# Patient Record
Sex: FEMALE | Race: WHITE | Employment: OTHER | URBAN - METROPOLITAN AREA
[De-identification: names, ages, dates, MRNs, and addresses within clinical notes are randomized per-mention and may not be internally consistent; named-entity substitution may affect disease eponyms.]

---

## 2017-09-27 ENCOUNTER — HOSPITAL ENCOUNTER (OUTPATIENT)
Dept: WOMENS IMAGING | Age: 77
Discharge: HOME | End: 2017-09-27
Attending: FAMILY MEDICINE | Admitting: FAMILY MEDICINE

## 2017-09-27 DIAGNOSIS — Z12.31 VISIT FOR SCREENING MAMMOGRAM: ICD-10-CM

## 2019-03-26 NOTE — PROGRESS NOTES
Phone message left to call PAT dept at [phone]  for history review and surgery instructions. Electronically signed by Sukhjinder Newberry RN on 3/26/19 at 1:51 PM

## 2019-03-27 RX ORDER — ERYTHROMYCIN 5 MG/G
OINTMENT OPHTHALMIC PRN
COMMUNITY
Start: 2017-09-21

## 2019-03-27 RX ORDER — GLYCOPYRROLATE 2 MG/1
2 TABLET ORAL 2 TIMES DAILY
COMMUNITY

## 2019-03-27 RX ORDER — ROPINIROLE 0.5 MG/1
TABLET, FILM COATED ORAL
COMMUNITY
Start: 2017-01-10

## 2019-03-27 RX ORDER — TRAMADOL HYDROCHLORIDE 50 MG/1
50 TABLET ORAL EVERY 6 HOURS PRN
COMMUNITY

## 2019-03-27 RX ORDER — VIT C/B6/B5/MAGNESIUM/HERB 173 50-5-6-5MG
1 CAPSULE ORAL DAILY
COMMUNITY

## 2019-03-27 RX ORDER — HYDROCODONE BITARTRATE AND ACETAMINOPHEN 5; 325 MG/1; MG/1
1 TABLET ORAL NIGHTLY PRN
COMMUNITY

## 2019-03-27 RX ORDER — LISINOPRIL 5 MG/1
5 TABLET ORAL DAILY
COMMUNITY

## 2019-03-27 RX ORDER — ATENOLOL 50 MG/1
TABLET ORAL
COMMUNITY
Start: 2017-08-10

## 2019-03-27 RX ORDER — AMLODIPINE BESYLATE 10 MG/1
TABLET ORAL
COMMUNITY
Start: 2009-09-09

## 2019-03-27 RX ORDER — LOVASTATIN 40 MG/1
TABLET ORAL
COMMUNITY
Start: 2017-01-10

## 2019-03-27 RX ORDER — RANITIDINE 300 MG/1
TABLET ORAL
COMMUNITY
Start: 2016-11-03

## 2019-03-27 RX ORDER — WARFARIN SODIUM 5 MG/1
TABLET ORAL
COMMUNITY
Start: 2017-07-25

## 2019-03-27 RX ORDER — POTASSIUM CHLORIDE 1500 MG/1
20 TABLET, FILM COATED, EXTENDED RELEASE ORAL
COMMUNITY

## 2019-03-27 RX ORDER — PANTOPRAZOLE SODIUM 40 MG/1
TABLET, DELAYED RELEASE ORAL
COMMUNITY
Start: 2017-08-15

## 2019-03-27 RX ORDER — NITROGLYCERIN 0.4 MG/1
0.4 TABLET SUBLINGUAL EVERY 5 MIN PRN
COMMUNITY
Start: 2008-11-12

## 2019-03-27 RX ORDER — VITAMIN E 268 MG
400 CAPSULE ORAL DAILY
COMMUNITY

## 2019-03-27 SDOH — HEALTH STABILITY: MENTAL HEALTH: HOW OFTEN DO YOU HAVE A DRINK CONTAINING ALCOHOL?: NEVER

## 2019-03-27 NOTE — PROGRESS NOTES
[address] [facility] time___0600_________        Surgery time___0730_________    Take the following medications with a sip of water: AMLODIPINE,ATENOLOL,LISINOPRIL,RANITIDINE,TRAMADOL IF NEEDED  Do not eat or drink anything after 12:00 midnight prior to your surgery. This includes water chewing gum, mints and ice chips. You may brush your teeth and gargle the morning of your surgery, but do not swallow the water     Please see your family doctor/pediatrician for a history and physical and/or concerning medications. Bring any test results/reports from your physicians office. If you are under the care of a heart doctor or specialist doctor, please be aware that you may be asked to them for clearance    You may be asked to stop blood thinners such as Coumadin, Plavix, Fragmin, Lovenox, etc., or any anti-inflammatories such as:  Aspirin, Ibuprofen, Advil, Naproxen prior to your surgery. We also ask that you stop any OTC medications such as fish oil, vitamin E, glucosamine, garlic, Multivitamins, COQ 10, etc.STOP TUMERIC,MILK THISTLE-FOLLOW INSTRUCTIONS ON COUMADIN AND ASPIRIN FROM DR. POWERS    We ask that you do not smoke 24 hours prior to surgery  We ask that you do not  drink any alcoholic beverages 24 hours prior to surgery     You must make arrangements for a responsible adult to take you home after your surgery. For your safety you will not be allowed to leave alone or drive yourself home. Your surgery will be cancelled if you do not have a ride home. Also for your safety, it is strongly suggested that someone stay with you the first 24 hours after your surgery. A parent or legal guardian must accompany a child scheduled for surgery and plan to stay at the hospital until the child is discharged. Please do not bring other children with you. For your comfort, please wear simple loose fitting clothing to the hospital.  Please do not bring valuables. Do not wear any make-up or nail polish on your fingers or toes      For your safety, please do not wear any jewelry or body piercing's on the day of surgery. All jewelry must be removed. If you have dentures, they will be removed before going to operating room. For your convenience, we will provide you with a container. If you wear contact lenses or glasses, they will be removed, please bring a case for them. If you have a living will and a durable power of  for healthcare, please bring in a copy. As part of our patient safety program to minimize surgical site infections, we ask you to do the following:    · Please notify your surgeon if you develop any illness between         now and the  day of your surgery. · This includes a cough, cold, fever, sore throat, nausea,         or vomiting, and diarrhea, etc.  ·  Please notify your surgeon if you experience dizziness, shortness         of breath or blurred vision between now and the time of your surgery. Do not shave your operative site 96 hours prior to surgery. For face and neck surgery, men may use an electric razor 48 hours   prior to surgery. You may shower the night before surgery or the morning of   your surgery with an antibacterial soap. You will need to bring a photo ID and insurance card    [facility] has an onsite pharmacy, would you like to utilize our pharmacy     If you will be staying overnight and use a C-pap machine, please bring   your C-pap to hospital     Our goal is to provide you with excellent care, therefore, visitors will be limited to two(2) in the room at a time so that we may focus on providing this care for you. Please contact pre-admission testing if you have any further questions.                  [facility] phone number:  [phone] PAT fax number:  [phone]  Please note these are generalized instructions for all surgical cases, you may be provided with more specific instructions according to your surgery.

## 2019-03-27 NOTE — PROGRESS NOTES
C-Difficile admission screening and protocol:     * Admitted with diarrhea? NO     *Prior history of C-Diff. In last 3 months? NO     *Antibiotic use in the past 6-8 weeks? NO     *Prior hospitalization or nursing home in the last month?    NO

## 2019-03-28 NOTE — PROGRESS NOTES
DR. Easton Flores OFFICE NOTIFIED FOR PATIENT REGARDING HER INSTRUCTIONS FOR HER COUMADIN AND ASPIRIN PRIOR TO SURGERY

## 2019-03-29 ENCOUNTER — ANESTHESIA EVENT (OUTPATIENT)
Dept: OPERATING ROOM | Age: 79
End: 2019-03-29
Payer: MEDICARE

## 2019-04-02 ENCOUNTER — HOSPITAL ENCOUNTER (OUTPATIENT)
Age: 79
Setting detail: OUTPATIENT SURGERY
Discharge: HOME | End: 2019-04-02
Attending: OBSTETRICS & GYNECOLOGY | Admitting: OBSTETRICS & GYNECOLOGY
Payer: MEDICARE

## 2019-04-02 ENCOUNTER — APPOINTMENT (OUTPATIENT)
Dept: GENERAL RADIOLOGY | Age: 79
End: 2019-04-02
Attending: OBSTETRICS & GYNECOLOGY
Payer: MEDICARE

## 2019-04-02 ENCOUNTER — ANESTHESIA (OUTPATIENT)
Dept: OPERATING ROOM | Age: 79
End: 2019-04-02
Payer: MEDICARE

## 2019-04-02 VITALS
RESPIRATION RATE: 14 BRPM | OXYGEN SATURATION: 96 % | WEIGHT: 189.6 LBS | BODY MASS INDEX: 29.76 KG/M2 | SYSTOLIC BLOOD PRESSURE: 151 MMHG | DIASTOLIC BLOOD PRESSURE: 82 MMHG | HEIGHT: 67 IN | TEMPERATURE: 97 F | HEART RATE: 85 BPM

## 2019-04-02 VITALS
RESPIRATION RATE: 4 BRPM | SYSTOLIC BLOOD PRESSURE: 117 MMHG | DIASTOLIC BLOOD PRESSURE: 62 MMHG | OXYGEN SATURATION: 99 %

## 2019-04-02 DIAGNOSIS — N95.0 POSTMENOPAUSAL BLEEDING: ICD-10-CM

## 2019-04-02 LAB
ABO/RH: NORMAL
ANION GAP SERPL CALCULATED.3IONS-SCNC: 15 MMOL/L (ref 3–16)
ANTIBODY SCREEN: NORMAL
BUN BLDV-MCNC: 10 MG/DL (ref 7–20)
CALCIUM SERPL-MCNC: 9.3 MG/DL (ref 8.3–10.6)
CHLORIDE BLD-SCNC: 102 MMOL/L (ref 99–110)
CO2: 23 MMOL/L (ref 21–32)
CREAT SERPL-MCNC: <0.5 MG/DL (ref 0.6–1.2)
EKG ATRIAL RATE: 67 BPM
EKG DIAGNOSIS: NORMAL
EKG P AXIS: 31 DEGREES
EKG P-R INTERVAL: 160 MS
EKG Q-T INTERVAL: 420 MS
EKG QRS DURATION: 86 MS
EKG QTC CALCULATION (BAZETT): 443 MS
EKG R AXIS: -12 DEGREES
EKG T AXIS: 43 DEGREES
EKG VENTRICULAR RATE: 67 BPM
GFR AFRICAN AMERICAN: >60
GFR NON-AFRICAN AMERICAN: >60
GLUCOSE BLD-MCNC: 97 MG/DL (ref 70–99)
HCT VFR BLD CALC: 42 % (ref 36–48)
HCT VFR BLD CALC: 42.9 % (ref 36–48)
HEMOGLOBIN: 14 G/DL (ref 12–16)
HEMOGLOBIN: 14.4 G/DL (ref 12–16)
INR BLD: 3.74 (ref 0.86–1.14)
MCH RBC QN AUTO: 30.6 PG (ref 26–34)
MCH RBC QN AUTO: 30.7 PG (ref 26–34)
MCHC RBC AUTO-ENTMCNC: 33.3 G/DL (ref 31–36)
MCHC RBC AUTO-ENTMCNC: 33.5 G/DL (ref 31–36)
MCV RBC AUTO: 91.8 FL (ref 80–100)
MCV RBC AUTO: 91.8 FL (ref 80–100)
PDW BLD-RTO: 13.9 % (ref 12.4–15.4)
PDW BLD-RTO: 14.4 % (ref 12.4–15.4)
PLATELET # BLD: 186 K/UL (ref 135–450)
PLATELET # BLD: 201 K/UL (ref 135–450)
PMV BLD AUTO: 8.2 FL (ref 5–10.5)
PMV BLD AUTO: 8.3 FL (ref 5–10.5)
POTASSIUM REFLEX MAGNESIUM: 3.8 MMOL/L (ref 3.5–5.1)
PROTHROMBIN TIME: 42.6 SEC (ref 9.8–13)
RBC # BLD: 4.58 M/UL (ref 4–5.2)
RBC # BLD: 4.68 M/UL (ref 4–5.2)
SODIUM BLD-SCNC: 140 MMOL/L (ref 136–145)
WBC # BLD: 11.8 K/UL (ref 4–11)
WBC # BLD: 4.6 K/UL (ref 4–11)

## 2019-04-02 PROCEDURE — 7100000011 HC PHASE II RECOVERY - ADDTL 15 MIN: Performed by: OBSTETRICS & GYNECOLOGY

## 2019-04-02 PROCEDURE — 3700000000 HC ANESTHESIA ATTENDED CARE: Performed by: OBSTETRICS & GYNECOLOGY

## 2019-04-02 PROCEDURE — 3600000004 HC SURGERY LEVEL 4 BASE: Performed by: OBSTETRICS & GYNECOLOGY

## 2019-04-02 PROCEDURE — 86900 BLOOD TYPING SEROLOGIC ABO: CPT

## 2019-04-02 PROCEDURE — 6360000002 HC RX W HCPCS: Performed by: NURSE ANESTHETIST, CERTIFIED REGISTERED

## 2019-04-02 PROCEDURE — 36415 COLL VENOUS BLD VENIPUNCTURE: CPT

## 2019-04-02 PROCEDURE — 2709999900 HC NON-CHARGEABLE SUPPLY: Performed by: OBSTETRICS & GYNECOLOGY

## 2019-04-02 PROCEDURE — 85027 COMPLETE CBC AUTOMATED: CPT

## 2019-04-02 PROCEDURE — 3700000001 HC ADD 15 MINUTES (ANESTHESIA): Performed by: OBSTETRICS & GYNECOLOGY

## 2019-04-02 PROCEDURE — 6360000002 HC RX W HCPCS: Performed by: ANESTHESIOLOGY

## 2019-04-02 PROCEDURE — 7100000010 HC PHASE II RECOVERY - FIRST 15 MIN: Performed by: OBSTETRICS & GYNECOLOGY

## 2019-04-02 PROCEDURE — 80048 BASIC METABOLIC PNL TOTAL CA: CPT

## 2019-04-02 PROCEDURE — 85610 PROTHROMBIN TIME: CPT

## 2019-04-02 PROCEDURE — 86850 RBC ANTIBODY SCREEN: CPT

## 2019-04-02 PROCEDURE — 2580000003 HC RX 258: Performed by: ANESTHESIOLOGY

## 2019-04-02 PROCEDURE — 85025 COMPLETE CBC W/AUTO DIFF WBC: CPT

## 2019-04-02 PROCEDURE — 2720000010 HC SURG SUPPLY STERILE: Performed by: OBSTETRICS & GYNECOLOGY

## 2019-04-02 PROCEDURE — 6370000000 HC RX 637 (ALT 250 FOR IP): Performed by: ANESTHESIOLOGY

## 2019-04-02 PROCEDURE — 7100000001 HC PACU RECOVERY - ADDTL 15 MIN: Performed by: OBSTETRICS & GYNECOLOGY

## 2019-04-02 PROCEDURE — 71045 X-RAY EXAM CHEST 1 VIEW: CPT

## 2019-04-02 PROCEDURE — 2580000003 HC RX 258: Performed by: OBSTETRICS & GYNECOLOGY

## 2019-04-02 PROCEDURE — 86901 BLOOD TYPING SEROLOGIC RH(D): CPT

## 2019-04-02 PROCEDURE — 7100000000 HC PACU RECOVERY - FIRST 15 MIN: Performed by: OBSTETRICS & GYNECOLOGY

## 2019-04-02 PROCEDURE — 3600000014 HC SURGERY LEVEL 4 ADDTL 15MIN: Performed by: OBSTETRICS & GYNECOLOGY

## 2019-04-02 PROCEDURE — 2500000003 HC RX 250 WO HCPCS: Performed by: NURSE ANESTHETIST, CERTIFIED REGISTERED

## 2019-04-02 PROCEDURE — 93005 ELECTROCARDIOGRAM TRACING: CPT | Performed by: ANESTHESIOLOGY

## 2019-04-02 PROCEDURE — 88305 TISSUE EXAM BY PATHOLOGIST: CPT

## 2019-04-02 PROCEDURE — 93010 ELECTROCARDIOGRAM REPORT: CPT | Performed by: INTERNAL MEDICINE

## 2019-04-02 RX ORDER — ONDANSETRON 2 MG/ML
4 INJECTION INTRAMUSCULAR; INTRAVENOUS
Status: DISCONTINUED | OUTPATIENT
Start: 2019-04-02 | End: 2019-04-02 | Stop reason: HOSPADM

## 2019-04-02 RX ORDER — MAGNESIUM HYDROXIDE 1200 MG/15ML
LIQUID ORAL CONTINUOUS PRN
Status: COMPLETED | OUTPATIENT
Start: 2019-04-02 | End: 2019-04-02

## 2019-04-02 RX ORDER — SODIUM CHLORIDE 0.9 % (FLUSH) 0.9 %
10 SYRINGE (ML) INJECTION PRN
Status: DISCONTINUED | OUTPATIENT
Start: 2019-04-02 | End: 2019-04-02 | Stop reason: HOSPADM

## 2019-04-02 RX ORDER — SODIUM CHLORIDE 0.9 % (FLUSH) 0.9 %
10 SYRINGE (ML) INJECTION EVERY 12 HOURS SCHEDULED
Status: DISCONTINUED | OUTPATIENT
Start: 2019-04-02 | End: 2019-04-02 | Stop reason: HOSPADM

## 2019-04-02 RX ORDER — ONDANSETRON 2 MG/ML
INJECTION INTRAMUSCULAR; INTRAVENOUS PRN
Status: DISCONTINUED | OUTPATIENT
Start: 2019-04-02 | End: 2019-04-02 | Stop reason: SDUPTHER

## 2019-04-02 RX ORDER — GLYCOPYRROLATE 0.2 MG/ML
INJECTION INTRAMUSCULAR; INTRAVENOUS PRN
Status: DISCONTINUED | OUTPATIENT
Start: 2019-04-02 | End: 2019-04-02 | Stop reason: SDUPTHER

## 2019-04-02 RX ORDER — PROPOFOL 10 MG/ML
INJECTION, EMULSION INTRAVENOUS PRN
Status: DISCONTINUED | OUTPATIENT
Start: 2019-04-02 | End: 2019-04-02 | Stop reason: SDUPTHER

## 2019-04-02 RX ORDER — FENTANYL CITRATE 50 UG/ML
25 INJECTION, SOLUTION INTRAMUSCULAR; INTRAVENOUS EVERY 5 MIN PRN
Status: DISCONTINUED | OUTPATIENT
Start: 2019-04-02 | End: 2019-04-02 | Stop reason: HOSPADM

## 2019-04-02 RX ORDER — SUCCINYLCHOLINE CHLORIDE 20 MG/ML
INJECTION INTRAMUSCULAR; INTRAVENOUS PRN
Status: DISCONTINUED | OUTPATIENT
Start: 2019-04-02 | End: 2019-04-02 | Stop reason: SDUPTHER

## 2019-04-02 RX ORDER — SODIUM CHLORIDE 9 MG/ML
INJECTION, SOLUTION INTRAVENOUS CONTINUOUS
Status: DISCONTINUED | OUTPATIENT
Start: 2019-04-02 | End: 2019-04-02 | Stop reason: HOSPADM

## 2019-04-02 RX ORDER — DEXAMETHASONE SODIUM PHOSPHATE 4 MG/ML
INJECTION, SOLUTION INTRA-ARTICULAR; INTRALESIONAL; INTRAMUSCULAR; INTRAVENOUS; SOFT TISSUE PRN
Status: DISCONTINUED | OUTPATIENT
Start: 2019-04-02 | End: 2019-04-02 | Stop reason: SDUPTHER

## 2019-04-02 RX ORDER — FENTANYL CITRATE 50 UG/ML
INJECTION, SOLUTION INTRAMUSCULAR; INTRAVENOUS PRN
Status: DISCONTINUED | OUTPATIENT
Start: 2019-04-02 | End: 2019-04-02 | Stop reason: SDUPTHER

## 2019-04-02 RX ORDER — ACETAMINOPHEN 325 MG/1
650 TABLET ORAL EVERY 4 HOURS PRN
Status: DISCONTINUED | OUTPATIENT
Start: 2019-04-02 | End: 2019-04-02 | Stop reason: HOSPADM

## 2019-04-02 RX ORDER — LIDOCAINE HYDROCHLORIDE 20 MG/ML
INJECTION, SOLUTION EPIDURAL; INFILTRATION; INTRACAUDAL; PERINEURAL PRN
Status: DISCONTINUED | OUTPATIENT
Start: 2019-04-02 | End: 2019-04-02 | Stop reason: SDUPTHER

## 2019-04-02 RX ORDER — FENTANYL CITRATE 50 UG/ML
50 INJECTION, SOLUTION INTRAMUSCULAR; INTRAVENOUS EVERY 5 MIN PRN
Status: DISCONTINUED | OUTPATIENT
Start: 2019-04-02 | End: 2019-04-02 | Stop reason: HOSPADM

## 2019-04-02 RX ADMIN — FENTANYL CITRATE 25 MCG: 50 INJECTION, SOLUTION INTRAMUSCULAR; INTRAVENOUS at 08:43

## 2019-04-02 RX ADMIN — SODIUM CHLORIDE: 9 INJECTION, SOLUTION INTRAVENOUS at 07:19

## 2019-04-02 RX ADMIN — FENTANYL CITRATE 100 MCG: 50 INJECTION INTRAMUSCULAR; INTRAVENOUS at 07:31

## 2019-04-02 RX ADMIN — DEXAMETHASONE SODIUM PHOSPHATE 8 MG: 4 INJECTION, SOLUTION INTRAMUSCULAR; INTRAVENOUS at 07:54

## 2019-04-02 RX ADMIN — ONDANSETRON 4 MG: 2 INJECTION INTRAMUSCULAR; INTRAVENOUS at 08:03

## 2019-04-02 RX ADMIN — ACETAMINOPHEN 650 MG: 325 TABLET, FILM COATED ORAL at 11:56

## 2019-04-02 RX ADMIN — PROPOFOL 150 MG: 10 INJECTION, EMULSION INTRAVENOUS at 07:36

## 2019-04-02 RX ADMIN — GLYCOPYRROLATE 0.2 MG: 0.2 INJECTION, SOLUTION INTRAMUSCULAR; INTRAVENOUS at 07:55

## 2019-04-02 RX ADMIN — LIDOCAINE HYDROCHLORIDE 40 MG: 20 INJECTION, SOLUTION EPIDURAL; INFILTRATION; INTRACAUDAL; PERINEURAL at 07:36

## 2019-04-02 RX ADMIN — PROPOFOL 50 MG: 10 INJECTION, EMULSION INTRAVENOUS at 07:53

## 2019-04-02 RX ADMIN — SUCCINYLCHOLINE CHLORIDE 100 MG: 20 INJECTION, SOLUTION INTRAMUSCULAR; INTRAVENOUS at 07:53

## 2019-04-02 RX ADMIN — SODIUM CHLORIDE: 9 INJECTION, SOLUTION INTRAVENOUS at 07:31

## 2019-04-02 RX ADMIN — FENTANYL CITRATE 50 MCG: 50 INJECTION, SOLUTION INTRAMUSCULAR; INTRAVENOUS at 08:34

## 2019-04-02 ASSESSMENT — PAIN DESCRIPTION - PAIN TYPE
TYPE: OTHER (COMMENT)
TYPE: ACUTE PAIN
TYPE: SURGICAL PAIN
TYPE: CHRONIC PAIN
TYPE: CHRONIC PAIN
TYPE: SURGICAL PAIN
TYPE_2: ACUTE PAIN
TYPE: SURGICAL PAIN
TYPE: CHRONIC PAIN
TYPE: SURGICAL PAIN

## 2019-04-02 ASSESSMENT — PAIN SCALES - GENERAL
PAINLEVEL_OUTOF10: 0
PAINLEVEL_OUTOF10: 2
PAINLEVEL_OUTOF10: 4
PAINLEVEL_OUTOF10: 0
PAINLEVEL_OUTOF10: 8
PAINLEVEL_OUTOF10: 0
PAINLEVEL_OUTOF10: 2
PAINLEVEL_OUTOF10: 2
PAINLEVEL_OUTOF10: 0
PAINLEVEL_OUTOF10: 2
PAINLEVEL_OUTOF10: 4

## 2019-04-02 ASSESSMENT — PAIN DESCRIPTION - LOCATION
LOCATION: LEG
LOCATION: LEG
LOCATION: HEAD
LOCATION_2: HEAD
LOCATION: ABDOMEN
LOCATION: LEG
LOCATION: HEAD

## 2019-04-02 ASSESSMENT — PULMONARY FUNCTION TESTS
PIF_VALUE: 1
PIF_VALUE: 8
PIF_VALUE: 0
PIF_VALUE: 5
PIF_VALUE: 1
PIF_VALUE: 3
PIF_VALUE: 1
PIF_VALUE: 35
PIF_VALUE: 3
PIF_VALUE: 19
PIF_VALUE: 3
PIF_VALUE: 4
PIF_VALUE: 5
PIF_VALUE: 3
PIF_VALUE: 1
PIF_VALUE: 5
PIF_VALUE: 33
PIF_VALUE: 6
PIF_VALUE: 34
PIF_VALUE: 6
PIF_VALUE: 7
PIF_VALUE: 5
PIF_VALUE: 23
PIF_VALUE: 2
PIF_VALUE: 0
PIF_VALUE: 9
PIF_VALUE: 31
PIF_VALUE: 5
PIF_VALUE: 33
PIF_VALUE: 4
PIF_VALUE: 1
PIF_VALUE: 9
PIF_VALUE: 9
PIF_VALUE: 34
PIF_VALUE: 1
PIF_VALUE: 9
PIF_VALUE: 35
PIF_VALUE: 33
PIF_VALUE: 0
PIF_VALUE: 8
PIF_VALUE: 1
PIF_VALUE: 34
PIF_VALUE: 0
PIF_VALUE: 0
PIF_VALUE: 3
PIF_VALUE: 3
PIF_VALUE: 4
PIF_VALUE: 19

## 2019-04-02 ASSESSMENT — PAIN - FUNCTIONAL ASSESSMENT
PAIN_FUNCTIONAL_ASSESSMENT: ACTIVITIES ARE NOT PREVENTED
PAIN_FUNCTIONAL_ASSESSMENT: 0-10
PAIN_FUNCTIONAL_ASSESSMENT: ACTIVITIES ARE NOT PREVENTED

## 2019-04-02 ASSESSMENT — PAIN DESCRIPTION - PROGRESSION
CLINICAL_PROGRESSION: NOT CHANGED
CLINICAL_PROGRESSION: GRADUALLY WORSENING
CLINICAL_PROGRESSION: NOT CHANGED
CLINICAL_PROGRESSION: GRADUALLY WORSENING
CLINICAL_PROGRESSION: NOT CHANGED
CLINICAL_PROGRESSION: NOT CHANGED
CLINICAL_PROGRESSION_2: RESOLVED
CLINICAL_PROGRESSION: NOT CHANGED
CLINICAL_PROGRESSION: NOT CHANGED
CLINICAL_PROGRESSION: GRADUALLY IMPROVING

## 2019-04-02 ASSESSMENT — PAIN DESCRIPTION - ONSET
ONSET: ON-GOING
ONSET: GRADUAL
ONSET: ON-GOING
ONSET: GRADUAL
ONSET: ON-GOING
ONSET: GRADUAL
ONSET: GRADUAL
ONSET: ON-GOING

## 2019-04-02 ASSESSMENT — PAIN DESCRIPTION - ORIENTATION
ORIENTATION: LOWER
ORIENTATION: RIGHT;LEFT
ORIENTATION: MID
ORIENTATION: LOWER
ORIENTATION: LOWER;MID
ORIENTATION: LOWER
ORIENTATION: RIGHT;LEFT
ORIENTATION: MID
ORIENTATION: RIGHT;LEFT
ORIENTATION: LOWER

## 2019-04-02 ASSESSMENT — PAIN DESCRIPTION - DESCRIPTORS
DESCRIPTORS: ACHING
DESCRIPTORS: CRAMPING
DESCRIPTORS: ACHING
DESCRIPTORS_2: OTHER (COMMENT)
DESCRIPTORS: ACHING
DESCRIPTORS: ACHING

## 2019-04-02 ASSESSMENT — PAIN DESCRIPTION - FREQUENCY
FREQUENCY: CONTINUOUS
FREQUENCY: INTERMITTENT
FREQUENCY: CONTINUOUS
FREQUENCY: CONTINUOUS
FREQUENCY: INTERMITTENT
FREQUENCY: CONTINUOUS
FREQUENCY: INTERMITTENT

## 2019-04-02 ASSESSMENT — LIFESTYLE VARIABLES: SMOKING_STATUS: 0

## 2019-04-02 ASSESSMENT — PAIN DESCRIPTION - INTENSITY: RATING_2: 0

## 2019-04-02 NOTE — PROGRESS NOTES
Awaiting CBC results, lab contacted. C/o abd cramping pain after eating, monitoring as patient received med for HA which has resolved.

## 2019-04-02 NOTE — PROGRESS NOTES
From PACU. Alert and oriented. C/o 2/10 surgical abd aching that is tolerable. abd soft. No drainage on yudy pad at this time.

## 2019-04-02 NOTE — PROGRESS NOTES
Pt states pain is now 4/10. Pt medicated per order. (See eMAR).  Electronically signed by Brett Schmidt RN on 4/2/2019 at 8:44 AM

## 2019-04-02 NOTE — PROGRESS NOTES
Spoke with Dr Fatou Norwood, stated that pt needed a CBC before leaving and to call with results before pt was allowed to be discharged. Stated that ok for pt to go to phase II before labs resulted, but that she is to be called with CBC results before pt is discharged.  Electronically signed by Elaina Bee RN on 4/2/2019 at 9:11 AM

## 2019-04-02 NOTE — PROGRESS NOTES
Nafisa Jose NP from Dr Carloz Granados office called and said that Dr Oswald Martinez would prefer if Dr Nicholas Hamilton would keep the patient over night for observation and the patient should not take her warfarin until she is evaluated by the coumadin clinic on 4/4/19.

## 2019-04-02 NOTE — ANESTHESIA POSTPROCEDURE EVALUATION
Department of Anesthesiology  Postprocedure Note    Patient: Jaswant Rosas  MRN: [id number]  YOB: 1940  Date of evaluation: 4/2/2019  Time:  10:24 AM     Procedure Summary     Date:  04/02/19 Room / Location:  [facility] OR  / [facility] OR    Anesthesia Start:  0732 Anesthesia Stop:  0603    Procedure:  HYSTEROSCOPY DILATION AND CURETTAGE POLYP REMOVAL (N/A ) Diagnosis:       Postmenopausal bleeding      (POST MENOPAUSAL BLEEDING)    Surgeon:  Gio Mancilla MD Responsible Provider:  Anai Frank MD    Anesthesia Type:  general ASA Status:  3          Anesthesia Type: general    Saad Phase I: Saad Score: 10    Saad Phase II:      Last vitals: Reviewed and per EMR flowsheets. Anesthesia Post Evaluation    Patient location during evaluation: PACU  Patient participation: complete - patient participated  Level of consciousness: awake and alert  Pain score: 2  Airway patency: patent  Nausea & Vomiting: no nausea and no vomiting  Complications: no  Cardiovascular status: blood pressure returned to baseline  Respiratory status: acceptable (Patient thought to have possibly aspirated on induction.   Postoperatively, patient expreienced no resp distress, Sp)2 in 90s on RA, CXR clear)  Hydration status: euvolemic

## 2019-04-02 NOTE — PROGRESS NOTES
Alert and oriented. 2/10 tolerable abd surgical pain that is unchanging. abd soft. No drainage on yudy pad. Dr Gama Galvan notified of H/H of 10.3/31.3 and patient's condition. 1300 repeat cbc ordered and also ordered to contact cardiologist about warfarin order.

## 2019-04-02 NOTE — PROGRESS NOTES
Pt states pain is 8/10. Pt medicated per order (see eMAR).  Electronically signed by Rhonda Cristina RN on 4/2/2019 at 8:43 AM

## 2019-04-02 NOTE — PROGRESS NOTES
Alert and oriented. Vss. abd soft. Scant red drainage on yudy pad. Tolerated sitting up and po fluids and cookies well. Family at bedside. abd discomfort remains the same at 2/10. Jensen Gómez NP (Dr. Phan/cardiologist office) said she would report to Dr. Bebeto Sheldon patient's condition and would call back about warfarin order. Charge nurse, Jamee Dance RN notified of patient's condition and activities associated with them.

## 2019-04-02 NOTE — PROGRESS NOTES
Pt now switched to 4L O2 via nasal cannula. O2 sat at 98%. Will monitor.  Electronically signed by Hali Higuera RN on 4/2/2019 at 8:46 AM

## 2019-04-02 NOTE — PROGRESS NOTES
Pt now on RA, 95%-96%. Will monitor.  Electronically signed by Mery Mckeon RN on 4/2/2019 at 8:56 AM

## 2019-04-02 NOTE — ANESTHESIA PRE PROCEDURE
Department of Anesthesiology  Preprocedure Note       Name:  Ida Purcell   Age:  69 y.o.  :  1940                                          MRN:  [id number]         Date:  2019      Surgeon: Bandar Renee):  Rm Mcfadden MD    Procedure: HYSTEROSCOPY DILATION AND CURETTAGE POLYP REMOVAL (N/A )    Medications prior to admission:   Prior to Admission medications    Medication Sig Start Date End Date Taking?  Authorizing Provider   Chlorpheniramine-DM (CORICIDIN HBP COUGH/COLD PO) Take 1 tablet by mouth as needed   Yes Historical Provider, MD   VITAMIN B COMPLEX-C PO Take 1 tablet by mouth daily    Yes Historical Provider, MD   Glucosamine-Chondroitin 250-200 MG TABS Take 1 capsule by mouth daily    Yes Historical Provider, MD   amLODIPine (NORVASC) 10 MG tablet TAKE 1 TABLET EVERY DAY (NEED MD APPOINTMENT FOR FURTHER REFILLS) 09  Yes Historical Provider, MD   atenolol (TENORMIN) 50 MG tablet TAKE 1 TABLET EVERY DAY (NEED MD APPOINTMENT FOR FURTHER REFILLS) 8/10/17  Yes Historical Provider, MD   erythromycin (ROMYCIN) 5 MG/GM ophthalmic ointment Place into both eyes as needed  17  Yes Historical Provider, MD   lovastatin (MEVACOR) 40 MG tablet TAKE 1 TABLET EVERY DAY WITH DINNER 1/10/17  Yes Historical Provider, MD   nitroGLYCERIN (NITROSTAT) 0.4 MG SL tablet Place 0.4 mg under the tongue every 5 minutes as needed  08  Yes Historical Provider, MD   pantoprazole (PROTONIX) 40 MG tablet TAKE 1 TABLET BY MOUTH DAILY IN EVENING 8/15/17  Yes Historical Provider, MD   ranitidine (ZANTAC) 300 MG tablet TAKE 1 TABLET TWICE DAILY 11/3/16  Yes Historical Provider, MD   rOPINIRole (REQUIP) 0.5 MG tablet TAKE 1 TABLET 6 TIMES DAILY 1/10/17  Yes Historical Provider, MD   Turmeric 500 MG CAPS Take 1 tablet by mouth daily    Yes Historical Provider, MD   warfarin (COUMADIN) 5 MG tablet TAKE 1 TABLET DAILY AS INSTRUCTED PENDING INR RESULTS 17  Yes Historical Provider, MD   glycopyrrolate Laterality Date    CHOLECYSTECTOMY      COLONOSCOPY      CORONARY ANGIOPLASTY WITH STENT PLACEMENT  2014    X4    LIPOMA RESECTION      LIPOMA REMOVAL SHOULDER       Social History:    Social History     Tobacco Use    Smoking status: Never Smoker    Smokeless tobacco: Never Used   Substance Use Topics    Alcohol use: Yes     Frequency: Never     Comment: OCCAS                                Counseling given: Not Answered      Vital Signs (Current):   Vitals:    03/27/19 0927 04/02/19 0620   BP:  (!) 153/78   Pulse:  69   Resp:  16   Temp:  97 °F (36.1 °C)   TempSrc:  Temporal   SpO2:  94%   Weight: 183 lb (83 kg) 189 lb 9.5 oz (86 kg)   Height: 5' 7\" (1.702 m) 5' 7\" (1.702 m)                                              BP Readings from Last 3 Encounters:   04/02/19 (!) 153/78       NPO Status: Time of last liquid consumption: 2200                        Time of last solid consumption: 2000                        Date of last liquid consumption: 04/01/19                        Date of last solid food consumption: 04/01/19    BMI:   Wt Readings from Last 3 Encounters:   04/02/19 189 lb 9.5 oz (86 kg)     Body mass index is 29.69 kg/m². CBC:   Lab Results   Component Value Date    WBC 4.4 10/06/2011    RBC 4.21 10/06/2011    HGB 13.2 10/06/2011    HCT 39.4 10/06/2011    MCV 93.6 10/06/2011    RDW 13.1 10/06/2011     10/06/2011       CMP:   Lab Results   Component Value Date     10/06/2011    K 4.4 10/06/2011     10/06/2011    CO2 32 10/06/2011    BUN 10 10/06/2011    CREATININE 0.6 10/06/2011    GFRAA >60 10/06/2011    GLUCOSE 96 10/06/2011    PROT 6.5 10/06/2011    CALCIUM 9.6 10/06/2011    BILITOT 0.60 10/06/2011    ALKPHOS 65 10/06/2011    AST 54 10/06/2011    ALT 57 10/06/2011       POC Tests: No results for input(s): POCGLU, POCNA, POCK, POCCL, POCBUN, POCHEMO, POCHCT in the last 72 hours.     Coags:   Lab Results   Component Value Date    PROTIME 12.0 08/05/2011    INR 1.10 08/05/2011       HCG (If Applicable): No results found for: PREGTESTUR, PREGSERUM, HCG, HCGQUANT     ABGs: No results found for: PHART, PO2ART, DQR3PEV, NUU3LJT, BEART, B6OCYNRY     Type & Screen (If Applicable):  No results found for: LABABO, [address]    Anesthesia Evaluation  Patient summary reviewed no history of anesthetic complications:   Airway: Mallampati: II  TM distance: >3 FB     Mouth opening: > = 3 FB Dental:          Pulmonary: breath sounds clear to auscultation      (-) COPD, asthma and not a current smoker                           Cardiovascular:  Exercise tolerance: good (>4 METS),   (+) hypertension:, CAD: obstructive, CABG/stent:, dysrhythmias: atrial fibrillation, hyperlipidemia    (-) past MI and  angina      Rhythm: regular  Rate: normal                    Neuro/Psych:   (+) TIA,    (-) seizures, CVA, psychiatric history and depression/anxiety            GI/Hepatic/Renal:   (+) GERD:, PUD,      (-) bowel prep and no morbid obesity       Endo/Other:        (-) diabetes mellitus, hypothyroidism               Abdominal:           Vascular:     - DVT and PE. Anesthesia Plan      general     ASA 3       Induction: intravenous. MIPS: Postoperative opioids intended and Prophylactic antiemetics administered. Anesthetic plan and risks discussed with patient. Plan discussed with CRNA. DOS STAFF ADDENDUM:    Pt seen and examined, chart reviewed (including anesthesia, drug and allergy history). No interval changes to history and physical examination. Anesthetic plan, risks, benefits, alternatives, and personnel involved discussed with patient. Patient verbalized an understanding and agrees to proceed.       Steph Sheikh MD  April 2, 2019  6:54 AM            Steph Sheikh MD   4/2/2019

## 2019-04-02 NOTE — PROGRESS NOTES
abd soft. Tylenol given for 2/10 headache. abd discomfort remains the same at 2/10. Ambulate to restroom and voided. Scant red vaginal drainage.

## 2019-04-02 NOTE — PROGRESS NOTES
Vaginal Sweep Documentation     Intraop skin prep sponge count correct, verified by Ambar Neal RN and Marco A Zhou SA    Vaginal sweep performed by Dr. Arianne Barrett at 0721 No foreign objects or vaginal tears noted.

## 2019-04-02 NOTE — OP NOTE
Preop Dx: PMB, thickened endometrium  Postop Dx: same + endometrial polyp  Procedure: D&C/hysteroscopy  Surgeon; Oriana Jeff  Anes: gen  Comp: uterine perforation  EBL: minimal

## 2019-04-03 NOTE — OP NOTE
[address] [place] [address]                                OPERATIVE REPORT    PATIENT NAME: Arlen Michel                 :        1940  MED REC NO:   [id number]                          ROOM:  ACCOUNT NO:   [de-identified]                           ADMIT DATE: 2019  PROVIDER:     Araceli Ardon MD    DATE OF PROCEDURE:  2019    PREOPERATIVE DIAGNOSES:  Postmenopausal bleeding and thickened  endometrium. POSTOPERATIVE DIAGNOSES:  Postmenopausal bleeding and thickened  endometrium plus endometrial polyps. OPERATION PERFORMED:  D&C and hysteroscopy. SURGEON:  Araceli Ardon MD    ANESTHESIA:  General.    COMPLICATIONS:  Uterine perforation. EBL:  Minimal.    INDICATIONS:  This is a 67-year-old white female who presented to the  office with postmenopausal bleeding. She underwent ultrasound and was  found to have an endometrial thickness of 7.01. Endometrial biopsy was  attempted in the office and was unable to be performed due to  discomfort. The risks, benefits, indications, and alternatives of a D&C  and hysteroscopy were discussed with the patient. All questions were  answered. PROCEDURE:  The patient was taken to the operating room with IV running. General anesthesia was obtained. The patient was prepped and draped in  the usual sterile fashion in the dorsal lithotomy position. The cervix  was visualized using Hale retractors and the anterior lip grasped with  an Allis clamp. The cervix was dilated using the Constantin's dilators. The  hysteroscope was then introduced into the uterine cavity and there was  noted to be an irregular polyp originating from the anterior fundus to  the patient's right and then there was noted to be some sort of a septum  in the fundus of the uterus and in one of the holes, there was noted to  be a small polyp within it as well.     The hysteroscope was removed, and the polyp forceps were used to remove  the polyp that was on the right fundus. A curettage was done as well to  obtain sampling of the endometrium. The polyp forceps were then used to  try to remove the polyp in the small cavity and the fundus of the uterus  and while attempting this, a small uterine perforation was made at the  fundus. The hysteroscope was then reintroduced into the cavity and  there was noted to be a small perforation, but no active bleeding. So,  the procedure was ended, and all the instruments were all removed from  the vagina. The patient tolerated the procedure well. Counts were correct, and the  patient was taken to recovery room in stable condition.         Clotilde Bosworth, MD    D: 04/02/2019 10:27:04       T: 04/02/2019 14:58:27     [email or web address]  Job#: [id number]     Doc#: [id number]    CC:  Edel Alatorre MD

## 2020-04-17 ENCOUNTER — APPOINTMENT (OUTPATIENT)
Dept: GENERAL RADIOLOGY | Age: 80
End: 2020-04-17
Payer: MEDICARE

## 2020-04-17 ENCOUNTER — HOSPITAL ENCOUNTER (EMERGENCY)
Age: 80
Discharge: HOME | End: 2020-04-17
Attending: STUDENT IN AN ORGANIZED HEALTH CARE EDUCATION/TRAINING PROGRAM
Payer: MEDICARE

## 2020-04-17 ENCOUNTER — APPOINTMENT (OUTPATIENT)
Dept: CT IMAGING | Age: 80
End: 2020-04-17
Payer: MEDICARE

## 2020-04-17 VITALS
HEIGHT: 67 IN | RESPIRATION RATE: 19 BRPM | DIASTOLIC BLOOD PRESSURE: 67 MMHG | SYSTOLIC BLOOD PRESSURE: 161 MMHG | OXYGEN SATURATION: 94 % | TEMPERATURE: 97.9 F | BODY MASS INDEX: 27.68 KG/M2 | WEIGHT: 176.37 LBS | HEART RATE: 81 BPM

## 2020-04-17 LAB
A/G RATIO: 1.4 (ref 1.1–2.2)
ALBUMIN SERPL-MCNC: 4.1 G/DL (ref 3.4–5)
ALP BLD-CCNC: 62 U/L (ref 40–129)
ALT SERPL-CCNC: 17 U/L (ref 10–40)
ANION GAP SERPL CALCULATED.3IONS-SCNC: 16 MMOL/L (ref 3–16)
AST SERPL-CCNC: 20 U/L (ref 15–37)
BASOPHILS ABSOLUTE: 0 K/UL (ref 0–0.2)
BASOPHILS RELATIVE PERCENT: 0.3 %
BILIRUB SERPL-MCNC: 0.5 MG/DL (ref 0–1)
BILIRUBIN URINE: NEGATIVE
BLOOD, URINE: NEGATIVE
BUN BLDV-MCNC: 9 MG/DL (ref 7–20)
CALCIUM SERPL-MCNC: 9.6 MG/DL (ref 8.3–10.6)
CHLORIDE BLD-SCNC: 99 MMOL/L (ref 99–110)
CLARITY: CLEAR
CO2: 22 MMOL/L (ref 21–32)
COLOR: YELLOW
CREAT SERPL-MCNC: <0.5 MG/DL (ref 0.6–1.2)
EOSINOPHILS ABSOLUTE: 0 K/UL (ref 0–0.6)
EOSINOPHILS RELATIVE PERCENT: 0.7 %
EPITHELIAL CELLS, UA: 1 /HPF (ref 0–5)
GFR AFRICAN AMERICAN: >60
GFR NON-AFRICAN AMERICAN: >60
GLOBULIN: 2.9 G/DL
GLUCOSE BLD-MCNC: 115 MG/DL (ref 70–99)
GLUCOSE URINE: NEGATIVE MG/DL
HCT VFR BLD CALC: 44.6 % (ref 36–48)
HEMOGLOBIN: 14.9 G/DL (ref 12–16)
HYALINE CASTS: 2 /LPF (ref 0–8)
INR BLD: 2.19 (ref 0.86–1.14)
KETONES, URINE: NEGATIVE MG/DL
LEUKOCYTE ESTERASE, URINE: ABNORMAL
LIPASE: 25 U/L (ref 13–60)
LYMPHOCYTES ABSOLUTE: 0.9 K/UL (ref 1–5.1)
LYMPHOCYTES RELATIVE PERCENT: 18.3 %
MCH RBC QN AUTO: 31.4 PG (ref 26–34)
MCHC RBC AUTO-ENTMCNC: 33.5 G/DL (ref 31–36)
MCV RBC AUTO: 93.5 FL (ref 80–100)
MICROSCOPIC EXAMINATION: YES
MONOCYTES ABSOLUTE: 0.1 K/UL (ref 0–1.3)
MONOCYTES RELATIVE PERCENT: 2.7 %
NEUTROPHILS ABSOLUTE: 3.7 K/UL (ref 1.7–7.7)
NEUTROPHILS RELATIVE PERCENT: 78 %
NITRITE, URINE: NEGATIVE
PDW BLD-RTO: 13.3 % (ref 12.4–15.4)
PH UA: 5 (ref 5–8)
PLATELET # BLD: 180 K/UL (ref 135–450)
PMV BLD AUTO: 8.2 FL (ref 5–10.5)
POTASSIUM SERPL-SCNC: 3.6 MMOL/L (ref 3.5–5.1)
PROTEIN UA: NEGATIVE MG/DL
PROTHROMBIN TIME: 25.6 SEC (ref 10–13.2)
RBC # BLD: 4.76 M/UL (ref 4–5.2)
RBC UA: 1 /HPF (ref 0–4)
SODIUM BLD-SCNC: 137 MMOL/L (ref 136–145)
SPECIFIC GRAVITY UA: >1.03 (ref 1–1.03)
TOTAL PROTEIN: 7 G/DL (ref 6.4–8.2)
TROPONIN: <0.01 NG/ML
URINE REFLEX TO CULTURE: YES
URINE TYPE: ABNORMAL
UROBILINOGEN, URINE: 0.2 E.U./DL
WBC # BLD: 4.8 K/UL (ref 4–11)
WBC UA: 5 /HPF (ref 0–5)

## 2020-04-17 PROCEDURE — 84484 ASSAY OF TROPONIN QUANT: CPT

## 2020-04-17 PROCEDURE — 93005 ELECTROCARDIOGRAM TRACING: CPT | Performed by: STUDENT IN AN ORGANIZED HEALTH CARE EDUCATION/TRAINING PROGRAM

## 2020-04-17 PROCEDURE — 2500000003 HC RX 250 WO HCPCS: Performed by: STUDENT IN AN ORGANIZED HEALTH CARE EDUCATION/TRAINING PROGRAM

## 2020-04-17 PROCEDURE — 6360000002 HC RX W HCPCS

## 2020-04-17 PROCEDURE — 80053 COMPREHEN METABOLIC PANEL: CPT

## 2020-04-17 PROCEDURE — 99284 EMERGENCY DEPT VISIT MOD MDM: CPT

## 2020-04-17 PROCEDURE — 81001 URINALYSIS AUTO W/SCOPE: CPT

## 2020-04-17 PROCEDURE — 83690 ASSAY OF LIPASE: CPT

## 2020-04-17 PROCEDURE — 87086 URINE CULTURE/COLONY COUNT: CPT

## 2020-04-17 PROCEDURE — 6360000004 HC RX CONTRAST MEDICATION: Performed by: STUDENT IN AN ORGANIZED HEALTH CARE EDUCATION/TRAINING PROGRAM

## 2020-04-17 PROCEDURE — 85610 PROTHROMBIN TIME: CPT

## 2020-04-17 PROCEDURE — 6370000000 HC RX 637 (ALT 250 FOR IP): Performed by: STUDENT IN AN ORGANIZED HEALTH CARE EDUCATION/TRAINING PROGRAM

## 2020-04-17 PROCEDURE — 71045 X-RAY EXAM CHEST 1 VIEW: CPT

## 2020-04-17 PROCEDURE — 85025 COMPLETE CBC W/AUTO DIFF WBC: CPT

## 2020-04-17 PROCEDURE — 96374 THER/PROPH/DIAG INJ IV PUSH: CPT

## 2020-04-17 PROCEDURE — 2580000003 HC RX 258: Performed by: STUDENT IN AN ORGANIZED HEALTH CARE EDUCATION/TRAINING PROGRAM

## 2020-04-17 PROCEDURE — 74177 CT ABD & PELVIS W/CONTRAST: CPT

## 2020-04-17 RX ORDER — 0.9 % SODIUM CHLORIDE 0.9 %
1000 INTRAVENOUS SOLUTION INTRAVENOUS ONCE
Status: COMPLETED | OUTPATIENT
Start: 2020-04-17 | End: 2020-04-17

## 2020-04-17 RX ORDER — ONDANSETRON 2 MG/ML
INJECTION INTRAMUSCULAR; INTRAVENOUS
Status: COMPLETED
Start: 2020-04-17 | End: 2020-04-17

## 2020-04-17 RX ADMIN — ONDANSETRON 4 MG: 2 INJECTION INTRAMUSCULAR; INTRAVENOUS at 17:56

## 2020-04-17 RX ADMIN — SODIUM CHLORIDE 1000 ML: 9 INJECTION, SOLUTION INTRAVENOUS at 18:28

## 2020-04-17 RX ADMIN — IOPAMIDOL 75 ML: 755 INJECTION, SOLUTION INTRAVENOUS at 18:34

## 2020-04-17 RX ADMIN — Medication 20 MG: at 19:24

## 2020-04-17 RX ADMIN — LIDOCAINE HYDROCHLORIDE: 20 SOLUTION ORAL; TOPICAL at 19:24

## 2020-04-17 ASSESSMENT — PAIN SCALES - GENERAL
PAINLEVEL_OUTOF10: 5
PAINLEVEL_OUTOF10: 0

## 2020-04-17 ASSESSMENT — PAIN DESCRIPTION - LOCATION: LOCATION: ABDOMEN;CHEST

## 2020-04-17 ASSESSMENT — PAIN - FUNCTIONAL ASSESSMENT: PAIN_FUNCTIONAL_ASSESSMENT: 0-10

## 2020-04-17 ASSESSMENT — PAIN DESCRIPTION - PAIN TYPE: TYPE: ACUTE PAIN

## 2020-04-18 LAB
EKG ATRIAL RATE: 72 BPM
EKG ATRIAL RATE: 77 BPM
EKG DIAGNOSIS: NORMAL
EKG DIAGNOSIS: NORMAL
EKG P AXIS: 24 DEGREES
EKG P AXIS: 37 DEGREES
EKG P-R INTERVAL: 140 MS
EKG P-R INTERVAL: 150 MS
EKG Q-T INTERVAL: 402 MS
EKG Q-T INTERVAL: 404 MS
EKG QRS DURATION: 100 MS
EKG QRS DURATION: 92 MS
EKG QTC CALCULATION (BAZETT): 440 MS
EKG QTC CALCULATION (BAZETT): 457 MS
EKG R AXIS: -10 DEGREES
EKG R AXIS: -2 DEGREES
EKG T AXIS: 118 DEGREES
EKG T AXIS: 75 DEGREES
EKG VENTRICULAR RATE: 72 BPM
EKG VENTRICULAR RATE: 77 BPM

## 2020-04-18 PROCEDURE — 93010 ELECTROCARDIOGRAM REPORT: CPT | Performed by: INTERNAL MEDICINE

## 2020-04-19 ENCOUNTER — CARE COORDINATION (OUTPATIENT)
Dept: CARE COORDINATION | Age: 80
End: 2020-04-19

## 2020-04-19 LAB — URINE CULTURE, ROUTINE: NORMAL

## 2020-05-06 ENCOUNTER — CARE COORDINATION (OUTPATIENT)
Dept: CARE COORDINATION | Age: 80
End: 2020-05-06

## 2022-12-06 ENCOUNTER — HOSPITAL ENCOUNTER (INPATIENT)
Age: 82
LOS: 2 days | Discharge: HOME | End: 2022-12-08
Attending: EMERGENCY MEDICINE | Admitting: HOSPITALIST
Payer: MEDICARE

## 2022-12-06 ENCOUNTER — APPOINTMENT (OUTPATIENT)
Dept: CT IMAGING | Age: 82
End: 2022-12-06
Payer: MEDICARE

## 2022-12-06 ENCOUNTER — APPOINTMENT (OUTPATIENT)
Dept: GENERAL RADIOLOGY | Age: 82
End: 2022-12-06
Payer: MEDICARE

## 2022-12-06 DIAGNOSIS — Z86.79 HISTORY OF CORONARY ARTERY DISEASE: ICD-10-CM

## 2022-12-06 DIAGNOSIS — R07.9 CHEST PAIN, UNSPECIFIED TYPE: Primary | ICD-10-CM

## 2022-12-06 DIAGNOSIS — R55 SYNCOPE AND COLLAPSE: ICD-10-CM

## 2022-12-06 LAB
ANION GAP SERPL CALCULATED.3IONS-SCNC: 7 MMOL/L (ref 3–16)
BACTERIA: ABNORMAL /HPF
BASOPHILS ABSOLUTE: 0 K/UL (ref 0–0.2)
BASOPHILS RELATIVE PERCENT: 0.5 %
BILIRUBIN URINE: NEGATIVE
BLOOD, URINE: NEGATIVE
BUN BLDV-MCNC: 15 MG/DL (ref 7–20)
CALCIUM SERPL-MCNC: 9.3 MG/DL (ref 8.3–10.6)
CHLORIDE BLD-SCNC: 99 MMOL/L (ref 99–110)
CLARITY: CLEAR
CO2: 29 MMOL/L (ref 21–32)
COLOR: YELLOW
CREAT SERPL-MCNC: <0.5 MG/DL (ref 0.6–1.2)
EKG ATRIAL RATE: 66 BPM
EKG DIAGNOSIS: NORMAL
EKG P AXIS: 64 DEGREES
EKG P-R INTERVAL: 144 MS
EKG Q-T INTERVAL: 412 MS
EKG QRS DURATION: 82 MS
EKG QTC CALCULATION (BAZETT): 431 MS
EKG R AXIS: -1 DEGREES
EKG T AXIS: 117 DEGREES
EKG VENTRICULAR RATE: 66 BPM
EOSINOPHILS ABSOLUTE: 0.3 K/UL (ref 0–0.6)
EOSINOPHILS RELATIVE PERCENT: 5.7 %
EPITHELIAL CELLS, UA: 3 /HPF (ref 0–5)
GFR SERPL CREATININE-BSD FRML MDRD: >60 ML/MIN/{1.73_M2}
GLUCOSE BLD-MCNC: 138 MG/DL (ref 70–99)
GLUCOSE URINE: NEGATIVE MG/DL
HCT VFR BLD CALC: 34.6 % (ref 36–48)
HEMOGLOBIN: 11.5 G/DL (ref 12–16)
HYALINE CASTS: 4 /LPF (ref 0–8)
INR BLD: 1.66 (ref 0.87–1.14)
KETONES, URINE: NEGATIVE MG/DL
LEUKOCYTE ESTERASE, URINE: ABNORMAL
LYMPHOCYTES ABSOLUTE: 1.1 K/UL (ref 1–5.1)
LYMPHOCYTES RELATIVE PERCENT: 18.6 %
MCH RBC QN AUTO: 30 PG (ref 26–34)
MCHC RBC AUTO-ENTMCNC: 33.1 G/DL (ref 31–36)
MCV RBC AUTO: 90.7 FL (ref 80–100)
MICROSCOPIC EXAMINATION: YES
MONOCYTES ABSOLUTE: 0.6 K/UL (ref 0–1.3)
MONOCYTES RELATIVE PERCENT: 9.9 %
NEUTROPHILS ABSOLUTE: 4 K/UL (ref 1.7–7.7)
NEUTROPHILS RELATIVE PERCENT: 65.3 %
NITRITE, URINE: NEGATIVE
PDW BLD-RTO: 14 % (ref 12.4–15.4)
PH UA: 5.5 (ref 5–8)
PLATELET # BLD: 187 K/UL (ref 135–450)
PMV BLD AUTO: 7.8 FL (ref 5–10.5)
POTASSIUM REFLEX MAGNESIUM: 4 MMOL/L (ref 3.5–5.1)
PRO-BNP: 544 PG/ML (ref 0–449)
PRO-BNP: 770 PG/ML (ref 0–449)
PROTEIN UA: NEGATIVE MG/DL
PROTHROMBIN TIME: 19.6 SEC (ref 11.7–14.5)
RAPID INFLUENZA  B AGN: NEGATIVE
RAPID INFLUENZA A AGN: NEGATIVE
RBC # BLD: 3.82 M/UL (ref 4–5.2)
RBC UA: 1 /HPF (ref 0–4)
SARS-COV-2, NAAT: NOT DETECTED
SODIUM BLD-SCNC: 135 MMOL/L (ref 136–145)
SPECIFIC GRAVITY UA: 1.01 (ref 1–1.03)
TROPONIN: <0.01 NG/ML
URINE REFLEX TO CULTURE: YES
URINE TYPE: ABNORMAL
UROBILINOGEN, URINE: 1 E.U./DL
WBC # BLD: 6.1 K/UL (ref 4–11)
WBC UA: 22 /HPF (ref 0–5)

## 2022-12-06 PROCEDURE — 1200000000 HC SEMI PRIVATE

## 2022-12-06 PROCEDURE — 93005 ELECTROCARDIOGRAM TRACING: CPT | Performed by: EMERGENCY MEDICINE

## 2022-12-06 PROCEDURE — 99285 EMERGENCY DEPT VISIT HI MDM: CPT

## 2022-12-06 PROCEDURE — 87804 INFLUENZA ASSAY W/OPTIC: CPT

## 2022-12-06 PROCEDURE — 81001 URINALYSIS AUTO W/SCOPE: CPT

## 2022-12-06 PROCEDURE — 93010 ELECTROCARDIOGRAM REPORT: CPT | Performed by: INTERNAL MEDICINE

## 2022-12-06 PROCEDURE — 85025 COMPLETE CBC W/AUTO DIFF WBC: CPT

## 2022-12-06 PROCEDURE — 71045 X-RAY EXAM CHEST 1 VIEW: CPT

## 2022-12-06 PROCEDURE — 85610 PROTHROMBIN TIME: CPT

## 2022-12-06 PROCEDURE — 6370000000 HC RX 637 (ALT 250 FOR IP): Performed by: HOSPITALIST

## 2022-12-06 PROCEDURE — 84484 ASSAY OF TROPONIN QUANT: CPT

## 2022-12-06 PROCEDURE — 87086 URINE CULTURE/COLONY COUNT: CPT

## 2022-12-06 PROCEDURE — 87635 SARS-COV-2 COVID-19 AMP PRB: CPT

## 2022-12-06 PROCEDURE — 70450 CT HEAD/BRAIN W/O DYE: CPT

## 2022-12-06 PROCEDURE — 80048 BASIC METABOLIC PNL TOTAL CA: CPT

## 2022-12-06 PROCEDURE — 36415 COLL VENOUS BLD VENIPUNCTURE: CPT

## 2022-12-06 PROCEDURE — 83880 ASSAY OF NATRIURETIC PEPTIDE: CPT

## 2022-12-06 RX ORDER — ACETAMINOPHEN 325 MG/1
650 TABLET ORAL EVERY 6 HOURS PRN
Status: DISCONTINUED | OUTPATIENT
Start: 2022-12-06 | End: 2022-12-08 | Stop reason: HOSPADM

## 2022-12-06 RX ORDER — ERYTHROMYCIN 5 MG/G
OINTMENT OPHTHALMIC PRN
Status: DISCONTINUED | OUTPATIENT
Start: 2022-12-06 | End: 2022-12-06

## 2022-12-06 RX ORDER — LISINOPRIL 5 MG/1
5 TABLET ORAL DAILY
Status: DISCONTINUED | OUTPATIENT
Start: 2022-12-06 | End: 2022-12-06

## 2022-12-06 RX ORDER — SODIUM CHLORIDE 0.9 % (FLUSH) 0.9 %
10 SYRINGE (ML) INJECTION EVERY 12 HOURS SCHEDULED
Status: DISCONTINUED | OUTPATIENT
Start: 2022-12-06 | End: 2022-12-08 | Stop reason: HOSPADM

## 2022-12-06 RX ORDER — POLYETHYLENE GLYCOL 3350 17 G/17G
17 POWDER, FOR SOLUTION ORAL DAILY PRN
Status: DISCONTINUED | OUTPATIENT
Start: 2022-12-06 | End: 2022-12-08 | Stop reason: HOSPADM

## 2022-12-06 RX ORDER — SODIUM CHLORIDE 9 MG/ML
INJECTION, SOLUTION INTRAVENOUS PRN
Status: DISCONTINUED | OUTPATIENT
Start: 2022-12-06 | End: 2022-12-08 | Stop reason: HOSPADM

## 2022-12-06 RX ORDER — FUROSEMIDE 20 MG/1
TABLET ORAL
COMMUNITY
Start: 2022-08-31

## 2022-12-06 RX ORDER — GLYCOPYRROLATE 2 MG/1
2 TABLET ORAL 2 TIMES DAILY
Status: DISCONTINUED | OUTPATIENT
Start: 2022-12-06 | End: 2022-12-06

## 2022-12-06 RX ORDER — ENOXAPARIN SODIUM 100 MG/ML
40 INJECTION SUBCUTANEOUS DAILY
Status: DISCONTINUED | OUTPATIENT
Start: 2022-12-07 | End: 2022-12-08 | Stop reason: HOSPADM

## 2022-12-06 RX ORDER — PANTOPRAZOLE SODIUM 40 MG/1
40 TABLET, DELAYED RELEASE ORAL
Status: DISCONTINUED | OUTPATIENT
Start: 2022-12-07 | End: 2022-12-08 | Stop reason: HOSPADM

## 2022-12-06 RX ORDER — NITROGLYCERIN 0.4 MG/1
0.4 TABLET SUBLINGUAL EVERY 5 MIN PRN
Status: DISCONTINUED | OUTPATIENT
Start: 2022-12-06 | End: 2022-12-08 | Stop reason: HOSPADM

## 2022-12-06 RX ORDER — FAMOTIDINE 20 MG/1
20 TABLET, FILM COATED ORAL 2 TIMES DAILY
COMMUNITY
Start: 2022-09-24

## 2022-12-06 RX ORDER — ATORVASTATIN CALCIUM 20 MG/1
20 TABLET, FILM COATED ORAL NIGHTLY
Status: DISCONTINUED | OUTPATIENT
Start: 2022-12-06 | End: 2022-12-08 | Stop reason: HOSPADM

## 2022-12-06 RX ORDER — POTASSIUM CHLORIDE 750 MG/1
20 TABLET, FILM COATED, EXTENDED RELEASE ORAL
Status: DISCONTINUED | OUTPATIENT
Start: 2022-12-07 | End: 2022-12-08 | Stop reason: HOSPADM

## 2022-12-06 RX ORDER — ASPIRIN 81 MG/1
81 TABLET, CHEWABLE ORAL DAILY
Status: DISCONTINUED | OUTPATIENT
Start: 2022-12-07 | End: 2022-12-08 | Stop reason: HOSPADM

## 2022-12-06 RX ORDER — DULOXETIN HYDROCHLORIDE 20 MG/1
20 CAPSULE, DELAYED RELEASE ORAL DAILY
Status: DISCONTINUED | OUTPATIENT
Start: 2022-12-07 | End: 2022-12-08 | Stop reason: HOSPADM

## 2022-12-06 RX ORDER — POTASSIUM CHLORIDE 20 MEQ/1
40 TABLET, EXTENDED RELEASE ORAL PRN
Status: DISCONTINUED | OUTPATIENT
Start: 2022-12-06 | End: 2022-12-08 | Stop reason: HOSPADM

## 2022-12-06 RX ORDER — ROPINIROLE 1 MG/1
1 TABLET, FILM COATED ORAL
Status: DISCONTINUED | OUTPATIENT
Start: 2022-12-06 | End: 2022-12-08 | Stop reason: HOSPADM

## 2022-12-06 RX ORDER — ACETAMINOPHEN 650 MG/1
650 SUPPOSITORY RECTAL EVERY 6 HOURS PRN
Status: DISCONTINUED | OUTPATIENT
Start: 2022-12-06 | End: 2022-12-08 | Stop reason: HOSPADM

## 2022-12-06 RX ORDER — SODIUM CHLORIDE 9 MG/ML
INJECTION, SOLUTION INTRAVENOUS CONTINUOUS
Status: DISCONTINUED | OUTPATIENT
Start: 2022-12-06 | End: 2022-12-06

## 2022-12-06 RX ORDER — FUROSEMIDE 20 MG/1
20 TABLET ORAL EVERY EVENING
Status: DISCONTINUED | OUTPATIENT
Start: 2022-12-06 | End: 2022-12-08 | Stop reason: HOSPADM

## 2022-12-06 RX ORDER — DULOXETIN HYDROCHLORIDE 20 MG/1
20 CAPSULE, DELAYED RELEASE ORAL DAILY
COMMUNITY
Start: 2022-12-01

## 2022-12-06 RX ORDER — FAMOTIDINE 20 MG/1
20 TABLET, FILM COATED ORAL 2 TIMES DAILY
Status: DISCONTINUED | OUTPATIENT
Start: 2022-12-06 | End: 2022-12-08 | Stop reason: HOSPADM

## 2022-12-06 RX ORDER — LISINOPRIL 10 MG/1
10 TABLET ORAL DAILY
Status: DISCONTINUED | OUTPATIENT
Start: 2022-12-07 | End: 2022-12-08 | Stop reason: HOSPADM

## 2022-12-06 RX ORDER — SODIUM CHLORIDE 0.9 % (FLUSH) 0.9 %
10 SYRINGE (ML) INJECTION PRN
Status: DISCONTINUED | OUTPATIENT
Start: 2022-12-06 | End: 2022-12-08 | Stop reason: HOSPADM

## 2022-12-06 RX ORDER — ROPINIROLE 1 MG/1
1 TABLET, FILM COATED ORAL 3 TIMES DAILY
Status: DISCONTINUED | OUTPATIENT
Start: 2022-12-06 | End: 2022-12-06

## 2022-12-06 RX ORDER — AMLODIPINE BESYLATE 5 MG/1
10 TABLET ORAL DAILY
Status: DISCONTINUED | OUTPATIENT
Start: 2022-12-06 | End: 2022-12-06

## 2022-12-06 RX ORDER — ONDANSETRON 4 MG/1
4 TABLET, ORALLY DISINTEGRATING ORAL EVERY 8 HOURS PRN
Status: DISCONTINUED | OUTPATIENT
Start: 2022-12-06 | End: 2022-12-08 | Stop reason: HOSPADM

## 2022-12-06 RX ORDER — ATENOLOL 50 MG/1
50 TABLET ORAL DAILY
Status: DISCONTINUED | OUTPATIENT
Start: 2022-12-07 | End: 2022-12-08 | Stop reason: HOSPADM

## 2022-12-06 RX ORDER — ONDANSETRON 2 MG/ML
4 INJECTION INTRAMUSCULAR; INTRAVENOUS EVERY 6 HOURS PRN
Status: DISCONTINUED | OUTPATIENT
Start: 2022-12-06 | End: 2022-12-08 | Stop reason: HOSPADM

## 2022-12-06 RX ORDER — ASPIRIN 81 MG/1
81 TABLET, CHEWABLE ORAL DAILY
Status: DISCONTINUED | OUTPATIENT
Start: 2022-12-07 | End: 2022-12-07 | Stop reason: SDUPTHER

## 2022-12-06 RX ORDER — ATORVASTATIN CALCIUM 10 MG/1
10 TABLET, FILM COATED ORAL
Status: DISCONTINUED | OUTPATIENT
Start: 2022-12-06 | End: 2022-12-08 | Stop reason: HOSPADM

## 2022-12-06 RX ORDER — HYDROCODONE BITARTRATE AND ACETAMINOPHEN 5; 325 MG/1; MG/1
1 TABLET ORAL NIGHTLY PRN
Status: DISCONTINUED | OUTPATIENT
Start: 2022-12-06 | End: 2022-12-08 | Stop reason: HOSPADM

## 2022-12-06 RX ORDER — POTASSIUM CHLORIDE 7.45 MG/ML
10 INJECTION INTRAVENOUS PRN
Status: DISCONTINUED | OUTPATIENT
Start: 2022-12-06 | End: 2022-12-08 | Stop reason: HOSPADM

## 2022-12-06 RX ORDER — FUROSEMIDE 40 MG/1
40 TABLET ORAL DAILY
Status: DISCONTINUED | OUTPATIENT
Start: 2022-12-06 | End: 2022-12-08 | Stop reason: HOSPADM

## 2022-12-06 RX ADMIN — FAMOTIDINE 20 MG: 20 TABLET, FILM COATED ORAL at 21:06

## 2022-12-06 RX ADMIN — ATORVASTATIN CALCIUM 10 MG: 10 TABLET, FILM COATED ORAL at 21:06

## 2022-12-06 RX ADMIN — ATORVASTATIN CALCIUM 20 MG: 20 TABLET, FILM COATED ORAL at 21:05

## 2022-12-06 RX ADMIN — ROPINIROLE HYDROCHLORIDE 1 MG: 1 TABLET, FILM COATED ORAL at 17:45

## 2022-12-06 RX ADMIN — FUROSEMIDE 20 MG: 20 TABLET ORAL at 17:45

## 2022-12-06 RX ADMIN — ROPINIROLE HYDROCHLORIDE 1 MG: 1 TABLET, FILM COATED ORAL at 22:33

## 2022-12-06 RX ADMIN — ROPINIROLE HYDROCHLORIDE 1 MG: 1 TABLET, FILM COATED ORAL at 15:39

## 2022-12-06 RX ADMIN — METOPROLOL TARTRATE 25 MG: 25 TABLET, FILM COATED ORAL at 21:05

## 2022-12-06 RX ADMIN — FUROSEMIDE 40 MG: 40 TABLET ORAL at 16:53

## 2022-12-06 ASSESSMENT — PAIN SCALES - GENERAL
PAINLEVEL_OUTOF10: 0

## 2022-12-06 ASSESSMENT — LIFESTYLE VARIABLES: HOW OFTEN DO YOU HAVE A DRINK CONTAINING ALCOHOL: NEVER

## 2022-12-06 NOTE — ED NOTES
ED SBAR report provider to [place], [address] [facility]. Patient to be transported to Room 4107 via stretcher by transport tech. Patient transported with bedside cardiac monitor and with IV medications infusing. IV site clean, dry, and intact. MEWS score and pain assessed as 0/10 and documented. Updated patient and family on plan of care.      Stevenson Herbert RN  12/06/22 1144

## 2022-12-06 NOTE — ED NOTES
Pt to ED via [facility] with c/o mid sternal chest pain that radiated to the left, started this morning. EMS gave pt 324 asa en route to ED . Hx of stent placement. Pt alert and oriented x4 upon ED arrival and denies chest pain at current time. Pt placed on cardiac monitor upon ED arrival, EKG obtained, IV placed and ordered labs being drawn and sent to lab. Pt states she fell 1 week ago in her basement, bruise noted under right eye. Pt states she takes coumadin for hx of a-fib. TREVOR Mcdonald at bedside.         Graham De Oliveira, RN  12/06/22 9517

## 2022-12-06 NOTE — ED PROVIDER NOTES
Attending Supervisory Note/Shared Visit   I have personally performed a face to face diagnostic evaluation on this patient. I have reviewed the mid-levels findings and agree. History and Exam by me shows no acute distress. She had an episode of substernal pressure-like chest discomfort this morning. She is not sure exactly how long it lasted. It is gone now. She has a history of coronary artery disease. She states she has 4 stents. She has a history of paroxysmal atrial fibrillation. She is on Coumadin. She is followed by Dr. Tomás Cedeno at [facility].  She has a bruise adjacent to her right eye. She states she sustained that a week ago Monday. She was in her basement and she felt funny. She sat on a barstool. The next thing she remembers is waking up on the floor. General: Alert moderately obese female in no acute distress. ENT: Bruising in the area of the right lateral canthus and right lateral supraorbital ridge. Pupils equal round reactive. Extraocular movements are intact. Nose is clear. Oropharynx negative. Heart: Regular rate and rhythm. 2/6 systolic murmur left sternal border. No gallop. Lungs: Breath sounds equal bilaterally and clear. Abdomen: Obese, soft, nontender. Neuro: Awake, alert, oriented. No focal motor deficits. EKG: Normal sinus rhythm, rate of 66, nonspecific T wave flattening diffusely. Rhythm strip shows a sinus rhythm with a rate of 66, MN interval 144 ms, QRS of 82 ms with no other ectopy as interpreted by me. This is compared to an EKG dated 4/17/2020, significant changes noted. Lab reviewed. H&H of 11.5 and 34.6. White blood count 6100 with 65 neutrophils 19 lymphs. Sodium 135 with a potassium 4.0.  BUN of 15 with a creatinine of less than 0.5. Troponin less than 0.01. COVID-negative. Flu negative. CT head: No acute intracranial abnormality. Chest x-ray: No acute abnormality. Etiology of her chest discomfort is not clear.   She certainly is at significant risk for cardiac etiology for her symptoms. She is not tachycardic. She is not hypoxic. She is having no chest pain at this time. I have a low index suspicion for PE. Her pain is not typical of aortic dissection. She has no other acute cardiopulmonary findings on chest x-ray. Her pain is not typical of GERD or esophageal reflux. I feel that admission for observation serial troponins and further work-up is indicated. Also of concern is a syncopal episode she had a week ago. It was unprovoked, she did have some prodromal symptoms where she did not feel right, the next thing she remembers that she woke up on the floor. She has had no further episodes since then. She has no acute findings on her CT head. She is anticoagulated. Test results, diagnosis, and treatment plan were discussed the patient and her daughter. They understand the treatment plan and need for admission and are agreeable. 1. Chest pain, unspecified type    2. History of coronary artery disease    3.  Syncope and collapse      Disposition:  Admit      (Please note that portions of this note were completed with a voice recognition program.  Efforts were made to edit the dictations but occasionally words are mis-transcribed.)    Angel Leyva MD  Attending Emergency Physician        Anup Murillo MD  12/06/22 1109

## 2022-12-06 NOTE — PROGRESS NOTES
Pharmacy Medication Reconciliation Note     List of medications patient is currently taking is complete. Source of information:   1. Conversation with patient and patient's family at bedside  2. EMR    Notes regarding home medications:   1. Patient had all of her morning home medication doses today before presenting to the ER. 2. Patient is on warfarin 5 mg on Monday, Wednesday, and Friday and 2.5 mg all other days. Patient is on warfarin for afib and her goal INR is 2-3. Patient did not have her dose today as she normally takes it in the evening. 3. Many changes made to home med list after admission orders placed.       [address] [facility] Shae Regalado  12/6/2022 1:02 PM

## 2022-12-06 NOTE — ED PROVIDER NOTES
[address] [facility]        Pt Name: Elda Chen  MRN: [id number]  [place] 1940  Date of evaluation: 12/6/2022  Provider: TREVOR Morley  PCP: Todd Senior  Note Started: 10:40 AM EST     This patient was also seen and evaluated by the attending physician Corrinne Hastings, MD.    [facility]       Chief Complaint   Patient presents with    Chest Pain     Pt to ED via [facility] with c/o mid sternal chest pain that radiated to the left, started this morning. Pt denies chest pain at current time. EMS gave pt 324 asa en route to ED . Hx of stent placement. HISTORY OF PRESENT ILLNESS   (Location, Timing/Onset, Context/Setting, Quality, Duration, Modifying Factors, Severity, Associated Signs and Symptoms)  Note limiting factors. Elda Chen is a 81 y.o. female who presents with complaint of an episode of chest pain. Says it started shortly before she called for an ambulance, but she really does not know what time that was. Says it lasted for long enough that she got concerned because it had gone away, but she is not sure how long it lasted. Lives alone. Says the pain went away and now she is not having any pain. She reports that she has had a lot of sinus problems recently which makes her short of breath, but that has been constant for several days, no changes in her breathing today. Denies abdominal pain, nausea or vomiting. Says she does have a history of multiple coronary artery stents and atrial fibrillation. Sees cardiology at [address] [place] [address] warfarin regularly. Says her family tells her she has not been eating enough lately. She also reports that 8 days ago she had an episode in her basement where she lost consciousness, fell and hit the right periorbital area, leaving some bruising there. .  Her daughter came to check her out but she did not come to the hospital to be evaluated. No more recent LOC episodes. She denies headache, confusion, focal weakness, numbness. Says there is not much pain around her eye anymore, vision normal, eye movement normal.    Nursing Notes were all reviewed and agreed with or any disagreements were addressed in the HPI. REVIEW OF SYSTEMS    (2-9 systems for level 4, 10 or more for level 5)     Positives and pertinent negatives as per HPI.      PAST MEDICAL HISTORY     Past Medical History:   Diagnosis Date    A-fib ([address]     Acid reflux     Arthritis     Atrial fibrillation (HCC)     CAD (coronary artery disease)     Diarrhea     Hyperlipidemia     Hypertension     Restless leg     Stomach ulcer     TIA (transient ischemic attack) 1995    NO RESIDUAL       SURGICAL HISTORY     Past Surgical History:   Procedure Laterality Date    CHOLECYSTECTOMY      COLONOSCOPY      CORONARY ANGIOPLASTY WITH STENT PLACEMENT  2014    X4    DILATION AND CURETTAGE OF UTERUS N/A 4/2/2019    HYSTEROSCOPY DILATION AND CURETTAGE POLYP REMOVAL performed by Veronica Collins MD at [facility]       Previous Medications    AMLODIPINE (NORVASC) 10 MG TABLET    TAKE 1 TABLET EVERY DAY (NEED MD APPOINTMENT FOR FURTHER REFILLS)    ASPIRIN 81 MG TABLET    Take 1 tablet by mouth daily     ATENOLOL (TENORMIN) 50 MG TABLET    TAKE 1 TABLET EVERY DAY (NEED MD APPOINTMENT FOR FURTHER REFILLS)    BISMUTH SUBSALICYLATE (KAOPECTATE) 262 MG TABS    Take 1 tablet by mouth as needed    CHLORPHENIRAMINE-DM (CORICIDIN HBP COUGH/COLD PO)    Take 1 tablet by mouth as needed    ERYTHROMYCIN (ROMYCIN) 5 MG/GM OPHTHALMIC OINTMENT    Place into both eyes as needed     GLUCOSAMINE-CHONDROITIN 250-200 MG TABS    Take 1 capsule by mouth daily     GLYCOPYRROLATE (ROBINUL) 2 MG TABLET    Take 2 mg by mouth 2 times daily    HYDROCODONE-ACETAMINOPHEN (NORCO) 5-325 MG PER TABLET    Take 1 tablet by mouth nightly as needed for Pain. 1-2 TABS    LISINOPRIL (PRINIVIL;ZESTRIL) 5 MG TABLET    Take 5 mg by mouth daily    LOVASTATIN (MEVACOR) 40 MG TABLET    TAKE 1 TABLET EVERY DAY WITH DINNER    MILK THISTLE PO    Take 1 tablet by mouth 2 times daily     MULTIPLE VITAMINS-MINERALS (CENTRUM SILVER PO)    Take 1 tablet by mouth daily    NITROGLYCERIN (NITROSTAT) 0.4 MG SL TABLET    Place 0.4 mg under the tongue every 5 minutes as needed     PANTOPRAZOLE (PROTONIX) 40 MG TABLET    TAKE 1 TABLET BY MOUTH DAILY IN EVENING    POTASSIUM CHLORIDE (KLOR-CON M) 20 MEQ TBCR EXTENDED RELEASE TABLET    Take 20 mEq by mouth daily (with breakfast)    ROPINIROLE (REQUIP) 0.5 MG TABLET    TAKE 1 TABLET 6 TIMES DAILY    TRAMADOL (ULTRAM) 50 MG TABLET    Take 50 mg by mouth every 6 hours as needed for Pain. TURMERIC 500 MG CAPS    Take 1 tablet by mouth daily     VITAMIN B COMPLEX-C PO    Take 1 tablet by mouth daily     VITAMIN E 400 UNIT CAPSULE    Take 400 Units by mouth daily    WARFARIN (COUMADIN) 5 MG TABLET    TAKE 1 TABLET DAILY AS INSTRUCTED PENDING INR RESULTS       ALLERGIES     Penicillins    FAMILYHISTORY     History reviewed. No pertinent family history. SOCIAL HISTORY       Social History     Tobacco Use    Smoking status: Never    Smokeless tobacco: Never   Vaping Use    Vaping Use: Never used   Substance Use Topics    Alcohol use: Yes     Comment: OCCAS    Drug use: Never       SCREENINGS    [place] Coma Scale  Eye Opening: Spontaneous  Best Verbal Response: Oriented  Best Motor Response: Obeys commands  [place] Coma Scale Score: 15      PHYSICAL EXAM    (up to 7 for level 4, 8 or more for level 5)     ED Triage Vitals [12/06/22 1027]   BP Temp Temp Source Heart Rate Resp SpO2 Height Weight   (!) 122/56 97.9 °F (36.6 °C) Oral 65 16 98 % 5' 7\" (1.702 m) 181 lb 10.5 oz (82.4 kg)       Physical Exam  Vitals and nursing note reviewed. Constitutional:       General: She is not in acute distress. Appearance: Normal appearance.  She is not ill-appearing. HENT:      Head: Normocephalic. Comments: There is some mild ecchymosis noted in the right periorbital area, mostly lateral to the eye, yellowish in color, with no significant bony tenderness in the area or throughout the facial bones otherwise. Nose: Nose normal.   Eyes:      General:         Right eye: No discharge. Left eye: No discharge. Extraocular Movements: Extraocular movements intact. Pupils: Pupils are equal, round, and reactive to light. Cardiovascular:      Rate and Rhythm: Normal rate and regular rhythm. Heart sounds: Normal heart sounds. Pulmonary:      Effort: Pulmonary effort is normal. No respiratory distress. Breath sounds: Normal breath sounds. No stridor. No wheezing, rhonchi or rales. Musculoskeletal:         General: Normal range of motion. Cervical back: Normal range of motion. Skin:     General: Skin is warm and dry. Neurological:      General: No focal deficit present. Mental Status: She is alert and oriented to person, place, and time.    Psychiatric:         Mood and Affect: Mood normal.         Behavior: Behavior normal.       DIAGNOSTIC RESULTS   LABS:    Labs Reviewed   CBC WITH AUTO DIFFERENTIAL - Abnormal; Notable for the following components:       Result Value    RBC 3.82 (*)     Hemoglobin 11.5 (*)     Hematocrit 34.6 (*)     All other components within normal limits   BASIC METABOLIC PANEL W/ REFLEX TO MG FOR LOW K - Abnormal; Notable for the following components:    Sodium 135 (*)     Glucose 138 (*)     Creatinine <0.5 (*)     All other components within normal limits   BRAIN NATRIURETIC PEPTIDE - Abnormal; Notable for the following components:    Pro- (*)     All other components within normal limits   PROTIME-INR - Abnormal; Notable for the following components:    Protime 19.6 (*)     INR 1.66 (*)     All other components within normal limits   COVID-19, RAPID   RAPID INFLUENZA A/B ANTIGENS TROPONIN   URINALYSIS WITH REFLEX TO CULTURE       When ordered only abnormal lab results are displayed. All other labs were within normal range or not returned as of this dictation. EKG: When ordered, EKG's are interpreted by the Emergency Department Physician in the absence of a cardiologist.  Please see their note for interpretation of EKG. RADIOLOGY:   All images such as plain radiographs, CT, Ultrasound and MRI are interpreted by a radiologist. Some images are visualized and preliminarily interpreted by me and/or the ED attending physician. Interpretation per the radiologist below, if available at the time of this note:    CT Head W/O Contrast   Final Result   No acute intracranial abnormality      Atrophy and small-vessel ischemic change      Diffuse paranasal sinus disease, greatest in the ethmoids and left maxillary   sinus. XR CHEST PORTABLE   Final Result   Stable exam.  No acute finding. CONSULTS:  None    PROCEDURES   Unless otherwise noted below, none. Procedures    EMERGENCY DEPARTMENT COURSE and DIFFERENTIAL DIAGNOSIS/MDM:   Vitals:    Vitals:    12/06/22 1041 12/06/22 1111 12/06/22 1141 12/06/22 1211   BP: 113/60 (!) 107/59 (!) 120/58 135/73   Pulse: 66 71 61 65   Resp: 13 14 12 16   Temp:       TempSrc:       SpO2: 93% 98% 100% 99%   Weight:       Height:           Patient was given the following medications:  Medications - No data to display        Is this patient to be included in the SEP-1 Core Measure due to severe sepsis or septic shock? No   Exclusion criteria - the patient is NOT to be included for SEP-1 Core Measure due to: Infection is not suspected    Patient's physical exam was normal, she was asymptomatic while in the ED. She has A. fib history but EKG shows a sinus rhythm today.   Vital signs normal.  ED work-up was overall reassuring, including a negative initial troponin, but the patient does have CAD history, does not get chest pain like this often, and has not had a stress test in a little over 3 years. Also had unexplained episode of syncope at home. She would benefit from hospital admission for further care. I consulted the hospitalist, who agreed to accept and admit the patient. The patient and her daughter at bedside verbalized understanding and agreement with this plan of care. CRITICAL CARE TIME   There was a high probability of clinically significant and/or life threatening deterioration in this patient's condition which required my urgent intervention. I independently provided 10 minutes of non-concurrent critical care out of the total shared critical care time provided. This excludes any time for separately reportable procedures. FINAL IMPRESSION      1. Chest pain, unspecified type    2. History of coronary artery disease    3. Syncope and collapse          DISPOSITION/PLAN   DISPOSITION Admitted 12/06/2022 12:46:58 PM      PATIENT REFERRED TO:  No follow-up provider specified.     DISCHARGE MEDICATIONS:  New Prescriptions    No medications on file       DISCONTINUED MEDICATIONS:  Discontinued Medications    RANITIDINE (ZANTAC) 300 MG TABLET    TAKE 1 TABLET TWICE DAILY            (Please note that portions of this note were completed with a voice recognition program.  Efforts were made to edit the dictations but occasionally words are mis-transcribed.)    TREVOR Reece (electronically signed)        Brennan Guan, [id number]  12/06/22 [id number]

## 2022-12-07 LAB
ANION GAP SERPL CALCULATED.3IONS-SCNC: 14 MMOL/L (ref 3–16)
BUN BLDV-MCNC: 14 MG/DL (ref 7–20)
CALCIUM SERPL-MCNC: 9.2 MG/DL (ref 8.3–10.6)
CHLORIDE BLD-SCNC: 101 MMOL/L (ref 99–110)
CHOLESTEROL, TOTAL: 144 MG/DL (ref 0–199)
CO2: 26 MMOL/L (ref 21–32)
CREAT SERPL-MCNC: <0.5 MG/DL (ref 0.6–1.2)
ESTIMATED AVERAGE GLUCOSE: 114 MG/DL
GFR SERPL CREATININE-BSD FRML MDRD: >60 ML/MIN/{1.73_M2}
GLUCOSE BLD-MCNC: 108 MG/DL (ref 70–99)
HBA1C MFR BLD: 5.6 %
HCT VFR BLD CALC: 34.3 % (ref 36–48)
HDLC SERPL-MCNC: 52 MG/DL (ref 40–60)
HEMOGLOBIN: 11.5 G/DL (ref 12–16)
INR BLD: 1.71 (ref 0.87–1.14)
LDL CHOLESTEROL CALCULATED: 67 MG/DL
MCH RBC QN AUTO: 30.1 PG (ref 26–34)
MCHC RBC AUTO-ENTMCNC: 33.5 G/DL (ref 31–36)
MCV RBC AUTO: 90 FL (ref 80–100)
PDW BLD-RTO: 13.8 % (ref 12.4–15.4)
PLATELET # BLD: 201 K/UL (ref 135–450)
PMV BLD AUTO: 8.2 FL (ref 5–10.5)
POTASSIUM REFLEX MAGNESIUM: 3.7 MMOL/L (ref 3.5–5.1)
PROTHROMBIN TIME: 20.1 SEC (ref 11.7–14.5)
RBC # BLD: 3.81 M/UL (ref 4–5.2)
SODIUM BLD-SCNC: 141 MMOL/L (ref 136–145)
TRIGL SERPL-MCNC: 124 MG/DL (ref 0–150)
URINE CULTURE, ROUTINE: NORMAL
VLDLC SERPL CALC-MCNC: 25 MG/DL
WBC # BLD: 6.1 K/UL (ref 4–11)

## 2022-12-07 PROCEDURE — A9502 TC99M TETROFOSMIN: HCPCS

## 2022-12-07 PROCEDURE — 6360000002 HC RX W HCPCS: Performed by: HOSPITALIST

## 2022-12-07 PROCEDURE — 80048 BASIC METABOLIC PNL TOTAL CA: CPT

## 2022-12-07 PROCEDURE — 1200000000 HC SEMI PRIVATE

## 2022-12-07 PROCEDURE — A9502 TC99M TETROFOSMIN: HCPCS | Performed by: HOSPITALIST

## 2022-12-07 PROCEDURE — 93017 CV STRESS TEST TRACING ONLY: CPT

## 2022-12-07 PROCEDURE — 97161 PT EVAL LOW COMPLEX 20 MIN: CPT

## 2022-12-07 PROCEDURE — 2580000003 HC RX 258: Performed by: HOSPITALIST

## 2022-12-07 PROCEDURE — 94760 N-INVAS EAR/PLS OXIMETRY 1: CPT

## 2022-12-07 PROCEDURE — 97165 OT EVAL LOW COMPLEX 30 MIN: CPT

## 2022-12-07 PROCEDURE — 85027 COMPLETE CBC AUTOMATED: CPT

## 2022-12-07 PROCEDURE — 97116 GAIT TRAINING THERAPY: CPT

## 2022-12-07 PROCEDURE — 78452 HT MUSCLE IMAGE SPECT MULT: CPT

## 2022-12-07 PROCEDURE — 36415 COLL VENOUS BLD VENIPUNCTURE: CPT

## 2022-12-07 PROCEDURE — 3430000000 HC RX DIAGNOSTIC RADIOPHARMACEUTICAL: Performed by: HOSPITALIST

## 2022-12-07 PROCEDURE — 85610 PROTHROMBIN TIME: CPT

## 2022-12-07 PROCEDURE — 80061 LIPID PANEL: CPT

## 2022-12-07 PROCEDURE — 83036 HEMOGLOBIN GLYCOSYLATED A1C: CPT

## 2022-12-07 PROCEDURE — 97530 THERAPEUTIC ACTIVITIES: CPT

## 2022-12-07 PROCEDURE — 6370000000 HC RX 637 (ALT 250 FOR IP): Performed by: HOSPITALIST

## 2022-12-07 PROCEDURE — 3430000000 HC RX DIAGNOSTIC RADIOPHARMACEUTICAL

## 2022-12-07 RX ORDER — WARFARIN SODIUM 5 MG/1
5 TABLET ORAL
Status: COMPLETED | OUTPATIENT
Start: 2022-12-07 | End: 2022-12-07

## 2022-12-07 RX ORDER — CIPROFLOXACIN 2 MG/ML
400 INJECTION, SOLUTION INTRAVENOUS EVERY 12 HOURS
Status: DISCONTINUED | OUTPATIENT
Start: 2022-12-07 | End: 2022-12-08 | Stop reason: HOSPADM

## 2022-12-07 RX ADMIN — ACETAMINOPHEN 650 MG: 325 TABLET, FILM COATED ORAL at 22:12

## 2022-12-07 RX ADMIN — ROPINIROLE HYDROCHLORIDE 1 MG: 1 TABLET, FILM COATED ORAL at 18:39

## 2022-12-07 RX ADMIN — ROPINIROLE HYDROCHLORIDE 1 MG: 1 TABLET, FILM COATED ORAL at 22:13

## 2022-12-07 RX ADMIN — ROPINIROLE HYDROCHLORIDE 1 MG: 1 TABLET, FILM COATED ORAL at 16:03

## 2022-12-07 RX ADMIN — ATORVASTATIN CALCIUM 10 MG: 10 TABLET, FILM COATED ORAL at 20:34

## 2022-12-07 RX ADMIN — TETROFOSMIN 10 MILLICURIE: 1.38 INJECTION, POWDER, LYOPHILIZED, FOR SOLUTION INTRAVENOUS at 11:58

## 2022-12-07 RX ADMIN — ROPINIROLE HYDROCHLORIDE 1 MG: 1 TABLET, FILM COATED ORAL at 10:54

## 2022-12-07 RX ADMIN — REGADENOSON 0.4 MG: 0.08 INJECTION, SOLUTION INTRAVENOUS at 14:10

## 2022-12-07 RX ADMIN — METOPROLOL TARTRATE 25 MG: 25 TABLET, FILM COATED ORAL at 08:50

## 2022-12-07 RX ADMIN — CIPROFLOXACIN 400 MG: 2 INJECTION, SOLUTION INTRAVENOUS at 11:14

## 2022-12-07 RX ADMIN — FUROSEMIDE 20 MG: 20 TABLET ORAL at 18:38

## 2022-12-07 RX ADMIN — PANTOPRAZOLE SODIUM 40 MG: 40 TABLET, DELAYED RELEASE ORAL at 06:11

## 2022-12-07 RX ADMIN — ATORVASTATIN CALCIUM 20 MG: 20 TABLET, FILM COATED ORAL at 20:34

## 2022-12-07 RX ADMIN — FUROSEMIDE 40 MG: 40 TABLET ORAL at 08:50

## 2022-12-07 RX ADMIN — LISINOPRIL 10 MG: 10 TABLET ORAL at 08:50

## 2022-12-07 RX ADMIN — METOPROLOL TARTRATE 25 MG: 25 TABLET, FILM COATED ORAL at 20:33

## 2022-12-07 RX ADMIN — FAMOTIDINE 20 MG: 20 TABLET, FILM COATED ORAL at 20:34

## 2022-12-07 RX ADMIN — ATENOLOL 50 MG: 50 TABLET ORAL at 08:49

## 2022-12-07 RX ADMIN — FAMOTIDINE 20 MG: 20 TABLET, FILM COATED ORAL at 08:50

## 2022-12-07 RX ADMIN — HYDROCODONE BITARTRATE AND ACETAMINOPHEN 1 TABLET: 5; 325 TABLET ORAL at 20:34

## 2022-12-07 RX ADMIN — CIPROFLOXACIN 400 MG: 2 INJECTION, SOLUTION INTRAVENOUS at 22:13

## 2022-12-07 RX ADMIN — ASPIRIN 81 MG: 81 TABLET, CHEWABLE ORAL at 08:50

## 2022-12-07 RX ADMIN — SODIUM CHLORIDE, PRESERVATIVE FREE 10 ML: 5 INJECTION INTRAVENOUS at 08:51

## 2022-12-07 RX ADMIN — TETROFOSMIN 30 MILLICURIE: 1.38 INJECTION, POWDER, LYOPHILIZED, FOR SOLUTION INTRAVENOUS at 14:19

## 2022-12-07 RX ADMIN — ROPINIROLE HYDROCHLORIDE 1 MG: 1 TABLET, FILM COATED ORAL at 06:11

## 2022-12-07 RX ADMIN — DULOXETINE HYDROCHLORIDE 20 MG: 20 CAPSULE, DELAYED RELEASE ORAL at 08:49

## 2022-12-07 RX ADMIN — ACETAMINOPHEN 650 MG: 325 TABLET, FILM COATED ORAL at 11:18

## 2022-12-07 RX ADMIN — POTASSIUM CHLORIDE 20 MEQ: 750 TABLET, FILM COATED, EXTENDED RELEASE ORAL at 08:50

## 2022-12-07 RX ADMIN — WARFARIN SODIUM 5 MG: 5 TABLET ORAL at 18:38

## 2022-12-07 ASSESSMENT — PAIN DESCRIPTION - LOCATION: LOCATION: KNEE;BACK

## 2022-12-07 ASSESSMENT — PAIN DESCRIPTION - ORIENTATION: ORIENTATION: RIGHT;LEFT

## 2022-12-07 ASSESSMENT — PAIN SCALES - GENERAL: PAINLEVEL_OUTOF10: 3

## 2022-12-07 NOTE — PROGRESS NOTES
Physician Progress Note      PATIENTScheryl Postal  CSN #:                  [id number]  :                       1940  ADMIT DATE:       2022 10:26 AM  DISCH DATE:  RESPONDING  PROVIDER #:        Venus Sullivan MD          QUERY TEXT:    Patient admitted with CP, noted to have  atrial fibrillation and is maintained   on Coumadin. If possible, please document in progress notes and discharge   summary if you are evaluating and/or treating any of the following: The medical record reflects the following:  Risk Factors: A fib  Clinical Indicators: per H&P \"Atrial fibrillation Heart rate is controlled    Chronic anticoagulation\"  Treatment: Coumadin, Lopressor and telemetry monitoring  Options provided:  -- Secondary hypercoagulable state in a patient with atrial fibrillation  -- Other - I will add my own diagnosis  -- Disagree - Not applicable / Not valid  -- Disagree - Clinically unable to determine / Unknown  -- Refer to Clinical Documentation Reviewer    PROVIDER RESPONSE TEXT:    This patient has secondary hypercoagulable state in a patient with atrial   fibrillation.     Query created by: Alejandra iKng on 2022 12:46 PM      Electronically signed by:  Venus Sullivan MD 2022 12:51 PM

## 2022-12-07 NOTE — PROGRESS NOTES
Physical Therapy  Facility/Department: [address] MED SURG  Physical Therapy Initial Assessment  If patient discharges prior to next session this note will serve as a discharge summary. Please see below for the latest assessment towards goals. Name: Carlyn Salcido  : 1940  MRN: [id number]  Date of Service: 2022    Discharge Recommendations:  Continue to assess pending progress, Home with assist PRN, Home with Home health PT, Patient would benefit from continued therapy after discharge   Carlyn Salcido scored a 20/24 on the AM-PAC short mobility form. Current research shows that an AM-PAC score of 18 or greater is typically associated with a discharge to the patient's home setting. Based on the patient's AM-PAC score and their current functional mobility deficits, it is recommended that the patient have 2-3 sessions per week of Physical Therapy at d/c to increase the patient's independence. At this time, this patient demonstrates the endurance and safety to discharge home with prn assist and home PT (home vs OP services) and a follow up treatment frequency of 2-3x/wk. Please see assessment section for further patient specific details. PT Equipment Recommendations  Equipment Needed: No  Other: BUT will monitor      Patient Diagnosis(es): The primary encounter diagnosis was Chest pain, unspecified type. Diagnoses of History of coronary artery disease and Syncope and collapse were also pertinent to this visit. Past Medical History:  has a past medical history of A-fib ([address] Acid reflux, Arthritis, Atrial fibrillation ([address] CAD (coronary artery disease), Diarrhea, Hyperlipidemia, Hypertension, Restless leg, Stomach ulcer, and TIA (transient ischemic attack). Past Surgical History:  has a past surgical history that includes Cholecystectomy; lipoma resection; Coronary angioplasty with stent (); Colonoscopy; and Dilation and curettage of uterus (N/A, 2019).     Assessment   Assessment: The pt is an 80 yo female who presented to the ED with sudden onset of substernal chest pain and an episode of syncope 1 week ago. CXR and CT negative for abnormality. The pt lives alone and PTA, she was indep in mobility mostly w/o a device; indep in self-care and still drives. PMHx: a-fib, arth, a-fib, CAD, diarrhea, HTN, TIA, stomach ulcer, cardiac stent, lipoma resection L shoulder      Today, the pt demonstrated that she is most likely functioning close to her baseline. The assessment was cut short by the pt going to a test but she was able to walk a little in the room setting with a walker but did not appear to need that level of assist. Antiicpate that the pt will be safe for home with potentially home PT but will need to con't to follow with the pt to determine further needs. Therapy Prognosis: Good  Decision Making: Low Complexity  Barriers to Learning: none  Requires PT Follow-Up: Yes  Activity Tolerance  Activity Tolerance: Patient tolerated evaluation without incident     Plan   Physcial Therapy Plan  General Plan: 2-3 times per week  Current Treatment Recommendations: Strengthening, Balance training, Functional mobility training, Transfer training, Gait training, Stair training, Therapeutic activities, Safety education & training, Patient/Caregiver education & training, Equipment evaluation, education, & procurement  Safety Devices  Type of Devices:  (the pt left the room on a stretcher for stress test, RN aware)     Restrictions  Restrictions/Precautions  Restrictions/Precautions: Fall Risk, Bed Alarm, Up as Tolerated     Subjective   General  Chart Reviewed: Yes  Additional Pertinent Hx: Per Sobia Bran MD H&P on 12-6-2022: The pt is an 80 yo female who presented to the ED with sudden onset of substernal chest pain and an episode of syncope 1 week ago. CXR and CT negative for abnormality.     PMHx: a-fib, arth, a-fib, CAD, diarrhea, HTN, TIA, stomach ulcer, cardiac stent, lipoma resection L shoulder  Response To Previous Treatment: Not applicable  Family / Caregiver Present: No  Referring Practitioner: Court Harding MD  Referral Date : 12/07/22  Diagnosis: Atypical chest pain, UTI, a-fib  Follows Commands: Within Functional Limits  Subjective  Subjective: the pt was found to be awake in the bed; waiting to go to stress test; denied pain today         Social/Functional History  Social/Functional History  Lives With: Alone  Type of Home: House  Home Layout: One level  Home Access: Stairs to enter with rails, Ramped entrance  Entrance Stairs - Number of Steps: 2, ramp from back entrance. Pt typically enters through back entrance  Bathroom Shower/Tub: Walk-in shower  Bathroom Toilet: Handicap height  Bathroom Equipment: Shower chair, Grab bars in shower, Toilet raiser, Grab bars around toilet  Bathroom Accessibility: Walker accessible  Home Equipment: Duane Sciara, [place], [place] Sciara, 4 wheeled  ADL Assistance: [address] [place] [address] Avenue: Independent ( every 2 weeks, donovan click-list for groceries. Pt independent laundry and cooking)  Ambulation Assistance: Independent (using walker prn)  Transfer Assistance: Independent  Active : Yes  Occupation: Retired  Vision/Hearing  Vision  Vision: Within Functional Limits (for assessment)  Hearing  Hearing: Within functional limits    Cognition   Orientation  Overall Orientation Status: Within Functional Limits  Orientation Level: Oriented to person;Oriented to time;Oriented to situation;Oriented to place  Cognition  Overall Cognitive Status: WFL     Objective           Gross Assessment  AROM: Within functional limits  Strength:  Within functional limits  Sensation: Intact     Bed mobility  Supine to Sit: Stand by assistance (HOB slightly elevated)  Sit to Supine: Contact guard assistance (for the L leg onto the stretcher)  Transfers  Sit to Stand: Stand by assistance  Stand to Sit: Stand by assistance  Ambulation  Surface: Level tile  Device: [address]: Stand by assistance  Quality of Gait: the pt used the walker but in turning picked it up and then when approaching the stretcher she left the walker to the side therefore do not believe she needs the walker; she reports she nirav uses the walker at home occassionally  Distance: 15 feet x 1 in the room  More Ambulation?: No  Stairs/Curb  Stairs?: No     Balance  Sitting - Static: Good  Sitting - Dynamic: Good  Standing - Static: Good  Standing - Dynamic: Good;-                  AM-PAC Score  AM-PAC Inpatient Mobility Raw Score : 20 (12/07/22 1330)  AM-PAC Inpatient T-Scale Score : 47.67 (12/07/22 1330)  Mobility Inpatient CMS 0-100% Score: 35.83 (12/07/22 1330)  Mobility Inpatient CMS G-Code Modifier : CJ (12/07/22 1330)            Goals  Short Term Goals  Time Frame for Short Term Goals: upon d/c  Short Term Goal 1: Bed moblity with mod I. Short Term Goal 2: Transfers sit <> stand with mod I. Short Term Goal 3: Ambulate with/without a device 100 feet with SBA/Sup. Short Term Goal 4: Negotiate steps with CGA.   Patient Goals   Patient Goals : none stated       Education  Patient Education  Education Given To: Patient  Education Provided: Role of Therapy;Plan of Care  Education Method: Verbal  Barriers to Learning: None  Education Outcome: Demonstrated understanding;Verbalized understanding      Therapy Time   Individual Concurrent Group Co-treatment   Time In 1300         Time Out 1325         Minutes 25         Timed Code Treatment Minutes: 10 Minutes       Electronically signed by Gianna Chappell, PT [id number] on 12/7/2022 at 1:38 PM

## 2022-12-07 NOTE — H&P
Hospitalist  History and Physical    Patient:  Lily Paul  MRN: [id number]  PCP: Kari Barksdale    CHIEF COMPLAINT:  chest pain      HISTORY OF PRESENT ILLNESS:   The patient Lily Paul is a 81 y. o.female with medical history significant for atrial fibrillation, coronary artery disease and stent placement, hypertension hyperlipidemia peptic ulcer disease. Patient presented to the emergency room after she had an episode of substernal sudden onset of pain. Patient denies any fever chills no complaint of shortness of breath. Patient does report an episode of  syncope 1 week ago where she Lost consciousness momentarily. At the time of examination patient denied any complaints. Past Medical History:        Diagnosis Date    A-fib ([facility] [address]     Acid reflux     Arthritis     Atrial fibrillation (HCC)     CAD (coronary artery disease)     Diarrhea     Hyperlipidemia     Hypertension     Restless leg     Stomach ulcer     TIA (transient ischemic attack) 1995    NO RESIDUAL       Past Surgical History:        Procedure Laterality Date    CHOLECYSTECTOMY      COLONOSCOPY      CORONARY ANGIOPLASTY WITH STENT PLACEMENT  2014    X4    DILATION AND CURETTAGE OF UTERUS N/A 4/2/2019    HYSTEROSCOPY DILATION AND CURETTAGE POLYP REMOVAL performed by Andrae Hinojosa MD at [address] [place] [address]       Medications Prior to Admission:    Prior to Admission medications    Medication Sig Start Date End Date Taking?  Authorizing Provider   furosemide (LASIX) 20 MG tablet TAKE 2 TABLETS EVERY       MORNING AND 1 TABLET EVERY EVENING 8/31/22  Yes Historical Provider, MD   DULoxetine (CYMBALTA) 20 MG extended release capsule Take 20 mg by mouth daily 12/1/22   Historical Provider, MD   famotidine (PEPCID) 20 MG tablet Take 20 mg by mouth 2 times daily 9/24/22   Historical Provider, MD   aspirin 81 MG tablet Take 81 mg by mouth daily    Historical Provider, MD   atenolol (TENORMIN) 50 MG tablet Take 50 mg by mouth daily 8/10/17   Historical Provider, MD   lovastatin (MEVACOR) 40 MG tablet Take 40 mg by mouth nightly 1/10/17   Historical Provider, MD   nitroGLYCERIN (NITROSTAT) 0.4 MG SL tablet Place 0.4 mg under the tongue every 5 minutes as needed  11/12/08   Historical Provider, MD   pantoprazole (PROTONIX) 40 MG tablet Take 40 mg by mouth daily 8/15/17   Historical Provider, MD   rOPINIRole (REQUIP) 1 MG tablet Take 1 mg by mouth 5 times daily 1/10/17   Historical Provider, MD   warfarin (COUMADIN) 5 MG tablet Take by mouth every evening 5 mg on Mondays, Wednesdays, and Fridays and 2.5 mg all other days 7/25/17   Historical Provider, MD   lisinopril (PRINIVIL;ZESTRIL) 10 MG tablet Take 10 mg by mouth daily    Historical Provider, MD   potassium chloride (KLOR-CON M) 20 MEQ TBCR extended release tablet Take 20 mEq by mouth daily (with breakfast)    Historical Provider, MD   HYDROcodone-acetaminophen (NORCO) 5-325 MG per tablet Take 0.5 tablets by mouth nightly as needed for Pain. Historical Provider, MD       Allergies:  Penicillins      Social History:   TOBACCO:   reports that she has never smoked. She has never used smokeless tobacco.  ETOH:   reports current alcohol use. Family History:   History reviewed. No pertinent family history. REVIEW OF SYSTEMS:     patients reported symptoms are in BOLD all other symptoms are negative. CONSTITUTIONAL:      fatigue, fever, chills or night sweats, recent weight gain, recent wt loss, insomnia,  General weakness, poor appetite, muscle aches and pains    HEAD: headache, dizziness    EYES:      blurriness,  double vision, dryness,  discharge, irritation,diplopia    EARS:      hearing loss, vertigo, ear discharge,  Earache. Ringing in the ears. NOSE:      Rhinorrhea, sneezing, epistaxis.  Discharge, sinusitis,     MOUTH/THROAT:         sore throat, mouth ulcers, Hoarseness    RESPIRATORY:        Shortness of breath, wheezing,  cough, sputum, hemoptysis, obstructive sleep apnea,    CARDIOVASCULAR :      chest pain, palpitations, dyspnea on exercise, Lower extrimity edema (swelling),     GASTROINTESTINAL:       Dysphagia, Poor appetite,  Nausea, Vomiting, diarrhea, heartburn, abdominal pain. Blood in the stools, hematemesis. Pain with swallowing, constipation    GENITOURINARY:       Urinary frequency, hesitancy,  urgency, Dysuria, hematuria,  Urinary Incontinence. Urinary Retention. GYNECOLOGICAL: vaginal bleeding , vaginal discharge, menopause    MUSCULOSKELETAL:       joint swelling or stiffness, joint pain, muscle pain, balance problems, low back pain. NEUROLOGICAL:      Gait problems. Tremor. Dizziness. Pain and paresthesias, weakness in extremities. Seizures, memory loss    PSYCHLOGICAL:        Anxiety, depression    SKIN :      Rashes ulcers, skin color changes, easy bruisability, lymphadenopathy      Physical Exam:      Vitals: /73   Pulse 72   Temp 98.1 °F (36.7 °C) (Oral)   Resp 18   Ht 5' 7\" (1.702 m)   Wt 181 lb 10.5 oz (82.4 kg)   SpO2 94%   BMI 28.45 kg/m²     Gen:          Alert and oriented x 3  Eyes: PERRL. No sclera icterus. No conjunctival injection. ENT: No discharge. Pharynx clear. External appearance of ears and nose normal.  Neck: Trachea midline. No obvious mass. Resp: No accessory muscle use. No crackles. No wheezes. No rhonchi. CV: Regular rate. Regular rhythm. No murmur or rub. No edema. GI: Non-tender. Non-distended. No hernia. Skin: Warm, dry, normal texture and turgor. Lymph: No cervical LAD. No supraclavicular LAD. M/S: / Ext. No cyanosis. No clubbing. No joint deformity. Neuro: Moves all four extremities. CN 2-12 tested, no deficits noted. Peripheral pulses and capillary refill is intact.       CBC:   Recent Labs     12/06/22  1051   WBC 6.1   HGB 11.5*        BMP:    Recent Labs     12/06/22  1051   *   K 4.0   CL 99   CO2 29   BUN 15   CREATININE <0.5*   GLUCOSE 138*     Hepatic: No results for input(s): AST, ALT, ALB, BILITOT, ALKPHOS in the last 72 hours. Troponin:   Recent Labs     12/06/22  1051 12/06/22  1343 12/06/22  1752   TROPONINI <0.01 <0.01 <0.01     BNP: No results for input(s): BNP in the last 72 hours. INR:   Recent Labs     12/06/22  1051   INR 1.66*     No results found for: LABA1C        No results for input(s): CKTOTAL in the last 72 hours. -----------------------------------------------------------------    XR CHEST PORTABLE   Stable exam.  No acute finding. CT Head W/O Contrast  No acute intracranial abnormality    Ekg  Normal sinus rhythmNonspecific T wave abnormality    Assessment / Plan     Chest pain, unspecified R07.9  Atypical chest pain  Admit patient to telemetry  Trend cardiac enzymes  If patient rules out for MI   Cardiac stress testing in a.m. Start patient on PPI  Continue patient on aspirin, beta blocker and statin    Pain management  R52  Continue with the IV and oral pain medication      Urinary tract infection  Start patient on ciprofloxacin      Atrial fibrillation I48.91  Heart rate is controlled  Chronic anticoagulation  Pharmacy to manage Coumadin      DVT and GI prophylaxis      Full Code      Tevin Pelletier MD M.D    This note was transcribed using [id number] [employer]. Please disregard any translational errors.

## 2022-12-07 NOTE — PROGRESS NOTES
4 Eyes Skin Assessment     NAME:  Lulu Hyatt  YOB: 1940  MEDICAL RECORD NUMBER:  [id number]    The patient is being assessed for  Admission    I agree that One RN have performed a thorough Head to Toe Skin Assessment on the patient. ALL assessment sites listed below have been assessed. Areas assessed by both nurses:    Head, Face, Ears, Shoulders, Back, Chest, Arms, Elbows, Hands, Sacrum. Buttock, Coccyx, Ischium, and Legs. Feet and Heels        Does the Patient have a Wound?  No noted wound(s)       Jorge L Prevention initiated by RN: Yes   Wound Care Orders initiated by RN: No    Pressure Injury (Stage 3,4, Unstageable, DTI, NWPT, and Complex wounds) if present place referral order by RN under : NA    New and Established Ostomies, if present place, referral order under : NA      Nurse 1 eSignature: Electronically signed by Peter Moser RN on 12/6/22 at 7:14 PM EST    **SHARE this note so that the co-signing nurse is able to place an eSignature**    Nurse 2 eSignature: Electronically signed by Rosemarie Castorena RN on 12/6/22 at 7:17 PM EST

## 2022-12-07 NOTE — PROGRESS NOTES
Physician Progress Note      Felicita Nick  [facility] #:                  [id number]  :                       1940  ADMIT DATE:       2022 10:26 AM  DISCH DATE:  RESPONDING  PROVIDER #:        Martha Hurd MD          QUERY TEXT:    Patient admitted with CP, noted to have atrial fibrillation. If possible,   please document in progress notes and discharge summary further specificity   regarding the type of atrial fibrillation: The medical record reflects the following:  Risk Factors: A fib  Clinical Indicators: per H&P \"Atrial fibrillation Heart rate is controlled    Chronic anticoagulation\"  Treatment: Coumadin, Lopressor and Telemetry monitoring    Chronic: nonspecific term that could be referring to paroxysmal, persistent,   or permanent  Longstanding persistent: persistent and continuous, lasting > 1 year. Paroxysmal - self-terminating or intermittent; resolves with or without   intervention within 7 days of onset; may recur with various frequency. Persistent - Fails to terminate within 7 days; Often requires meds or   cardioversion to restore to NSR. Permanent - longstanding & persistent; Medication has been ineffective in   restoring NSR &/or cardioversion is contraindicated    Definitions per MS-DRG Training Guide and Quick Reference Guide, Ilan [address] 5   Diseases and Disorders of the Circulatory System; 2019; [employer]. Software content   from the [employer]? Advanced [employer] Transformation Program  Options provided:  -- Paroxysmal Atrial Fibrillation  -- Longstanding Persistent Atrial Fibrillation  -- Permanent Atrial Fibrillation  -- Persistent Atrial Fibrillation  -- Chronic Atrial Fibrillation, unspecified  -- Other - I will add my own diagnosis  -- Disagree - Not applicable / Not valid  -- Disagree - Clinically unable to determine / Unknown  -- Refer to Clinical Documentation Reviewer    PROVIDER RESPONSE TEXT:    This patient has paroxysmal atrial fibrillation.     Query created by: David Lennon on 12/7/2022 12:45 PM      Electronically signed by:  Arnulfo Chaudhari MD 12/7/2022 2:03 PM

## 2022-12-07 NOTE — PLAN OF CARE
Problem: Discharge Planning  Goal: Discharge to home or other facility with appropriate resources  12/7/2022 1000 by Rustam Yeager RN  Outcome: Progressing  12/7/2022 0010 by Tevin Mariscal RN  Outcome: Progressing     Problem: Safety - Adult  Goal: Free from fall injury  12/7/2022 1000 by Rustam Yeager RN  Outcome: Progressing  12/7/2022 0010 by Tevin Mariscal RN  Outcome: Progressing

## 2022-12-07 NOTE — PROGRESS NOTES
Hospitalist Progress Note  12/7/2022 10:29 AM    PCP: Juvenal Alejandro    [id number]     Date of Admission: 12/6/2022                                                                                                                     HOSPITAL COURSE    Patient demographics:  The patient  Jackelyn Milligan is a 81 y.o. female     Significant past medical history:   Patient Active Problem List   Diagnosis    Chest pain         Presenting symptoms:  chest pain    Diagnostic workup:      CONSULTS DURING ADMISSION :   PHARMACY TO DOSE WARFARIN      Patient was diagnosed with:  Chest pain, unspecified  Urinary tract infection  Atrial fibrillation      Treatment while inpatient:  Patient presented to the emergency room after she had an episode of substernal sudden onset of pain.                                                                                       ----------------------------------------------------------      SUBJECTIVE COMPLAINTS chest pain     Diet: ADULT DIET; Regular;  No Caffeine      OBJECTIVE:   Patient Active Problem List   Diagnosis    Chest pain       Allergies  Penicillins    Medications    Scheduled Meds:   ciprofloxacin  400 mg IntraVENous Q12H    aspirin  81 mg Oral Daily    atenolol  50 mg Oral Daily    atorvastatin  10 mg Oral QHS    pantoprazole  40 mg Oral QAM AC    potassium chloride  20 mEq Oral Daily with breakfast    sodium chloride flush  10 mL IntraVENous 2 times per day    famotidine  20 mg Oral BID    metoprolol tartrate  25 mg Oral BID    atorvastatin  20 mg Oral Nightly    enoxaparin  40 mg SubCUTAneous Daily    lisinopril  10 mg Oral Daily    rOPINIRole  1 mg Oral 5x Daily    DULoxetine  20 mg Oral Daily    furosemide  40 mg Oral Daily    And    furosemide  20 mg Oral QPM     Continuous Infusions:   sodium chloride       PRN Meds:  HYDROcodone-acetaminophen, nitroGLYCERIN, sodium chloride flush, sodium chloride, ondansetron **OR** ondansetron, acetaminophen **OR** acetaminophen, polyethylene glycol, potassium chloride **OR** potassium alternative oral replacement **OR** potassium chloride, nitroGLYCERIN    Vitals   Vitals /wt Patient Vitals for the past 8 hrs:   BP Temp Temp src Pulse Resp SpO2 Weight   12/07/22 0959 -- -- -- 72 -- -- --   12/07/22 0846 (!) 154/76 98 °F (36.7 °C) Oral 73 18 95 % --   12/07/22 0835 -- -- -- -- -- 95 % --   12/07/22 0400 118/60 98.1 °F (36.7 °C) Oral 73 18 94 % 166 lb 7.2 oz (75.5 kg)        72HR INTAKE/OUTPUT:    Intake/Output Summary (Last 24 hours) at 12/7/2022 1029  Last data filed at 12/7/2022 0400  Gross per 24 hour   Intake --   Output 900 ml   Net -900 ml       Exam:    Gen:   Alert and oriented ×3  Eyes: PERRL. No sclera icterus. No conjunctival injection. ENT: No discharge. Pharynx clear. External appearance of ears and nose normal.  Neck: Trachea midline. No obvious mass. Resp: No accessory muscle use. No crackles. No wheezes. No rhonchi. CV: Regular rate. Regular rhythm. No murmur or rub. No edema. GI: Non-tender. Non-distended. No hernia. Skin: Warm, dry, normal texture and turgor. Lymph: No cervical LAD. No supraclavicular LAD. M/S: / Ext. No cyanosis. No clubbing. No joint deformity. Neuro: CN 2-12 are intact,  no neurologic deficits noted. PT/INR:   Recent Labs     12/06/22  1051   PROTIME 19.6*   INR 1.66*     APTT: No results for input(s): APTT in the last 72 hours. CBC:   Recent Labs     12/06/22  1051 12/07/22  0433   WBC 6.1 6.1   HGB 11.5* 11.5*   HCT 34.6* 34.3*   MCV 90.7 90.0    201       BMP:   Recent Labs     12/06/22  1051 12/07/22  0433   * 141   K 4.0 3.7   CL 99 101   CO2 29 26   BUN 15 14   CREATININE <0.5* <0.5*       LIVER PROFILE: No results for input(s): ALKPHOS, AST, ALT, ALB, BILIDIR, BILITOT, ALKPHOS in the last 72 hours. No results for input(s): AMYLASE in the last 72 hours. No results for input(s): LIPASE in the last 72 hours.     UA:  Recent Labs     12/06/22  6899 WBCUA 22*   RBCUA 1       TROPONIN:   Recent Labs     12/06/22  1051 12/06/22  1343 12/06/22  1752   TROPONINI <0.01 <0.01 <0.01       Lab Results   Component Value Date/Time    URRFLXCULT Yes 12/06/2022 05:50 PM       No results for input(s): TSHREFLEX in the last 72 hours. No components found for: [id number]  POC GLUCOSE:  No results for input(s): POCGLU in the last 72 hours. Recent Labs     12/07/22  0433   LABA1C 5.6      Lab Results   Component Value Date/Time    LABA1C 5.6 12/07/2022 04:33 AM         ASSESSMENT AND PLAN  Chest pain, unspecified R07.9  Atypical chest pain  Troponin is not elevated  Cardiac stress testing  Start patient on PPI  Continue patient on aspirin, beta blocker and statin    Pain management  R52  Continue with the IV and oral pain medication      Urinary tract infection  Start patient on ciprofloxacin        Atrial fibrillation I48.91  Heart rate is controlled  Chronic anticoagulation  Pharmacy to manage Coumadin        DVT and GI prophylaxis      Code Status: Full Code        Dispo -Home in a.m. The patient and / or the family were informed of the results of any tests, a time was given to answer questions, a plan was proposed and they agreed with plan. Tim Umanzor MD    This note was transcribed using [address]. Please disregard any translational errors.

## 2022-12-07 NOTE — PROGRESS NOTES
Clinical Pharmacy Note  Warfarin Consult    Mark Yanes is a 81 y.o. female receiving warfarin managed by pharmacy. Patient being bridged with none. Warfarin Indication: a fib  Target INR range: 2-3   Dose prior to admission: 5 mg M,W,F and 2.5 mg rest of the week    Current warfarin drug-drug interactions: Cipro     Recent Labs     12/06/22  1051 12/07/22  0433 12/07/22  1054   HGB 11.5* 11.5*  --    HCT 34.6* 34.3*  --    INR 1.66*  --  1.71*       Assessment/Plan:    Warfarin 5 mg tonight. Pt did not receive dose last night. Daily PT/INR until stable within therapeutic range. Thank you for the consult. Will continue to follow.    Goldie Naranjo, [facility]

## 2022-12-08 VITALS
RESPIRATION RATE: 18 BRPM | SYSTOLIC BLOOD PRESSURE: 126 MMHG | HEART RATE: 67 BPM | DIASTOLIC BLOOD PRESSURE: 75 MMHG | HEIGHT: 67 IN | BODY MASS INDEX: 25.99 KG/M2 | OXYGEN SATURATION: 95 % | TEMPERATURE: 97.6 F | WEIGHT: 165.57 LBS

## 2022-12-08 LAB
INR BLD: 1.49 (ref 0.87–1.14)
PROTHROMBIN TIME: 18 SEC (ref 11.7–14.5)

## 2022-12-08 PROCEDURE — 6370000000 HC RX 637 (ALT 250 FOR IP): Performed by: HOSPITALIST

## 2022-12-08 PROCEDURE — 94760 N-INVAS EAR/PLS OXIMETRY 1: CPT

## 2022-12-08 PROCEDURE — 36415 COLL VENOUS BLD VENIPUNCTURE: CPT

## 2022-12-08 PROCEDURE — 85610 PROTHROMBIN TIME: CPT

## 2022-12-08 PROCEDURE — 2500000003 HC RX 250 WO HCPCS: Performed by: HOSPITALIST

## 2022-12-08 PROCEDURE — 6360000002 HC RX W HCPCS: Performed by: HOSPITALIST

## 2022-12-08 RX ORDER — WARFARIN SODIUM 5 MG/1
5 TABLET ORAL
Status: DISCONTINUED | OUTPATIENT
Start: 2022-12-08 | End: 2022-12-08 | Stop reason: HOSPADM

## 2022-12-08 RX ORDER — CIPROFLOXACIN 500 MG/1
500 TABLET, FILM COATED ORAL 2 TIMES DAILY
Qty: 6 TABLET | Refills: 0 | Status: SHIPPED | OUTPATIENT
Start: 2022-12-08 | End: 2022-12-11

## 2022-12-08 RX ADMIN — ASPIRIN 81 MG: 81 TABLET, CHEWABLE ORAL at 07:59

## 2022-12-08 RX ADMIN — POTASSIUM CHLORIDE 20 MEQ: 750 TABLET, FILM COATED, EXTENDED RELEASE ORAL at 08:04

## 2022-12-08 RX ADMIN — BNT162B2 ORIGINAL AND OMICRON BA.4/BA.5 0.3 ML: .1125; .1125 INJECTION, SUSPENSION INTRAMUSCULAR at 12:25

## 2022-12-08 RX ADMIN — FUROSEMIDE 40 MG: 40 TABLET ORAL at 07:59

## 2022-12-08 RX ADMIN — LISINOPRIL 10 MG: 10 TABLET ORAL at 08:01

## 2022-12-08 RX ADMIN — ATENOLOL 50 MG: 50 TABLET ORAL at 07:59

## 2022-12-08 RX ADMIN — METOPROLOL TARTRATE 25 MG: 25 TABLET, FILM COATED ORAL at 07:59

## 2022-12-08 RX ADMIN — ROPINIROLE HYDROCHLORIDE 1 MG: 1 TABLET, FILM COATED ORAL at 05:33

## 2022-12-08 RX ADMIN — PANTOPRAZOLE SODIUM 40 MG: 40 TABLET, DELAYED RELEASE ORAL at 05:33

## 2022-12-08 RX ADMIN — CIPROFLOXACIN 400 MG: 2 INJECTION, SOLUTION INTRAVENOUS at 10:23

## 2022-12-08 RX ADMIN — ENOXAPARIN SODIUM 40 MG: 100 INJECTION SUBCUTANEOUS at 08:02

## 2022-12-08 RX ADMIN — FAMOTIDINE 20 MG: 20 TABLET, FILM COATED ORAL at 07:59

## 2022-12-08 RX ADMIN — DULOXETINE HYDROCHLORIDE 20 MG: 20 CAPSULE, DELAYED RELEASE ORAL at 07:59

## 2022-12-08 RX ADMIN — ROPINIROLE HYDROCHLORIDE 1 MG: 1 TABLET, FILM COATED ORAL at 10:22

## 2022-12-08 NOTE — PROGRESS NOTES
Patient is now ready for d/c, patients IV was removed without complications. Discharge information discussed with the daughter (with permission) and the patient who is A+Ox4. All questions answered. Daughter to transport home, will d/c.      Electronically signed by Ladan Toth RN on 12/8/2022 at 12:44 PM

## 2022-12-08 NOTE — PROGRESS NOTES
Clinical Pharmacy Note  Warfarin Consult    Milagros Florez is a 81 y.o. female receiving warfarin managed by pharmacy. Patient being bridged with none. Warfarin Indication: a fib  Target INR range: 2-3   Dose prior to admission: 5 mg M,W,F and 2.5 mg rest of the week    Current warfarin drug-drug interactions: Cipro     Recent Labs     12/06/22  1051 12/07/22  0433 12/07/22  1054 12/08/22  0532   HGB 11.5* 11.5*  --   --    HCT 34.6* 34.3*  --   --    INR 1.66*  --  1.71* 1.49*         Assessment/Plan:    Warfarin 5 mg again tonight. Pt did not receive dose on 12/6. Daily PT/INR until stable within therapeutic range. Thank you for the consult. Will continue to follow.    Serge Elizabeth [facility]

## 2022-12-08 NOTE — PLAN OF CARE
Problem: Discharge Planning  Goal: Discharge to home or other facility with appropriate resources  12/8/2022 0954 by Yojana Gibbons RN  Outcome: Progressing     Problem: Safety - Adult  Goal: Free from fall injury  12/8/2022 0954 by Yojana Gibbons RN  Outcome: Progressing

## 2022-12-09 NOTE — DISCHARGE SUMMARY
Hospital Medicine Discharge Summary      Patient ID: Dominic Ascencio [id number]     Patient's PCP: Alessandro Anderson    Admit Date: 12/6/2022     Discharge Date: 12/8/2022      Admitting Physician: Robert Fernando MD    Discharge Physician: Tarun Bishop MD     Discharge Diagnoses: Active Hospital Problems    Diagnosis Date Noted    Chest pain [R07.9] 12/06/2022     Priority: Medium         The patient was seen and examined on the day of discharge and this discharge summary is in conjunction with any daily progress note from day of discharge. HOSPITAL COURSE  Patient demographics:  The patient  Janet Alonzo is a 81 y.o. female      Significant past medical history:       Patient Active Problem List   Diagnosis    Chest pain            Presenting symptoms:  chest pain     Diagnostic workup:   CT Head W/O Contrast       CONSULTS DURING ADMISSION :   PHARMACY TO DOSE WARFARIN        Patient was diagnosed with:  Chest pain, unspecified  Urinary tract infection  Atrial fibrillation        Treatment while inpatient:  Patient presented to the emergency room after she had an episode of substernal sudden onset of pain. Patient was ruled out for myocardial infarction with EKG and enzyme criteria. Patient also underwent nuclear medicine cardiac stress testing that was a low probability study. Patient's chest pain improved. Patient is to follow-up with her primary cardiologist                      Patient was also treated for urinary tract infection and will be discharged on 3 days of ciprofloxacin treatment. Discharge Condition:  stable     Discharged to:  Home     Activity:   as tolerated: Follow Up: Follow-up with PCP in 1-2 weeks             Labs:  For convenience and continuity at follow-up the following most recent labs are provided:      CBC:   Lab Results   Component Value Date/Time    WBC 6.1 12/07/2022 04:33 AM    HGB 11.5 12/07/2022 04:33 AM    HCT 34.3 12/07/2022 04:33 AM  12/07/2022 04:33 AM       RENAL:   Lab Results   Component Value Date/Time     12/07/2022 04:33 AM    K 3.7 12/07/2022 04:33 AM     12/07/2022 04:33 AM    CO2 26 12/07/2022 04:33 AM    BUN 14 12/07/2022 04:33 AM    CREATININE <0.5 12/07/2022 04:33 AM           Discharge Medications:      Medication List        START taking these medications      ciprofloxacin 500 MG tablet  Commonly known as: CIPRO  Take 1 tablet by mouth 2 times daily for 3 days            CONTINUE taking these medications      aspirin 81 MG tablet     atenolol 50 MG tablet  Commonly known as: TENORMIN     DULoxetine 20 MG extended release capsule  Commonly known as: CYMBALTA     famotidine 20 MG tablet  Commonly known as: PEPCID     furosemide 20 MG tablet  Commonly known as: LASIX     HYDROcodone-acetaminophen 5-325 MG per tablet  Commonly known as: NORCO     lisinopril 10 MG tablet  Commonly known as: PRINIVIL;ZESTRIL     lovastatin 40 MG tablet  Commonly known as: MEVACOR     nitroGLYCERIN 0.4 MG SL tablet  Commonly known as: NITROSTAT     pantoprazole 40 MG tablet  Commonly known as: PROTONIX     potassium chloride 20 MEQ Tbcr extended release tablet  Commonly known as: KLOR-CON M     rOPINIRole 1 MG tablet  Commonly known as: REQUIP     warfarin 5 MG tablet  Commonly known as: COUMADIN               Where to Get Your Medications        These medications were sent to [address] , [facility] [address]      Phone: [phone]   ciprofloxacin 500 MG tablet            Time Spent on discharge is more than 30 min in the examination, evaluation, counseling and review of medications and discharge plan. Signed:  Brina Chino MD   12/8/2022      Thank you Louie Ashton for the opportunity to be involved in this patient's care.  If you have any questions or concerns please feel free to contact me at [phone]. This note was transcribed using [id number] [employer]. Please disregard any translational errors.

## 2022-12-11 ENCOUNTER — APPOINTMENT (OUTPATIENT)
Dept: CT IMAGING | Age: 82
End: 2022-12-11
Payer: MEDICARE

## 2022-12-11 ENCOUNTER — HOSPITAL ENCOUNTER (EMERGENCY)
Age: 82
Discharge: HOME | End: 2022-12-11
Payer: MEDICARE

## 2022-12-11 VITALS
TEMPERATURE: 97.4 F | HEIGHT: 67 IN | DIASTOLIC BLOOD PRESSURE: 84 MMHG | WEIGHT: 167.55 LBS | RESPIRATION RATE: 18 BRPM | OXYGEN SATURATION: 95 % | SYSTOLIC BLOOD PRESSURE: 161 MMHG | BODY MASS INDEX: 26.3 KG/M2 | HEART RATE: 67 BPM

## 2022-12-11 DIAGNOSIS — S22.080A CLOSED WEDGE COMPRESSION FRACTURE OF T12 VERTEBRA, INITIAL ENCOUNTER (HCC): ICD-10-CM

## 2022-12-11 DIAGNOSIS — R10.31 ACUTE ABDOMINAL PAIN IN RIGHT LOWER QUADRANT: Primary | ICD-10-CM

## 2022-12-11 DIAGNOSIS — K59.00 ACUTE CONSTIPATION: ICD-10-CM

## 2022-12-11 DIAGNOSIS — S32.010A CLOSED WEDGE COMPRESSION FRACTURE OF L1 VERTEBRA, INITIAL ENCOUNTER (HCC): ICD-10-CM

## 2022-12-11 LAB
A/G RATIO: 1.1 (ref 1.1–2.2)
ALBUMIN SERPL-MCNC: 4 G/DL (ref 3.4–5)
ALP BLD-CCNC: 77 U/L (ref 40–129)
ALT SERPL-CCNC: 14 U/L (ref 10–40)
ANION GAP SERPL CALCULATED.3IONS-SCNC: 10 MMOL/L (ref 3–16)
AST SERPL-CCNC: 21 U/L (ref 15–37)
BACTERIA: NORMAL /HPF
BASOPHILS ABSOLUTE: 0 K/UL (ref 0–0.2)
BASOPHILS RELATIVE PERCENT: 0.6 %
BILIRUB SERPL-MCNC: 0.5 MG/DL (ref 0–1)
BILIRUBIN URINE: NEGATIVE
BLOOD, URINE: NEGATIVE
BUN BLDV-MCNC: 10 MG/DL (ref 7–20)
CALCIUM SERPL-MCNC: 9.3 MG/DL (ref 8.3–10.6)
CHLORIDE BLD-SCNC: 98 MMOL/L (ref 99–110)
CLARITY: CLEAR
CO2: 28 MMOL/L (ref 21–32)
COLOR: YELLOW
CREAT SERPL-MCNC: <0.5 MG/DL (ref 0.6–1.2)
EOSINOPHILS ABSOLUTE: 0.4 K/UL (ref 0–0.6)
EOSINOPHILS RELATIVE PERCENT: 7.7 %
EPITHELIAL CELLS, UA: 1 /HPF (ref 0–5)
GFR SERPL CREATININE-BSD FRML MDRD: >60 ML/MIN/{1.73_M2}
GLUCOSE BLD-MCNC: 114 MG/DL (ref 70–99)
GLUCOSE URINE: NEGATIVE MG/DL
HCT VFR BLD CALC: 38.1 % (ref 36–48)
HEMOGLOBIN: 12.6 G/DL (ref 12–16)
HYALINE CASTS: 1 /LPF (ref 0–8)
KETONES, URINE: NEGATIVE MG/DL
LEUKOCYTE ESTERASE, URINE: ABNORMAL
LIPASE: 18 U/L (ref 13–60)
LYMPHOCYTES ABSOLUTE: 1.1 K/UL (ref 1–5.1)
LYMPHOCYTES RELATIVE PERCENT: 20.6 %
MCH RBC QN AUTO: 29.9 PG (ref 26–34)
MCHC RBC AUTO-ENTMCNC: 33.2 G/DL (ref 31–36)
MCV RBC AUTO: 90 FL (ref 80–100)
MICROSCOPIC EXAMINATION: YES
MONOCYTES ABSOLUTE: 0.7 K/UL (ref 0–1.3)
MONOCYTES RELATIVE PERCENT: 12.2 %
NEUTROPHILS ABSOLUTE: 3.2 K/UL (ref 1.7–7.7)
NEUTROPHILS RELATIVE PERCENT: 58.9 %
NITRITE, URINE: NEGATIVE
PDW BLD-RTO: 13.3 % (ref 12.4–15.4)
PH UA: 5 (ref 5–8)
PLATELET # BLD: 235 K/UL (ref 135–450)
PMV BLD AUTO: 7.6 FL (ref 5–10.5)
POTASSIUM REFLEX MAGNESIUM: 4 MMOL/L (ref 3.5–5.1)
PROTEIN UA: NEGATIVE MG/DL
RBC # BLD: 4.23 M/UL (ref 4–5.2)
RBC UA: 0 /HPF (ref 0–4)
SODIUM BLD-SCNC: 136 MMOL/L (ref 136–145)
SPECIFIC GRAVITY UA: 1.01 (ref 1–1.03)
TOTAL PROTEIN: 7.5 G/DL (ref 6.4–8.2)
URINE REFLEX TO CULTURE: ABNORMAL
URINE TYPE: ABNORMAL
UROBILINOGEN, URINE: 0.2 E.U./DL
WBC # BLD: 5.4 K/UL (ref 4–11)
WBC UA: 2 /HPF (ref 0–5)

## 2022-12-11 PROCEDURE — 81001 URINALYSIS AUTO W/SCOPE: CPT

## 2022-12-11 PROCEDURE — 80053 COMPREHEN METABOLIC PANEL: CPT

## 2022-12-11 PROCEDURE — 99285 EMERGENCY DEPT VISIT HI MDM: CPT

## 2022-12-11 PROCEDURE — 83690 ASSAY OF LIPASE: CPT

## 2022-12-11 PROCEDURE — 6360000004 HC RX CONTRAST MEDICATION: Performed by: PHYSICIAN ASSISTANT

## 2022-12-11 PROCEDURE — 74177 CT ABD & PELVIS W/CONTRAST: CPT

## 2022-12-11 PROCEDURE — 85025 COMPLETE CBC W/AUTO DIFF WBC: CPT

## 2022-12-11 RX ORDER — POLYETHYLENE GLYCOL 3350 17 G/17G
17 POWDER, FOR SOLUTION ORAL DAILY PRN
Qty: 170 G | Refills: 0 | Status: SHIPPED | OUTPATIENT
Start: 2022-12-11 | End: 2022-12-21

## 2022-12-11 RX ADMIN — IOPAMIDOL 75 ML: 755 INJECTION, SOLUTION INTRAVENOUS at 11:34

## 2022-12-11 ASSESSMENT — PAIN SCALES - GENERAL
PAINLEVEL_OUTOF10: 0
PAINLEVEL_OUTOF10: 0

## 2022-12-11 ASSESSMENT — PAIN DESCRIPTION - ONSET: ONSET: AWAKENED FROM SLEEP

## 2022-12-11 ASSESSMENT — PAIN DESCRIPTION - ORIENTATION: ORIENTATION: RIGHT;LOWER

## 2022-12-11 ASSESSMENT — PAIN DESCRIPTION - LOCATION: LOCATION: ABDOMEN

## 2022-12-11 ASSESSMENT — PAIN DESCRIPTION - FREQUENCY: FREQUENCY: INTERMITTENT

## 2022-12-11 ASSESSMENT — LIFESTYLE VARIABLES: HOW OFTEN DO YOU HAVE A DRINK CONTAINING ALCOHOL: MONTHLY OR LESS

## 2022-12-11 ASSESSMENT — PAIN DESCRIPTION - DESCRIPTORS: DESCRIPTORS: SHARP

## 2022-12-11 ASSESSMENT — PAIN DESCRIPTION - PAIN TYPE: TYPE: ACUTE PAIN

## 2022-12-11 ASSESSMENT — PAIN - FUNCTIONAL ASSESSMENT: PAIN_FUNCTIONAL_ASSESSMENT: 0-10

## 2022-12-11 NOTE — ED PROVIDER NOTES
[address]  [address] [address]  Dept: [phone]  Loc: [address] ENCOUNTER        This patient was not seen or evaluated by the attending physician. I evaluated this patient, the attending physician was available for consultation. CHIEF COMPLAINT    Chief Complaint   Patient presents with    Abdominal Pain     Intermittent periods of sharp RLQ abdominal pain noticed this morning, she states her LBM was Thursday when she was admitted into the hospital for cardiac W/U. HPI    Lorrie Donohue is a 81 y.o. female who presents with abdominal pain localized in the RLQ of the abdomen. Onset was yesterday afternoon. The duration has been intermittent since the onset, lasting approximately 1 minute at a time, frequency approximately twice an hour. The patient denies any associated nausea, vomiting or diarrhea. The quality of the pain is cramping. The context is that the symptoms started spontaneously. There are no alleviating factors. At the time my exam patient is pain-free. Patient states she had a fall 2 weeks ago onto her right side and bruised her right abdomen, she states that she thinks the pain may have been related to that. She also states that she has not had a bowel movement since Thursday but that she does not usually have a bowel movement every day. Patient was admitted last week for cardiac work-up after presenting to the ED with chest pain. Patient states that she has had no chest pain since last Thursday. She had a negative stress test as an inpatient and had been discharged home. REVIEW OF SYSTEMS    GI: see HPI, no vomiting, no diarrhea, no hematochezia  Cardiac: No chest pain, shortness of breath, palpitations or syncope  Pulmonary: No difficulty breathing or new cough  General: No fevers  : No dysuria, No hematuria  See HPI for further details.    All other systems reviewed and are negative.     PAST MEDICAL & SURGICAL HISTORY    Past Medical History:   Diagnosis Date    A-fib ([facility]     Acid reflux     Arthritis     Atrial fibrillation (HCC)     CAD (coronary artery disease)     Diarrhea     Hyperlipidemia     Hypertension     Restless leg     Stomach ulcer     TIA (transient ischemic attack) 1995    NO RESIDUAL     Past Surgical History:   Procedure Laterality Date    CHOLECYSTECTOMY      COLONOSCOPY      CORONARY ANGIOPLASTY WITH STENT PLACEMENT  2014    X4    DILATION AND CURETTAGE OF UTERUS N/A 4/2/2019    HYSTEROSCOPY DILATION AND CURETTAGE POLYP REMOVAL performed by Imani Mathews MD at [address] [place] [address]  (may include discharge medications prescribed in the ED)  Current Outpatient Rx   Medication Sig Dispense Refill    ciprofloxacin (CIPRO) 500 MG tablet Take 1 tablet by mouth 2 times daily for 3 days 6 tablet 0    DULoxetine (CYMBALTA) 20 MG extended release capsule Take 20 mg by mouth daily      famotidine (PEPCID) 20 MG tablet Take 20 mg by mouth 2 times daily      furosemide (LASIX) 20 MG tablet TAKE 2 TABLETS EVERY       MORNING AND 1 TABLET EVERY EVENING      aspirin 81 MG tablet Take 81 mg by mouth daily      atenolol (TENORMIN) 50 MG tablet Take 50 mg by mouth daily      lovastatin (MEVACOR) 40 MG tablet Take 40 mg by mouth nightly      nitroGLYCERIN (NITROSTAT) 0.4 MG SL tablet Place 0.4 mg under the tongue every 5 minutes as needed       pantoprazole (PROTONIX) 40 MG tablet Take 40 mg by mouth daily      rOPINIRole (REQUIP) 1 MG tablet Take 1 mg by mouth 5 times daily      warfarin (COUMADIN) 5 MG tablet Take by mouth every evening 5 mg on Mondays, Wednesdays, and Fridays and 2.5 mg all other days      lisinopril (PRINIVIL;ZESTRIL) 10 MG tablet Take 10 mg by mouth daily      potassium chloride (KLOR-CON M) 20 MEQ TBCR extended release tablet Take 20 mEq by mouth daily (with breakfast)      HYDROcodone-acetaminophen (NORCO) 5-325 MG per tablet Take 0.5 tablets by mouth nightly as needed for Pain. ALLERGIES    Allergies   Allergen Reactions    Penicillins Rash       SOCIAL AND FAMILY HISTORY    Social History     Socioeconomic History    Marital status:    Tobacco Use    Smoking status: Never    Smokeless tobacco: Never   Vaping Use    Vaping Use: Never used   Substance and Sexual Activity    Alcohol use: Yes     Comment: OCCAS    Drug use: Never    Sexual activity: Not Currently     No family history on file. PHYSICAL EXAM    VITAL SIGNS: BP (!) 161/84   Pulse 67   Temp 97.4 °F (36.3 °C) (Oral)   Resp 18   Ht 5' 7\" (1.702 m)   Wt 167 lb 8.8 oz (76 kg)   SpO2 95%   BMI 26.24 kg/m²   Constitutional:  Well developed, well nourished, no acute distress  Eyes:  Sclera nonicteric, conjunctiva moist  HENT:  Atraumatic, nose normal  Neck: no JVD  Respiratory:  No retractions, no accessory muscle use, normal breath sounds   Cardiovascular: regular rate, no murmurs  GI:  no abdominal tenderness to palpation, soft, no guarding, bowel sounds present, no audible bruits or palpable pulsatile masses, no ecchymosis visible on abdominal wall  Musculoskeletal:  No edema, no acute deformities  Vascular: DP pulses 2+ and equal bilaterally  Integument: No rashes, skin dry  Neurologic:  Alert & oriented, no slurred speech  Psychiatric: Cooperative, pleasant affect       RADIOLOGY/PROCEDURES    CT ABDOMEN PELVIS W IV CONTRAST Additional Contrast? None   Final Result   Moderate hiatal hernia with distal esophageal thickening immediately proximal   to the hernia sac; correlate for esophagitis. Moderate stool throughout the colon. Diverticulosis, without robin diverticulitis. Anterior wedging of T12 and L1, age indeterminate, though new as compared to   prior study dated 04/17/2020.              ED COURSE & MEDICAL DECISION MAKING    Pertinent Labs & Imaging studies reviewed and interpreted. (See chart for details)     See chart for details of medications given during the ED stay. Vitals:    12/11/22 1019 12/11/22 1240   BP: 135/74 (!) 161/84   Pulse: 75 67   Resp: 15 18   Temp: 97.8 °F (36.6 °C) 97.4 °F (36.3 °C)   TempSrc:  Oral   SpO2: 96% 95%   Weight: 167 lb 8.8 oz (76 kg)    Height: 5' 7\" (1.702 m)        Differential diagnosis: Abdominal Aortic Aneurysm, Ischemic Bowel, Bowel Obstruction, Appendicitis, Diverticulitis, Pyelonephritis, UTI, STD, Ureterolithiasis, Colitis, Gonad Torsion, other    Patient is afebrile and nontoxic in appearance. Labs reveal no leukocytosis or anemia. Metabolic panel unremarkable. Lipase within normal limits. Urinalysis unremarkable. CT findings as above, patient has no symptoms of esophagitis. I reexamined her, she has no spinal tenderness. Daughter is at the bedside, states that she believes the fractures are old from a fall over a year ago. Reevaluation at 12:50 PM: Patient is resting comfortably. No complaints of pain at this time. Will prescribe MiraLAX for constipation. The patient was instructed to follow up as an outpatient in 2 days. The patient was instructed to return to the ED immediately for any new or worsening symptoms. The patient verbalized understanding. FINAL IMPRESSION    1. Acute abdominal pain in right lower quadrant    2. Acute constipation    3.  Closed wedge compression fracture of L1 vertebra[facility]    4. Closed wedge compression fracture of T12 vertebra, initial encounter [facility])        PLAN  Discharge with outpatient follow-up    (Please note that this note was completed with a voice recognition program.  Every attempt was made to edit the dictations, but inevitably there remain words that are mis-transcribed.)       William Romero  12/11/22 1246

## 2023-01-04 RX ORDER — MILK THISTLE 500 MG
1 CAPSULE ORAL DAILY
COMMUNITY

## 2023-01-04 NOTE — PROGRESS NOTES
[address] time___12:30PM_________        Surgery time______2PM______    Take the following medications with a sip of water: Follow your MD/Surgeons pre-procedure instructions regarding your medications     Do not eat or drink anything after 12:00 midnight prior to your surgery. This includes water chewing gum, mints and ice chips. You may brush your teeth and gargle the morning of your surgery, but do not swallow the water     Please see your family doctor/pediatrician for a history and physical and/or concerning medications. Bring any test results/reports from your physicians office. If you are under the care of a heart doctor or specialist doctor, please be aware that you may be asked to them for clearance    You may be asked to stop blood thinners such as Coumadin, Plavix, Fragmin, Lovenox, etc., or any anti-inflammatories such as:  Aspirin, Ibuprofen, Advil, Naproxen prior to your surgery. MAY TAKE TYLENOL   We also ask that you stop any OTC medications such as fish oil, vitamin E, glucosamine, garlic, Multivitamins, COQ 10, etc.    We ask that you do not smoke 24 hours prior to surgery  We ask that you do not  drink any alcoholic beverages 24 hours prior to surgery     You must make arrangements for a responsible adult to take you home after your surgery. For your safety you will not be allowed to leave alone or drive yourself home. Your surgery will be cancelled if you do not have a ride home. Also for your safety, it is strongly suggested that someone stay with you the first 24 hours after your surgery. A parent or legal guardian must accompany a child scheduled for surgery and plan to stay at the hospital until the child is discharged. Please do not bring other children with you. For your comfort, please wear simple loose fitting clothing to the hospital.  Please do not bring valuables.     Do not wear any make-up or nail polish on your fingers or toes      For your safety, please do not wear any jewelry or body piercing's on the day of surgery. All jewelry must be removed. If you have dentures, they will be removed before going to operating room. For your convenience, we will provide you with a container. If you wear contact lenses or glasses, they will be removed, please bring a case for them. If you have a living will and a durable power of  for healthcare, please bring in a copy. As part of our patient safety program to minimize surgical site infections, we ask you to do the following:    Please notify your surgeon if you develop any illness between         now and the  day of your surgery. This includes a cough, cold, fever, sore throat, nausea,         or vomiting, and diarrhea, etc.   Please notify your surgeon if you experience dizziness, shortness         of breath or blurred vision between now and the time of your surgery. Do not shave your operative site 96 hours prior to surgery. For face and neck surgery, men may use an electric razor 48 hours   prior to surgery. You may shower the night before surgery or the morning of   your surgery with an antibacterial soap. You will need to bring a photo ID and insurance card    [facility] has an onsite pharmacy, would you like to utilize our pharmacy     If you will be staying overnight and use a C-pap machine, please bring   your C-pap to hospital     Our goal is to provide you with excellent care, therefore, visitors will be limited to two(2) in the room at a time so that we may focus on providing this care for you. Please contact pre-admission testing if you have any further questions.                  [facility] phone number:  [address] [facility] fax number:  [phone]  Please note these are generalized instructions for all surgical cases, you may be provided with more specific instructions according to your surgery. C-Difficile admission screening and protocol:       * Admitted with diarrhea? [] YES    [x]  NO     *Prior history of C-Diff. In last 3 months? [] YES    [x]  NO     *Antibiotic use in the past 6-8 weeks? []  NO    [x]  YES                 If yes, which ANTIBIOTIC AND REASON___UTI___     *Prior hospitalization or nursing home in the last month? [x]  YES    []  NO CHEST PAIN        SAFETY FIRST. .call before you fall

## 2023-01-31 NOTE — PROGRESS NOTES
C-diff Questionnaire:     * Admitted with diarrhea? [] YES    [x]  NO     *Prior history of C-Diff. In last 3 months? [] YES    [x]  NO     *Antibiotic use in the past 6-8 weeks? [x]  NO    []  YES      If yes, which: REASON_________________     *Prior hospitalization or nursing home in the last month? []  YES    [x]  NO     SAFETY FIRST. .call before you fall    [address] time_1045 on 2/10/23_        Surgery 1215______    Do not eat or drink anything after 12:00 midnight prior to your surgery. This includes water chewing gum, mints and ice chips- the Day of Surgery. You may brush your teeth and gargle the morning of your surgery, but do not swallow the water     Please see your family doctor/pediatrician for a history and physical and/or questions concerning medications. Bring any test results/reports from your physicians office. If you are under the care of a heart doctor or specialist doctor, please be aware that you may be asked to them for clearance    You may be asked to stop blood thinners such as Coumadin, Plavix, Fragmin, Lovenox, etc., or any anti-inflammatories such as:  Aspirin, Ibuprofen, Advil, Naproxen prior to your surgery. We also ask that you stop any OTC medications such as fish oil, vitamin E, glucosamine, garlic, Multivitamins, COQ 10, etc.    We ask that you do not smoke 24 hours prior to surgery  We ask that you do not  drink any alcoholic beverages 24 hours prior to surgery     You must make arrangements for a responsible adult to take you home after your surgery. For your safety you will not be allowed to leave alone or drive yourself home. Your surgery will be cancelled if you do not have a ride home. Also for your safety, it is strongly suggested that someone stay with you the first 24 hours after your surgery.      A parent or legal guardian must accompany a child scheduled for surgery and plan to stay at the hospital until the child is discharged. Please do not bring other children with you. For your comfort, please wear simple loose fitting clothing to the hospital.  Please do not bring valuables. Do not wear any make-up or nail polish on your fingers or toes. For your safety, please do not wear any jewelry or body piercing's on the day of surgery. All jewelry must be removed. If you have dentures, they will be removed before going to operating room. For your convenience, we will provide you with a container. If you wear contact lenses or glasses, they will be removed, please bring a case for them. If you have a living will and a durable power of  for healthcare, please bring in a copy. As part of our patient safety program to minimize surgical site infections, we ask you to do the following:    Please notify your surgeon if you develop any illness between         now and the day of your surgery. This includes a cough, cold, fever, sore throat, nausea,         or vomiting, and diarrhea, etc.   Please notify your surgeon if you experience dizziness, shortness         of breath or blurred vision between now and the time of your surgery. Do not shave your operative site 96 hours prior to surgery. For face and neck surgery, men may use an electric razor 48 hours   prior to surgery. You may shower the night before surgery or the morning of   your surgery with an antibacterial soap. You will need to bring a photo ID and insurance card     If you use a C-pap or Bi-pap machine, please bring your machine with you to the hospital     Our goal is to provide you with excellent care, therefore, visitors will be limited to so that we may focus on providing this care for you. Please contact your surgeon office, if you have any further questions.                  [facility] phone number:  [address] [facility] fax number:  [phone]    Please note these are generalized instructions for all surgical cases, you may be provided with more specific instructions according to your surgery.

## 2023-02-09 ENCOUNTER — ANESTHESIA EVENT (OUTPATIENT)
Dept: ENDOSCOPY | Age: 83
End: 2023-02-09
Payer: MEDICARE

## 2023-02-10 ENCOUNTER — HOSPITAL ENCOUNTER (OUTPATIENT)
Age: 83
Setting detail: OUTPATIENT SURGERY
Discharge: HOME | End: 2023-02-10
Attending: INTERNAL MEDICINE | Admitting: INTERNAL MEDICINE
Payer: MEDICARE

## 2023-02-10 ENCOUNTER — HOSPITAL ENCOUNTER (OUTPATIENT)
Dept: ENDOSCOPY | Age: 83
Setting detail: OUTPATIENT SURGERY
Discharge: HOME | End: 2023-02-10

## 2023-02-10 ENCOUNTER — ANESTHESIA (OUTPATIENT)
Dept: ENDOSCOPY | Age: 83
End: 2023-02-10
Payer: MEDICARE

## 2023-02-10 VITALS
RESPIRATION RATE: 16 BRPM | WEIGHT: 165 LBS | HEIGHT: 67 IN | SYSTOLIC BLOOD PRESSURE: 150 MMHG | OXYGEN SATURATION: 99 % | HEART RATE: 81 BPM | BODY MASS INDEX: 25.9 KG/M2 | TEMPERATURE: 98.3 F | DIASTOLIC BLOOD PRESSURE: 83 MMHG

## 2023-02-10 PROCEDURE — 2580000003 HC RX 258: Performed by: NURSE ANESTHETIST, CERTIFIED REGISTERED

## 2023-02-10 PROCEDURE — 7100000010 HC PHASE II RECOVERY - FIRST 15 MIN: Performed by: INTERNAL MEDICINE

## 2023-02-10 PROCEDURE — 3700000000 HC ANESTHESIA ATTENDED CARE: Performed by: INTERNAL MEDICINE

## 2023-02-10 PROCEDURE — 7100000001 HC PACU RECOVERY - ADDTL 15 MIN: Performed by: INTERNAL MEDICINE

## 2023-02-10 PROCEDURE — 7100000011 HC PHASE II RECOVERY - ADDTL 15 MIN: Performed by: INTERNAL MEDICINE

## 2023-02-10 PROCEDURE — 3700000001 HC ADD 15 MINUTES (ANESTHESIA): Performed by: INTERNAL MEDICINE

## 2023-02-10 PROCEDURE — C1726 CATH, BAL DIL, NON-VASCULAR: HCPCS | Performed by: INTERNAL MEDICINE

## 2023-02-10 PROCEDURE — 6360000002 HC RX W HCPCS: Performed by: NURSE ANESTHETIST, CERTIFIED REGISTERED

## 2023-02-10 PROCEDURE — 7100000000 HC PACU RECOVERY - FIRST 15 MIN: Performed by: INTERNAL MEDICINE

## 2023-02-10 PROCEDURE — 2709999900 HC NON-CHARGEABLE SUPPLY: Performed by: INTERNAL MEDICINE

## 2023-02-10 PROCEDURE — 2500000003 HC RX 250 WO HCPCS: Performed by: NURSE ANESTHETIST, CERTIFIED REGISTERED

## 2023-02-10 PROCEDURE — 3609017700 HC EGD DILATION GASTRIC/DUODENAL STRICTURE: Performed by: INTERNAL MEDICINE

## 2023-02-10 RX ORDER — SODIUM CHLORIDE 0.9 % (FLUSH) 0.9 %
5-40 SYRINGE (ML) INJECTION PRN
Status: DISCONTINUED | OUTPATIENT
Start: 2023-02-10 | End: 2023-02-10 | Stop reason: HOSPADM

## 2023-02-10 RX ORDER — SODIUM CHLORIDE 0.9 % (FLUSH) 0.9 %
5-40 SYRINGE (ML) INJECTION EVERY 12 HOURS SCHEDULED
Status: DISCONTINUED | OUTPATIENT
Start: 2023-02-10 | End: 2023-02-10 | Stop reason: HOSPADM

## 2023-02-10 RX ORDER — SODIUM CHLORIDE 9 MG/ML
INJECTION, SOLUTION INTRAVENOUS PRN
Status: DISCONTINUED | OUTPATIENT
Start: 2023-02-10 | End: 2023-02-10 | Stop reason: HOSPADM

## 2023-02-10 RX ORDER — PROPOFOL 10 MG/ML
INJECTION, EMULSION INTRAVENOUS CONTINUOUS PRN
Status: DISCONTINUED | OUTPATIENT
Start: 2023-02-10 | End: 2023-02-10 | Stop reason: SDUPTHER

## 2023-02-10 RX ORDER — ONDANSETRON 2 MG/ML
4 INJECTION INTRAMUSCULAR; INTRAVENOUS
Status: DISCONTINUED | OUTPATIENT
Start: 2023-02-10 | End: 2023-02-10 | Stop reason: HOSPADM

## 2023-02-10 RX ORDER — PROPOFOL 10 MG/ML
INJECTION, EMULSION INTRAVENOUS PRN
Status: DISCONTINUED | OUTPATIENT
Start: 2023-02-10 | End: 2023-02-10 | Stop reason: SDUPTHER

## 2023-02-10 RX ORDER — LIDOCAINE HYDROCHLORIDE 20 MG/ML
INJECTION, SOLUTION EPIDURAL; INFILTRATION; INTRACAUDAL; PERINEURAL PRN
Status: DISCONTINUED | OUTPATIENT
Start: 2023-02-10 | End: 2023-02-10 | Stop reason: SDUPTHER

## 2023-02-10 RX ORDER — SODIUM CHLORIDE 9 MG/ML
INJECTION, SOLUTION INTRAVENOUS CONTINUOUS PRN
Status: DISCONTINUED | OUTPATIENT
Start: 2023-02-10 | End: 2023-02-10 | Stop reason: SDUPTHER

## 2023-02-10 RX ADMIN — SODIUM CHLORIDE: 9 INJECTION, SOLUTION INTRAVENOUS at 12:04

## 2023-02-10 RX ADMIN — PROPOFOL 50 MG: 10 INJECTION, EMULSION INTRAVENOUS at 12:14

## 2023-02-10 RX ADMIN — PROPOFOL 160 MCG/KG/MIN: 10 INJECTION, EMULSION INTRAVENOUS at 12:14

## 2023-02-10 RX ADMIN — LIDOCAINE HYDROCHLORIDE 80 MG: 20 INJECTION, SOLUTION EPIDURAL; INFILTRATION; INTRACAUDAL; PERINEURAL at 12:14

## 2023-02-10 ASSESSMENT — PAIN - FUNCTIONAL ASSESSMENT: PAIN_FUNCTIONAL_ASSESSMENT: NONE - DENIES PAIN

## 2023-02-10 ASSESSMENT — ENCOUNTER SYMPTOMS: SHORTNESS OF BREATH: 0

## 2023-02-10 NOTE — H&P
[facility]   Attending Pre-operative History and Physical    INDICATION:  dysphagia    PROCEDURE:  EGD    History Obtained From:  patient    HISTORY OF PRESENT ILLNESS:    The patient is a 81 y.o. female presents for an upper endoscopy    Past Medical History:    Past Medical History:   Diagnosis Date    A-fib ([facility]     Acid reflux     Arthritis     Atrial fibrillation ([facility]     CAD (coronary artery disease)     Diarrhea     Fatty liver     Hyperlipidemia     Hypertension     Prolonged emergence from general anesthesia     FAMILY HX-BROTHER-AFTER HEART SURGERY    Restless leg     Stomach ulcer     TIA (transient ischemic attack) 1995    NO RESIDUAL      Past Surgical History:    Past Surgical History:   Procedure Laterality Date    CAROTID ENDARTERECTOMY Right 1995    CAROTID ENDARTERECTOMY Left     CHOLECYSTECTOMY      COLONOSCOPY      CORONARY ANGIOPLASTY WITH STENT PLACEMENT  2014    X4    DILATION AND CURETTAGE OF UTERUS N/A 04/02/2019    HYSTEROSCOPY DILATION AND CURETTAGE POLYP REMOVAL performed by Nicolás Kang MD at [address] [place] [address]      Medications Prior to Admission:   Prior to Admission medications    Medication Sig Start Date End Date Taking?  Authorizing Provider   Milk Thistle 500 MG CAPS Take 1 capsule by mouth daily   Yes Historical Provider, MD   DULoxetine (CYMBALTA) 20 MG extended release capsule Take 20 mg by mouth daily 12/1/22   Historical Provider, MD   famotidine (PEPCID) 20 MG tablet Take 20 mg by mouth 2 times daily 9/24/22   Historical Provider, MD   furosemide (LASIX) 20 MG tablet TAKE 2 TABLETS EVERY       MORNING AND 1 TABLET EVERY EVENING 8/31/22   Historical Provider, MD   aspirin 81 MG tablet Take 81 mg by mouth daily    Historical Provider, MD   atenolol (TENORMIN) 50 MG tablet Take 50 mg by mouth daily 8/10/17   Historical Provider, MD   lovastatin (MEVACOR) 40 MG tablet Take 40 mg by mouth nightly 1/10/17 Historical Provider, MD   nitroGLYCERIN (NITROSTAT) 0.4 MG SL tablet Place 0.4 mg under the tongue every 5 minutes as needed  11/12/08   Historical Provider, MD   pantoprazole (PROTONIX) 40 MG tablet Take 40 mg by mouth at bedtime 8/15/17   Historical Provider, MD   rOPINIRole (REQUIP) 1 MG tablet Take 1 mg by mouth 5 times daily 1/10/17   Historical Provider, MD   warfarin (COUMADIN) 5 MG tablet Take by mouth every evening 5 mg on Mondays, Wednesdays, and Fridays and 2.5 mg all other days 7/25/17   Historical Provider, MD   lisinopril (PRINIVIL;ZESTRIL) 10 MG tablet Take 10 mg by mouth daily    Historical Provider, MD   potassium chloride (KLOR-CON M) 20 MEQ TBCR extended release tablet Take 20 mEq by mouth daily (with breakfast)    Historical Provider, MD   HYDROcodone-acetaminophen (NORCO) 5-325 MG per tablet Take 0.5 tablets by mouth nightly as needed for Pain.     Historical Provider, MD        Allergies:  Gabapentin and Penicillins  History of allergic reaction to anesthesia:  No    Social History:   Social History     Socioeconomic History    Marital status:      Spouse name: Not on file    Number of children: Not on file    Years of education: Not on file    Highest education level: Not on file   Occupational History    Not on file   Tobacco Use    Smoking status: Never    Smokeless tobacco: Never   Vaping Use    Vaping Use: Never used   Substance and Sexual Activity    Alcohol use: Not Currently    Drug use: Never    Sexual activity: Not Currently   Other Topics Concern    Not on file   Social History Narrative    Not on file     Social Determinants of Health     Financial Resource Strain: Not on file   Food Insecurity: Not on file   Transportation Needs: Not on file   Physical Activity: Not on file   Stress: Not on file   Social Connections: Not on file   Intimate Partner Violence: Not on file   Housing Stability: Not on file      Family History:   Family History   Problem Relation Age of Onset Heart Attack Mother     Early Death Father     Heart Attack Sister     Heart Attack Sister     Heart Attack Brother       REVIEW OF SYSTEMS:    Reflux, epigastric pain and dysphagia. PHYSICAL EXAM:      BP (!) 152/68   Pulse 79   Temp 97 °F (36.1 °C) (Temporal)   Resp 18   Ht 5' 7\" (1.702 m)   Wt 165 lb (74.8 kg)   SpO2 97%   BMI 25.84 kg/m²  I      Head/ENT:  normocephalic, without obvious abnormalities, atraumatic    Heart:  normal S1 and S2    Lungs:  No increased work of breathing, good air exchange, clear to auscultation bilaterally,no crackles or wheezing    Abdomen:  Normal bowel sounds, soft nondistended, non tender    Extremities:  No clubbing, cyanosis, or edema      DATA:  reviewed    ASSESSMENT AND PLAN:    1. Patient is a 81 y.o. female with above specified procedure planned EGD with deep sedation  2. Procedure options, risks and benefits reviewed with patient. Patient expresses understanding.

## 2023-02-10 NOTE — PROGRESS NOTES
Vss. Abd soft. Tolerated sitting up and po fluids and crackers well. Daughter at bedside. Verbalized understanding of discharge instructions. No c/o.

## 2023-02-10 NOTE — OP NOTE
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 Anesthesia:  MAC    Estimated blood loss: Minimal    Complications: None    Description of Procedure:  Informed consent was obtained from the patient after explanation of the procedure including indications, description of the procedure,  benefits and possible risks and complications of the procedure, and alternatives. Questions were answered. The patient's history was reviewed and a directed physical examination was performed prior to the procedure. Patient was monitored throughout the procedure with pulse oximetry and periodic assessment of vital signs. Patient was sedated as noted above. With the patient in the left lateral decubitus position, the Olympus videoendoscope was placed in the patient's mouth and under direct visualization passed into the esophagus. Visualization of the esophagus, stomach, and duodenum was performed during both introduction and withdrawal of the endoscope and retroflexed view of the proximal stomach was obtained. The scope was passed to the 2nd portion of the duodenum. The patient tolerated the procedure well and was taken to the recovery area in good condition. Findings: The mucosa in the esophagus is normal.  A stricture was noted at the gastroesophageal junction. Minimal difficulty was encountered at passing the scope through the stricture. The gastric mucosa is normal.  Minimal erythema was noted in the antrum.   The duodenal mucosa is normal.  Stricture at the gastroesophageal junction was sequentially dilated to 12 mm and then 13.5 mm. After the 13.5 mm dilation, a shallow tear was present at the GE junction with self-limited oozing of blood. The procedure was terminated. Recommendations: -Continue acid suppression. Repeat upper endoscopy and dilation in 4 to 6 weeks.     Sonia Berger MD, MD

## 2023-02-10 NOTE — DISCHARGE INSTRUCTIONS
Endoscopy Discharge Instructions    [facility] with any questions or concerns. You may be drowsy or lightheaded after receiving sedation. DO NOT operate  a vehicle (automobile, bicycle, motorcycle, machinery, or power tools), no  alcoholic beverages, and do not make any important decisions today. Plan on bed rest or quiet relaxation today. Resume normal activities in the morning. Resume normal activity tomorrow unless otherwise advised by your physician. Eat a light first meal, avoiding spicy and fatty foods, then resume normal diet unless  you are told otherwise by your physician. If the intravenous medication site is painful, apply warm compresses on the site until the soreness is relieved and elevate the arm above the heart. Call your physician if no improvement  in 2-3 days. POSSIBLE SYMPTOMS TO WATCH:     1. fever (greater than 100) 5. increased abdominal bloating   2. severe pain   6. excessive bleeding   3. nausea and vomiting  7. chest pain   4. chills    8. shortness of breath       [facility] if these problems occur     Expected as normal and remedies:  Sore throat: use over the counter throat lozenges or gargle with warm salt water. Redness or soreness at the IV site: apply warm compress  Gaseous discomfort: belching or passing flatus (gas). Repeat upper endoscopy and dilation in 4-6 weeks    David Polanco MD    With questions or concerns call Dr Ezra Handley at .

## 2023-02-10 NOTE — ANESTHESIA PRE PROCEDURE
Department of Anesthesiology  Preprocedure Note       Name:  Sudeep Walsh   Age:  81 y.o.  :  1940                                          MRN:  [id number]         Date:  2/10/2023      Surgeon: Beto Whitt):  Yandy Driscoll MD    Procedure: Procedure(s):  ESOPHAGOGASTRODUODENOSCOPY WITH BALLOON DILATION OF ESOPHAGUS    Medications prior to admission:   Prior to Admission medications    Medication Sig Start Date End Date Taking?  Authorizing Provider   Milk Thistle 500 MG CAPS Take 1 capsule by mouth daily   Yes Historical Provider, MD   DULoxetine (CYMBALTA) 20 MG extended release capsule Take 20 mg by mouth daily 22   Historical Provider, MD   famotidine (PEPCID) 20 MG tablet Take 20 mg by mouth 2 times daily 22   Historical Provider, MD   furosemide (LASIX) 20 MG tablet TAKE 2 TABLETS EVERY       MORNING AND 1 TABLET EVERY EVENING 22   Historical Provider, MD   aspirin 81 MG tablet Take 81 mg by mouth daily    Historical Provider, MD   atenolol (TENORMIN) 50 MG tablet Take 50 mg by mouth daily 8/10/17   Historical Provider, MD   lovastatin (MEVACOR) 40 MG tablet Take 40 mg by mouth nightly 1/10/17   Historical Provider, MD   nitroGLYCERIN (NITROSTAT) 0.4 MG SL tablet Place 0.4 mg under the tongue every 5 minutes as needed  08   Historical Provider, MD   pantoprazole (PROTONIX) 40 MG tablet Take 40 mg by mouth at bedtime 8/15/17   Historical Provider, MD   rOPINIRole (REQUIP) 1 MG tablet Take 1 mg by mouth 5 times daily 1/10/17   Historical Provider, MD   warfarin (COUMADIN) 5 MG tablet Take by mouth every evening 5 mg on , , and  and 2.5 mg all other days 17   Historical Provider, MD   lisinopril (PRINIVIL;ZESTRIL) 10 MG tablet Take 10 mg by mouth daily    Historical Provider, MD   potassium chloride (KLOR-CON M) 20 MEQ TBCR extended release tablet Take 20 mEq by mouth daily (with breakfast)    Historical Provider, MD   HYDROcodone-acetaminophen (NORCO) 5-325 MG per tablet Take 0.5 tablets by mouth nightly as needed for Pain. Historical Provider, MD       Current medications:    Current Facility-Administered Medications   Medication Dose Route Frequency Provider Last Rate Last Admin    sodium chloride flush 0.9 % injection 5-40 mL  5-40 mL IntraVENous 2 times per day Helen Carey MD        sodium chloride flush 0.9 % injection 5-40 mL  5-40 mL IntraVENous PRN Helen Carey MD        0.9 % sodium chloride infusion   IntraVENous PRN Helen Carey MD           Allergies:     Allergies   Allergen Reactions    Gabapentin Other (See Comments)     Blurred vision    Penicillins Rash       Problem List:    Patient Active Problem List   Diagnosis Code    Chest pain R07.9       Past Medical History:        Diagnosis Date    [facility]fib [facility]     Acid reflux     Arthritis     Atrial fibrillation ([facility]     CAD (coronary artery disease)     Diarrhea     Fatty liver     Hyperlipidemia     Hypertension     Prolonged emergence from general anesthesia     FAMILY HX-BROTHER-AFTER HEART SURGERY    Restless leg     Stomach ulcer     TIA (transient ischemic attack) 1995    NO RESIDUAL       Past Surgical History:        Procedure Laterality Date    CAROTID ENDARTERECTOMY Right 1995    CAROTID ENDARTERECTOMY Left     CHOLECYSTECTOMY      COLONOSCOPY      CORONARY ANGIOPLASTY WITH STENT PLACEMENT  2014    X4    DILATION AND CURETTAGE OF UTERUS N/A 04/02/2019    HYSTEROSCOPY DILATION AND CURETTAGE POLYP REMOVAL performed by David Mendoza MD at [address] [place] [address]       Social History:    Social History     Tobacco Use    Smoking status: Never    Smokeless tobacco: Never   Substance Use Topics    Alcohol use: Not Currently                                Counseling given: Not Answered      Vital Signs (Current):   Vitals:    01/04/23 1725 01/31/23 1344 02/10/23 1113 02/10/23 1120   BP:    (!) 152/68   Pulse:    79   Resp:    18   Temp:    97 °F (36.1 °C)   TempSrc:    Temporal   SpO2:    97%   Weight: 165 lb (74.8 kg) 165 lb (74.8 kg) 165 lb (74.8 kg)    Height: 5' 7\" (1.702 m) 5' 7\" (1.702 m)                                                BP Readings from Last 3 Encounters:   02/10/23 (!) 152/68   12/11/22 (!) 161/84   12/08/22 126/75       NPO Status: Time of last liquid consumption: 2100                        Time of last solid consumption: 2100                        Date of last liquid consumption: 02/09/23                        Date of last solid food consumption: 02/09/23    BMI:   Wt Readings from Last 3 Encounters:   02/10/23 165 lb (74.8 kg)   12/11/22 167 lb 8.8 oz (76 kg)   12/08/22 165 lb 9.1 oz (75.1 kg)     Body mass index is 25.84 kg/m². CBC:   Lab Results   Component Value Date/Time    WBC 5.4 12/11/2022 10:41 AM    RBC 4.23 12/11/2022 10:41 AM    HGB 12.6 12/11/2022 10:41 AM    HCT 38.1 12/11/2022 10:41 AM    MCV 90.0 12/11/2022 10:41 AM    RDW 13.3 12/11/2022 10:41 AM     12/11/2022 10:41 AM       CMP:   Lab Results   Component Value Date/Time     12/11/2022 10:41 AM    K 4.0 12/11/2022 10:41 AM    CL 98 12/11/2022 10:41 AM    CO2 28 12/11/2022 10:41 AM    BUN 10 12/11/2022 10:41 AM    CREATININE <0.5 12/11/2022 10:41 AM    GFRAA >60 04/17/2020 06:00 PM    GFRAA >60 10/06/2011 09:15 AM    AGRATIO 1.1 12/11/2022 10:41 AM    LABGLOM >60 12/11/2022 10:41 AM    GLUCOSE 114 12/11/2022 10:41 AM    PROT 7.5 12/11/2022 10:41 AM    PROT 6.5 10/06/2011 09:15 AM    CALCIUM 9.3 12/11/2022 10:41 AM    BILITOT 0.5 12/11/2022 10:41 AM    ALKPHOS 77 12/11/2022 10:41 AM    AST 21 12/11/2022 10:41 AM    ALT 14 12/11/2022 10:41 AM       POC Tests: No results for input(s): POCGLU, POCNA, POCK, POCCL, POCBUN, POCHEMO, POCHCT in the last 72 hours.     Coags:   Lab Results   Component Value Date/Time    PROTIME 18.0 12/08/2022 05:32 AM    INR 1.49 12/08/2022 05:32 AM       HCG (If Applicable): No results found for: PREGTESTUR, PREGSERUM, HCG, HCGQUANT     ABGs: No results found for: PHART, PO2ART, WGO7PVH, CSW4OJT, BEART, H7EBJCEB     Type & Screen (If Applicable):  No results found for: LABABO, LABRH    Drug/Infectious Status (If Applicable):  Lab Results   Component Value Date/Time    HEPCAB Non-Reactive (Negative) 08/05/2011 09:05 AM       COVID-19 Screening (If Applicable):   Lab Results   Component Value Date/Time    COVID19 Not Detected 12/06/2022 10:51 AM           Anesthesia Evaluation  Patient summary reviewed no history of anesthetic complications:   Airway: Mallampati: II  TM distance: >3 FB   Neck ROM: full  Mouth opening: > = 3 FB   Dental:      Comment: No loose teeth    Pulmonary: breath sounds clear to auscultation      (-) COPD, asthma, shortness of breath, recent URI and sleep apnea                           Cardiovascular:    (+) hypertension:, valvular problems/murmurs: AS, CAD:, CABG/stent (4 stents):, dysrhythmias: atrial fibrillation, murmur, hyperlipidemia        Rhythm: regular  Rate: normal                    Neuro/Psych:   (+) neuromuscular disease (RLS):, TIA,             GI/Hepatic/Renal:   (+) GERD (denies sx today):, PUD,           Endo/Other:        (-) diabetes mellitus, hypothyroidism, hyperthyroidism               Abdominal:             Vascular:   + PVD, aortic or cerebral, . Other Findings:           Anesthesia Plan      MAC     ASA 3       Induction: intravenous. Anesthetic plan and risks discussed with patient. Plan discussed with CRNA. This pre-anesthesia assessment may be used as a history and physical.    DOS STAFF ADDENDUM:    Pt seen and examined, chart reviewed (including anesthesia, drug and allergy history). No interval changes to history and physical examination. Anesthetic plan, risks, benefits, alternatives, and personnel involved discussed with patient. Patient verbalized an understanding and agrees to proceed. Steven Camargo MD  February 10, 2023  11:54 AM        Steven Camargo MD   2/10/2023

## 2023-02-10 NOTE — ANESTHESIA POSTPROCEDURE EVALUATION
Department of Anesthesiology  Postprocedure Note    Patient: Alessandra Osullivan  MRN: [id number]  YOB: 1940  Date of evaluation: 2/10/2023      Procedure Summary     Date: 02/10/23 Room / Location: [address] [place] [address]    Anesthesia Start: 0305 Anesthesia Stop: 0232    Procedure: ESOPHAGOGASTRODUODENOSCOPY WITH BALLOON DILATION OF ESOPHAGUS Diagnosis:       Dysphagia, unspecified type      (DYSPHAGIA)    Surgeons: Brooke Velazquez MD Responsible Provider: Erich Roca MD    Anesthesia Type: MAC ASA Status: 3          Anesthesia Type: No value filed.     Saad Phase I: Saad Score: 10    Saad Phase II:        Anesthesia Post Evaluation    Patient location during evaluation: PACU  Patient participation: complete - patient participated  Level of consciousness: awake and alert  Airway patency: patent  Nausea & Vomiting: no nausea and no vomiting  Complications: no  Cardiovascular status: hemodynamically stable  Respiratory status: acceptable  Hydration status: stable

## 2023-03-16 ENCOUNTER — ANESTHESIA EVENT (OUTPATIENT)
Dept: ENDOSCOPY | Age: 83
End: 2023-03-16
Payer: MEDICARE

## 2023-03-16 NOTE — FLOWSHEET NOTE
Preoperative Screening for Elective Surgery/Invasive Procedures While COVID-19 present in the community     Have you tested positive or have been told to self-isolate for COVID-19 like symptoms within the past 28 days? Do you currently have any of the following symptoms? Fever >100.0 F or 99.9 F in immunocompromised patients? New onset cough, shortness of breath or difficulty breathing? New onset sore throat, myalgia (muscle aches and pains), headache, loss of taste/smell or diarrhea? Have you had a potential exposure to COVID-19 within the past 14 days by:  Close contact with a confirmed case? Close contact with a healthcare worker,  or essential infrastructure worker (grocery store, [employer], gas station, public utilities or transportation)? Do you reside in a congregate setting such as; skilled nursing facility, adult home, correctional facility, homeless shelter or other institutional setting? Have you had recent travel to a known COVID-19 hotspot? No to all above       * Admitted with diarrhea? [] YES    [x]  NO     *Prior history of C-Diff. In last 3 months? [] YES    [x]  NO     *Antibiotic use in the past 6-8 weeks? [x]  NO    []  YES      If yes, which: REASON_________________     *Prior hospitalization or nursing home in the last month? []  YES    [x]  NO     SAFETY FIRST. .call before you fall    [facility] time__3/17/23 1030__________        Surgery time____1200________    Do not eat or drink anything after 12:00 midnight prior to your surgery. This includes water chewing gum, mints and ice chips- the Day of Surgery. You may brush your teeth and gargle the morning of your surgery, but do not swallow the water     Please see your family doctor/pediatrician for a history and physical and/or questions concerning medications. Bring any test results/reports from your physicians office.    If you are under the care of a heart doctor or specialist doctor, please be aware that you may be asked to them for clearance    You may be asked to stop blood thinners such as Coumadin, Plavix, Fragmin, Lovenox, etc., or any anti-inflammatories such as:  Aspirin, Ibuprofen, Advil, Naproxen prior to your surgery. We also ask that you stop any OTC medications such as fish oil, vitamin E, glucosamine, garlic, Multivitamins, COQ 10, etc.    We ask that you do not smoke 24 hours prior to surgery  We ask that you do not  drink any alcoholic beverages 24 hours prior to surgery     You must make arrangements for a responsible adult to take you home after your surgery. For your safety you will not be allowed to leave alone or drive yourself home. Your surgery will be cancelled if you do not have a ride home. Also for your safety, it is strongly suggested that someone stay with you the first 24 hours after your surgery. A parent or legal guardian must accompany a child scheduled for surgery and plan to stay at the hospital until the child is discharged. Please do not bring other children with you. For your comfort, please wear simple loose fitting clothing to the hospital.  Please do not bring valuables. Do not wear any make-up or nail polish on your fingers or toes. For your safety, please do not wear any jewelry or body piercing's on the day of surgery. All jewelry must be removed. If you have dentures, they will be removed before going to operating room. For your convenience, we will provide you with a container. If you wear contact lenses or glasses, they will be removed, please bring a case for them. If you have a living will and a durable power of  for healthcare, please bring in a copy.      As part of our patient safety program to minimize surgical site infections, we ask you to do the following:    Please notify your surgeon if you develop any illness between         now and the day of your surgery. This includes a cough, cold, fever, sore throat, nausea,         or vomiting, and diarrhea, etc.   Please notify your surgeon if you experience dizziness, shortness         of breath or blurred vision between now and the time of your surgery. Do not shave your operative site 96 hours prior to surgery. For face and neck surgery, men may use an electric razor 48 hours   prior to surgery. You may shower the night before surgery or the morning of   your surgery with an antibacterial soap. You will need to bring a photo ID and insurance card     If you use a C-pap or Bi-pap machine, please bring your machine with you to the hospital     Our goal is to provide you with excellent care, therefore, visitors will be limited to so that we may focus on providing this care for you. Please contact your surgeon office, if you have any further questions. [facility] phone number:  [address] [facility] fax number:  [phone]    Please note these are generalized instructions for all surgical cases, you may be provided with more specific instructions according to your surgery.

## 2023-03-17 ENCOUNTER — HOSPITAL ENCOUNTER (OUTPATIENT)
Age: 83
Setting detail: OUTPATIENT SURGERY
Discharge: HOME | End: 2023-03-17
Attending: INTERNAL MEDICINE | Admitting: INTERNAL MEDICINE
Payer: MEDICARE

## 2023-03-17 ENCOUNTER — ANESTHESIA (OUTPATIENT)
Dept: ENDOSCOPY | Age: 83
End: 2023-03-17
Payer: MEDICARE

## 2023-03-17 VITALS
HEART RATE: 72 BPM | DIASTOLIC BLOOD PRESSURE: 74 MMHG | OXYGEN SATURATION: 95 % | BODY MASS INDEX: 27.2 KG/M2 | WEIGHT: 173.28 LBS | SYSTOLIC BLOOD PRESSURE: 137 MMHG | HEIGHT: 67 IN | TEMPERATURE: 96.9 F | RESPIRATION RATE: 16 BRPM

## 2023-03-17 PROCEDURE — 7100000010 HC PHASE II RECOVERY - FIRST 15 MIN: Performed by: INTERNAL MEDICINE

## 2023-03-17 PROCEDURE — 2580000003 HC RX 258: Performed by: NURSE ANESTHETIST, CERTIFIED REGISTERED

## 2023-03-17 PROCEDURE — C1726 CATH, BAL DIL, NON-VASCULAR: HCPCS | Performed by: INTERNAL MEDICINE

## 2023-03-17 PROCEDURE — 7100000011 HC PHASE II RECOVERY - ADDTL 15 MIN: Performed by: INTERNAL MEDICINE

## 2023-03-17 PROCEDURE — 3700000000 HC ANESTHESIA ATTENDED CARE: Performed by: INTERNAL MEDICINE

## 2023-03-17 PROCEDURE — 3609017700 HC EGD DILATION GASTRIC/DUODENAL STRICTURE: Performed by: INTERNAL MEDICINE

## 2023-03-17 PROCEDURE — 7100000000 HC PACU RECOVERY - FIRST 15 MIN: Performed by: INTERNAL MEDICINE

## 2023-03-17 PROCEDURE — 6360000002 HC RX W HCPCS: Performed by: NURSE ANESTHETIST, CERTIFIED REGISTERED

## 2023-03-17 PROCEDURE — 2709999900 HC NON-CHARGEABLE SUPPLY: Performed by: INTERNAL MEDICINE

## 2023-03-17 PROCEDURE — 2500000003 HC RX 250 WO HCPCS: Performed by: NURSE ANESTHETIST, CERTIFIED REGISTERED

## 2023-03-17 PROCEDURE — 3700000001 HC ADD 15 MINUTES (ANESTHESIA): Performed by: INTERNAL MEDICINE

## 2023-03-17 PROCEDURE — 7100000001 HC PACU RECOVERY - ADDTL 15 MIN: Performed by: INTERNAL MEDICINE

## 2023-03-17 RX ORDER — ONDANSETRON 2 MG/ML
4 INJECTION INTRAMUSCULAR; INTRAVENOUS
Status: DISCONTINUED | OUTPATIENT
Start: 2023-03-17 | End: 2023-03-17 | Stop reason: HOSPADM

## 2023-03-17 RX ORDER — SODIUM CHLORIDE 0.9 % (FLUSH) 0.9 %
5-40 SYRINGE (ML) INJECTION PRN
Status: DISCONTINUED | OUTPATIENT
Start: 2023-03-17 | End: 2023-03-17 | Stop reason: HOSPADM

## 2023-03-17 RX ORDER — SODIUM CHLORIDE 9 MG/ML
INJECTION, SOLUTION INTRAVENOUS CONTINUOUS PRN
Status: DISCONTINUED | OUTPATIENT
Start: 2023-03-17 | End: 2023-03-17 | Stop reason: SDUPTHER

## 2023-03-17 RX ORDER — SODIUM CHLORIDE 9 MG/ML
INJECTION, SOLUTION INTRAVENOUS PRN
Status: DISCONTINUED | OUTPATIENT
Start: 2023-03-17 | End: 2023-03-17 | Stop reason: HOSPADM

## 2023-03-17 RX ORDER — GLYCOPYRROLATE 0.2 MG/ML
INJECTION INTRAMUSCULAR; INTRAVENOUS PRN
Status: DISCONTINUED | OUTPATIENT
Start: 2023-03-17 | End: 2023-03-17 | Stop reason: SDUPTHER

## 2023-03-17 RX ORDER — SODIUM CHLORIDE 0.9 % (FLUSH) 0.9 %
5-40 SYRINGE (ML) INJECTION EVERY 12 HOURS SCHEDULED
Status: DISCONTINUED | OUTPATIENT
Start: 2023-03-17 | End: 2023-03-17 | Stop reason: HOSPADM

## 2023-03-17 RX ORDER — PROPOFOL 10 MG/ML
INJECTION, EMULSION INTRAVENOUS CONTINUOUS PRN
Status: DISCONTINUED | OUTPATIENT
Start: 2023-03-17 | End: 2023-03-17 | Stop reason: SDUPTHER

## 2023-03-17 RX ORDER — LIDOCAINE HYDROCHLORIDE 20 MG/ML
INJECTION, SOLUTION EPIDURAL; INFILTRATION; INTRACAUDAL; PERINEURAL PRN
Status: DISCONTINUED | OUTPATIENT
Start: 2023-03-17 | End: 2023-03-17 | Stop reason: SDUPTHER

## 2023-03-17 RX ORDER — PROPOFOL 10 MG/ML
INJECTION, EMULSION INTRAVENOUS PRN
Status: DISCONTINUED | OUTPATIENT
Start: 2023-03-17 | End: 2023-03-17 | Stop reason: SDUPTHER

## 2023-03-17 RX ORDER — PHENYLEPHRINE HCL IN 0.9% NACL 1 MG/10 ML
SYRINGE (ML) INTRAVENOUS PRN
Status: DISCONTINUED | OUTPATIENT
Start: 2023-03-17 | End: 2023-03-17 | Stop reason: SDUPTHER

## 2023-03-17 RX ORDER — DROPERIDOL 2.5 MG/ML
0.62 INJECTION, SOLUTION INTRAMUSCULAR; INTRAVENOUS
Status: DISCONTINUED | OUTPATIENT
Start: 2023-03-17 | End: 2023-03-17 | Stop reason: HOSPADM

## 2023-03-17 RX ADMIN — Medication 100 MCG: at 12:17

## 2023-03-17 RX ADMIN — SODIUM CHLORIDE: 9 INJECTION, SOLUTION INTRAVENOUS at 11:56

## 2023-03-17 RX ADMIN — GLYCOPYRROLATE 0.1 MG: 0.2 INJECTION INTRAMUSCULAR; INTRAVENOUS at 12:07

## 2023-03-17 RX ADMIN — LIDOCAINE HYDROCHLORIDE 60 MG: 20 INJECTION, SOLUTION EPIDURAL; INFILTRATION; INTRACAUDAL; PERINEURAL at 12:12

## 2023-03-17 RX ADMIN — PROPOFOL 60 MG: 10 INJECTION, EMULSION INTRAVENOUS at 12:12

## 2023-03-17 RX ADMIN — PROPOFOL 160 MCG/KG/MIN: 10 INJECTION, EMULSION INTRAVENOUS at 12:12

## 2023-03-17 ASSESSMENT — PAIN SCALES - GENERAL
PAINLEVEL_OUTOF10: 0

## 2023-03-17 ASSESSMENT — ENCOUNTER SYMPTOMS: SHORTNESS OF BREATH: 0

## 2023-03-17 NOTE — OP NOTE
Esophagogastroduodenoscopy Note    Patient:   Lilly Tran    :    1940    Facility:   [facility] [Outpatient]   Referring/PCP: Hamida Sampson MD    Procedure:   Esophagogastroduodenoscopy   Date:     3/17/2023   Endoscopist:  James Collins MD     Preoperative Diagnosis:    Dysphagia    Postoperative Diagnosis:  small hiatal hernia, stricture at the GEJ dilated to 13.5 mm    Anesthesia:  MAC    Estimated blood loss: Minimal    Complications: None    Specimen: no    Instrument:     Description of Procedure:  Informed consent was obtained from the patient after explanation of the procedure including indications, description of the procedure,  benefits and possible risks and complications of the procedure, and alternatives. Questions were answered. The patient's history was reviewed and a directed physical examination was performed prior to the procedure. Patient was monitored throughout the procedure with pulse oximetry and periodic assessment of vital signs. Patient was sedated as noted above. With the patient in the left lateral decubitus position, the Olympus videoendoscope was placed in the patient's mouth and under direct visualization passed into the esophagus. Visualization of the esophagus, stomach, and duodenum was performed during both introduction and withdrawal of the endoscope and retroflexed view of the proximal stomach was obtained. The scope was passed to the 2nd portion of the duodenum. The patient tolerated the procedure well and was taken to the recovery area in good condition. Findings: mucosa in the esophagus is normal. Stricture at the GEJ dilated to 12mm and than to 13.5 mm. Tear was present post dilation. The gastric mucosa is normal. The duodenal mucosa is normal.      Recommendations: -Continue acid suppression. Patient is doing significantly better at this time.  Follow up as needed    James Collins MD, MD

## 2023-03-17 NOTE — H&P
Gastroenteroloy   Attending Pre-operative History and Physical      PROCEDURE:  EGD    Indication:The patient is a 82 y.o. female presents for an EGD    Past Medical History:    Past Medical History:   Diagnosis Date    A-fib (HCC)     Acid reflux     Arthritis     Atrial fibrillation (HCC)     CAD (coronary artery disease)     Diarrhea     Dysphagia 03/2023    \"my throat has problems closing up.\"    Fatty liver     Hyperlipidemia     Hypertension     Prolonged emergence from general anesthesia     FAMILY HX-BROTHER-AFTER HEART SURGERY    Restless leg     Stomach ulcer     TIA (transient ischemic attack) 1995    NO RESIDUAL      Past Surgical History:    Past Surgical History:   Procedure Laterality Date    CAROTID ENDARTERECTOMY Right 1995    CAROTID ENDARTERECTOMY Left     CHOLECYSTECTOMY      COLONOSCOPY      CORONARY ANGIOPLASTY WITH STENT PLACEMENT  2014    X4    DILATION AND CURETTAGE OF UTERUS N/A 04/02/2019    HYSTEROSCOPY DILATION AND CURETTAGE POLYP REMOVAL performed by Zahira Wilkes MD at [facility] OR    EYE SURGERY      LIPOMA RESECTION      LIPOMA REMOVAL SHOULDER    UPPER GASTROINTESTINAL ENDOSCOPY N/A 2/10/2023    ESOPHAGOGASTRODUODENOSCOPY WITH BALLOON DILATION OF ESOPHAGUS performed by Alex Miranda MD at [facility] ENDOSCOPY      Medications Prior to Admission:   Prior to Admission medications    Medication Sig Start Date End Date Taking? Authorizing Provider   Milk Thistle 500 MG CAPS Take 1 capsule by mouth daily    Historical Provider, MD   DULoxetine (CYMBALTA) 20 MG extended release capsule Take 20 mg by mouth daily 12/1/22   Historical Provider, MD   famotidine (PEPCID) 20 MG tablet Take 20 mg by mouth 2 times daily 9/24/22   Historical Provider, MD   furosemide (LASIX) 20 MG tablet TAKE 2 TABLETS EVERY       MORNING AND 1 TABLET EVERY EVENING 8/31/22   Historical Provider, MD   aspirin 81 MG tablet Take 81 mg by mouth daily    Historical Provider, MD   atenolol (TENORMIN) 50 MG tablet  Take 50 mg by mouth daily 8/10/17   Historical Provider, MD   lovastatin (MEVACOR) 40 MG tablet Take 40 mg by mouth nightly 1/10/17   Historical Provider, MD   nitroGLYCERIN (NITROSTAT) 0.4 MG SL tablet Place 0.4 mg under the tongue every 5 minutes as needed  11/12/08   Historical Provider, MD   pantoprazole (PROTONIX) 40 MG tablet Take 40 mg by mouth at bedtime 8/15/17   Historical Provider, MD   rOPINIRole (REQUIP) 1 MG tablet Take 1 mg by mouth 5 times daily 1/10/17   Historical Provider, MD   warfarin (COUMADIN) 5 MG tablet Take by mouth every evening 5 mg on Mondays, Wednesdays, and Fridays and 2.5 mg all other days 7/25/17   Historical Provider, MD   lisinopril (PRINIVIL;ZESTRIL) 10 MG tablet Take 10 mg by mouth daily    Historical Provider, MD   potassium chloride (KLOR-CON M) 20 MEQ TBCR extended release tablet Take 20 mEq by mouth daily (with breakfast)    Historical Provider, MD   HYDROcodone-acetaminophen (NORCO) 5-325 MG per tablet Take 0.5 tablets by mouth nightly as needed for Pain.     Historical Provider, MD        Allergies:  Gabapentin and Penicillins  History of allergic reaction to anesthesia:  No    Social History:   Social History     Socioeconomic History    Marital status:      Spouse name: Not on file    Number of children: Not on file    Years of education: Not on file    Highest education level: Not on file   Occupational History    Not on file   Tobacco Use    Smoking status: Never    Smokeless tobacco: Never   Vaping Use    Vaping Use: Never used   Substance and Sexual Activity    Alcohol use: Not Currently    Drug use: Never    Sexual activity: Not Currently   Other Topics Concern    Not on file   Social History Narrative    Not on file     Social Determinants of Health     Financial Resource Strain: Not on file   Food Insecurity: Not on file   Transportation Needs: Not on file   Physical Activity: Not on file   Stress: Not on file   Social Connections: Not on file   Intimate Partner Violence: Not on file   Housing Stability: Not on file      Family History:   Family History   Problem Relation Age of Onset    Heart Attack Mother     Early Death Father     Heart Attack Sister     Heart Attack Sister     Heart Attack Brother         PHYSICAL EXAM:      /67   Pulse 69   Temp 98.2 °F (36.8 °C)   Resp 18   Ht 5' 7\" (1.702 m)   Wt 173 lb 4.5 oz (78.6 kg)   SpO2 97%   BMI 27.14 kg/m²  I      Head/ENT:  normocephalic, without obvious abnormalities, atraumatic    Heart:  normal S1 and S2    Lungs:  No increased work of breathing, good air exchange, clear to auscultation bilaterally,no crackles or wheezing    Abdomen:  Normal bowel sounds, soft nondistended, non tender    Extremities:  No clubbing, cyanosis, or edema      ASSESSMENT AND PLAN:    1. Patient is a 81 y.o. female with above specified procedure planned EGD with deep sedation  2. Procedure options, risks and benefits reviewed with patient/guardian. Patient/guardian expresses understanding.

## 2023-03-17 NOTE — ANESTHESIA PRE PROCEDURE
Department of Anesthesiology  Preprocedure Note       Name:  Ben Wright   Age:  81 y.o.  :  1940                                          MRN:  [id number]         Date:  3/17/2023      Surgeon: April Martinez):  Ro Ashby MD    Procedure: Procedure(s):  ESOPHAGOGASTRODUODENOSCOPY    Medications prior to admission:   Prior to Admission medications    Medication Sig Start Date End Date Taking?  Authorizing Provider   Milk Thistle 500 MG CAPS Take 1 capsule by mouth daily    Historical Provider, MD   DULoxetine (CYMBALTA) 20 MG extended release capsule Take 20 mg by mouth daily 22   Historical Provider, MD   famotidine (PEPCID) 20 MG tablet Take 20 mg by mouth 2 times daily 22   Historical Provider, MD   furosemide (LASIX) 20 MG tablet TAKE 2 TABLETS EVERY       MORNING AND 1 TABLET EVERY EVENING 22   Historical Provider, MD   aspirin 81 MG tablet Take 81 mg by mouth daily    Historical Provider, MD   atenolol (TENORMIN) 50 MG tablet Take 50 mg by mouth daily 8/10/17   Historical Provider, MD   lovastatin (MEVACOR) 40 MG tablet Take 40 mg by mouth nightly 1/10/17   Historical Provider, MD   nitroGLYCERIN (NITROSTAT) 0.4 MG SL tablet Place 0.4 mg under the tongue every 5 minutes as needed  08   Historical Provider, MD   pantoprazole (PROTONIX) 40 MG tablet Take 40 mg by mouth at bedtime 8/15/17   Historical Provider, MD   rOPINIRole (REQUIP) 1 MG tablet Take 1 mg by mouth 5 times daily 1/10/17   Historical Provider, MD   warfarin (COUMADIN) 5 MG tablet Take by mouth every evening 5 mg on , , and  and 2.5 mg all other days 17   Historical Provider, MD   lisinopril (PRINIVIL;ZESTRIL) 10 MG tablet Take 10 mg by mouth daily    Historical Provider, MD   potassium chloride (KLOR-CON M) 20 MEQ TBCR extended release tablet Take 20 mEq by mouth daily (with breakfast)    Historical Provider, MD   HYDROcodone-acetaminophen (NORCO) 5-325 MG per tablet Take 0.5 tablets by mouth nightly as needed for Pain. Historical Provider, MD       Current medications:    Current Facility-Administered Medications   Medication Dose Route Frequency Provider Last Rate Last Admin    sodium chloride flush 0.9 % injection 5-40 mL  5-40 mL IntraVENous 2 times per day Armani Johnson MD        sodium chloride flush 0.9 % injection 5-40 mL  5-40 mL IntraVENous PRN Armani Johnson MD        0.9 % sodium chloride infusion   IntraVENous PRN Armani Johnson MD           Allergies: Allergies   Allergen Reactions    Gabapentin Other (See Comments)     Blurred vision    Penicillins Rash       Problem List:    Patient Active Problem List   Diagnosis Code    Chest pain R07.9       Past Medical History:        Diagnosis Date    [facility]fib [facility]     Acid reflux     Arthritis     Atrial fibrillation ([facility]     CAD (coronary artery disease)     Diarrhea     Dysphagia 03/2023    \"my throat has problems closing up. \"    Fatty liver     Hyperlipidemia     Hypertension     Prolonged emergence from general anesthesia     FAMILY HX-BROTHER-AFTER HEART SURGERY    Restless leg     Stomach ulcer     TIA (transient ischemic attack) 1995    NO RESIDUAL       Past Surgical History:        Procedure Laterality Date    CAROTID ENDARTERECTOMY Right 1995    CAROTID ENDARTERECTOMY Left     CHOLECYSTECTOMY      COLONOSCOPY      CORONARY ANGIOPLASTY WITH STENT PLACEMENT  2014    X4    DILATION AND CURETTAGE OF UTERUS N/A 04/02/2019    HYSTEROSCOPY DILATION AND CURETTAGE POLYP REMOVAL performed by Juan M Justin MD at [facility] RESECTION      LIPOMA REMOVAL SHOULDER    UPPER GASTROINTESTINAL ENDOSCOPY N/A 2/10/2023    ESOPHAGOGASTRODUODENOSCOPY WITH BALLOON DILATION OF ESOPHAGUS performed by Mirella Meneses MD at [facility] History:    Social History     Tobacco Use    Smoking status: Never    Smokeless tobacco: Never   Substance Use Topics    Alcohol use: Not Currently                                Counseling given: Not Answered      Vital Signs (Current):   Vitals:    03/16/23 0844 03/17/23 1045 03/17/23 1100   BP:   139/67   Pulse:   69   Resp:   18   Temp:   98.2 °F (36.8 °C)   SpO2:   97%   Weight: 165 lb (74.8 kg) 173 lb 4.5 oz (78.6 kg)    Height: 5' 7\" (1.702 m) 5' 7\" (1.702 m)                                               BP Readings from Last 3 Encounters:   03/17/23 139/67   02/10/23 (!) 150/83   12/11/22 (!) 161/84       NPO Status: Time of last liquid consumption: 2100                        Time of last solid consumption: 2100                        Date of last liquid consumption: 03/16/23                        Date of last solid food consumption: 03/16/23    BMI:   Wt Readings from Last 3 Encounters:   03/17/23 173 lb 4.5 oz (78.6 kg)   02/10/23 165 lb (74.8 kg)   12/11/22 167 lb 8.8 oz (76 kg)     Body mass index is 27.14 kg/m².     CBC:   Lab Results   Component Value Date/Time    WBC 5.4 12/11/2022 10:41 AM    RBC 4.23 12/11/2022 10:41 AM    HGB 12.6 12/11/2022 10:41 AM    HCT 38.1 12/11/2022 10:41 AM    MCV 90.0 12/11/2022 10:41 AM    RDW 13.3 12/11/2022 10:41 AM     12/11/2022 10:41 AM       CMP:   Lab Results   Component Value Date/Time     12/11/2022 10:41 AM    K 4.0 12/11/2022 10:41 AM    CL 98 12/11/2022 10:41 AM    CO2 28 12/11/2022 10:41 AM    BUN 10 12/11/2022 10:41 AM    CREATININE <0.5 12/11/2022 10:41 AM    GFRAA >60 04/17/2020 06:00 PM    GFRAA >60 10/06/2011 09:15 AM    AGRATIO 1.1 12/11/2022 10:41 AM    LABGLOM >60 12/11/2022 10:41 AM    GLUCOSE 114 12/11/2022 10:41 AM    PROT 7.5 12/11/2022 10:41 AM    PROT 6.5 10/06/2011 09:15 AM    CALCIUM 9.3 12/11/2022 10:41 AM    BILITOT 0.5 12/11/2022 10:41 AM    ALKPHOS 77 12/11/2022 10:41 AM    AST 21 12/11/2022 10:41 AM    ALT 14 12/11/2022 10:41 AM       POC Tests: No results for input(s): POCGLU, POCNA, POCK, POCCL, POCBUN, POCHEMO, POCHCT in the last 72 hours. Coags:   Lab Results   Component Value Date/Time    PROTIME 18.0 12/08/2022 05:32 AM    INR 1.49 12/08/2022 05:32 AM       HCG (If Applicable): No results found for: PREGTESTUR, PREGSERUM, HCG, HCGQUANT     ABGs: No results found for: PHART, PO2ART, NIB6SMX, ZRX5NHZ, BEART, K3NZHVGJ     Type & Screen (If Applicable):  No results found for: LABABO, LABRH    Drug/Infectious Status (If Applicable):  Lab Results   Component Value Date/Time    HEPCAB Non-Reactive (Negative) 08/05/2011 09:05 AM       COVID-19 Screening (If Applicable):   Lab Results   Component Value Date/Time    COVID19 Not Detected 12/06/2022 10:51 AM           Anesthesia Evaluation  Patient summary reviewed no history of anesthetic complications:   Airway: Mallampati: II  TM distance: >3 FB   Neck ROM: full  Mouth opening: > = 3 FB   Dental:      Comment: No loose teeth    Pulmonary: breath sounds clear to auscultation      (-) COPD, asthma, shortness of breath, recent URI and sleep apnea                           Cardiovascular:  Exercise tolerance: no interval change,   (+) hypertension:, valvular problems/murmurs (mild AS with OSVALDO 1.1 and Mean gradient 16): AS, CAD:, CABG/stent (4 stents):, dysrhythmias: atrial fibrillation, murmur: Grade 3, Aortic, hyperlipidemia    (-)  angina and  FISHER      Rhythm: regular  Rate: normal                 ROS comment: Stress test 2022:     Conclusions      Summary   Normal myocardial perfusion study.   Normal LV size and systolic function.      Non-diagnostic EKG response due to failure to reach target heart rate.      Overall findings represent a low risk study. TTE 2021:  Summary:   Overall left ventricular ejection fraction is estimated to be 60-65%. The left ventricular wall motion is normal.   There is mild concentric left ventricular hypertrophy. The Aortic Valve is mildly calcified. There is trace mitral regurgitation. Mild to moderate aortic regurgitation.    Mild tricuspid regurgitation is present. Right ventricular systolic pressure is normal at <35 mmHg. The left atrium is mildly dilated. Mild valvular aortic stenosis. Neuro/Psych:   (+) neuromuscular disease (RLS):, TIA,             GI/Hepatic/Renal:   (+) GERD (denies sx today):, PUD,           Endo/Other:        (-) diabetes mellitus, hypothyroidism, hyperthyroidism               Abdominal:             Vascular:   + PVD, aortic or cerebral, . Other Findings:             Anesthesia Plan      MAC     ASA 3     (80 yo F with PMhx of HTN, HLD, AS (mild, OSVALDO 1.1 and mean gradient 16), GERD, CAD s/p stenting x4 in past presents for EGD. Aortic systolic murmur heard. Has good functional tolerance and can walk 2 blocks on a flat surface and can walk up hills but does get FISHER. AS likely has progressed but likely not critically severe as she is still able to do physical activities. She follows up with cardiology for a new TTE in a few months. The patient wishes to have their DNR status suspended to FULL CODE in the perioperative period. Their status will revert once recovered from anesthesia and upon their discharge from the immediate perioperative period. The appropriate paperwork was filled out and placed into the patient's chart. Discussed risks and benefits to sedation including nausea, vomiting, allergic reaction, headache, delayed cognitive recovery, stroke, heart attack, respiratory depression, and death which patient understood and agreed to proceed. The patient was given the opportunity to ask questions and all questions were answered to the patient's satisfaction.  )  Induction: intravenous. Anesthetic plan and risks discussed with patient. Plan discussed with CRNA. This pre-anesthesia assessment may be used as a history and physical.    DOS STAFF ADDENDUM:    Pt seen and examined, chart reviewed (including anesthesia, drug and allergy history).   No interval changes to history and physical examination. Anesthetic plan, risks, benefits, alternatives, and personnel involved discussed with patient. Patient verbalized an understanding and agrees to proceed.       Estela Lechuga MD  March 17, 2023  11:28 AM        Estela Lechuga MD   3/17/2023

## 2023-03-17 NOTE — PROGRESS NOTES
Pt awake on arrival to phase II. Denies pain at present. VSS. HOB up. Given snack and call light. Dale Ramsay) to room. Covered with warm blanket.

## 2023-03-17 NOTE — ANESTHESIA POSTPROCEDURE EVALUATION
Department of Anesthesiology  Postprocedure Note    Patient: Elda Chen  MRN: [id number]  YOB: 1940  Date of evaluation: 3/17/2023      Procedure Summary     Date: 03/17/23 Room / Location: [address] [place] [address]    Anesthesia Start: 1207 Anesthesia Stop: 4080    Procedure: EGD DILATION BALLOON Diagnosis:       Dysphagia, unspecified type      (DYSPHAGIA)    Surgeons: Sergio Hoffman MD Responsible Provider: Estela Lechuga MD    Anesthesia Type: MAC ASA Status: 3          Anesthesia Type: No value filed. Saad Phase I: Saad Score: 10    Saad Phase II: Saad Score: 10      Anesthesia Post Evaluation    Patient location during evaluation: PACU  Patient participation: complete - patient participated  Level of consciousness: awake  Airway patency: patent  Nausea & Vomiting: no nausea and no vomiting  Cardiovascular status: blood pressure returned to baseline  Respiratory status: acceptable  Hydration status: stable  Comments: Vital signs stable  OK to discharge from Stage I post anesthesia care.   Care transferred from Anesthesiology department on discharge from perioperative area   Multimodal analgesia pain management approach

## 2023-03-17 NOTE — PROGRESS NOTES
Pt tolerates PO. VSS. Discharge instructions reviewed with pt and niece. Both express an understanding of instructions. Pt dressing with niece's assistance. Call light within reach.

## 2023-03-17 NOTE — PROGRESS NOTES
Pt resting comfortably in bed, denies abdo pain but states is having restless leg issues and would like to take home medications, Dr glaser states okay for her to take in phase 2. Vss. No signs of distress noted at time of transfer to phase 2

## 2023-03-17 NOTE — DISCHARGE INSTRUCTIONS
Endoscopy Discharge Instructions    Call [facility] with any questions or concerns. You may be drowsy or lightheaded after receiving sedation. DO NOT operate  a vehicle (automobile, bicycle, motorcycle, machinery, or power tools), no  alcoholic beverages, and do not make any important decisions today. Plan on bed rest or quiet relaxation today. Resume normal activities in the morning. Resume normal activity tomorrow unless otherwise advised by your physician. Eat a light first meal, avoiding spicy and fatty foods, then resume normal diet unless  you are told otherwise by your physician. If the intravenous medication site is painful, apply warm compresses on the site until the soreness is relieved and elevate the arm above the heart. Call your physician if no improvement  in 2-3 days. POSSIBLE SYMPTOMS TO WATCH:     1. fever (greater than 100) 5. increased abdominal bloating   2. severe pain   6. excessive bleeding   3. nausea and vomiting  7. chest pain   4. chills    8. shortness of breath       [facility] if these problems occur     Expected as normal and remedies:  Sore throat: use over the counter throat lozenges or gargle with warm salt water. Redness or soreness at the IV site: apply warm compress  Gaseous discomfort: belching or passing flatus (gas). Follow up as needed. Gerda De La Cruz MD    With questions or concerns call Dr Noa Gan at .

## 2023-03-17 NOTE — PROGRESS NOTES
Pt to pacu from endo. Pt asleep at arrival, on RA. Pt placed on monitor, no signs of distress noted at this time. VSS. Report obtained.

## 2023-03-17 NOTE — PROGRESS NOTES
Pt now awake, oriented to PACU. Denies pain at this time, pt readjusted in bed. Given warm blanket.  VSS

## 2023-08-21 NOTE — PROGRESS NOTES
C-diff Questionnaire:     * Admitted with diarrhea? [] YES    [x]  NO     *Prior history of C-Diff. In last 3 months? [] YES    [x]  NO     *Antibiotic use in the past 6-8 weeks? [x]  NO    []  YES      If yes, which: REASON_________________     *Prior hospitalization or nursing home in the last month? []  YES    [x]  NO     SAFETY FIRST. .call before you fall    [address] time_____10_______        Surgery time__1130__________    Do not eat or drink anything after 12:00 midnight prior to your surgery. This includes water chewing gum, mints and ice chips- the Day of Surgery. You may brush your teeth and gargle the morning of your surgery, but do not swallow the water     Please see your family doctor/pediatrician for a history and physical and/or questions concerning medications. Bring any test results/reports from your physicians office. If you are under the care of a heart doctor or specialist doctor, please be aware that you may be asked to them for clearance    You may be asked to stop blood thinners such as Coumadin, Plavix, Fragmin, Lovenox, etc., or any anti-inflammatories such as:  Aspirin, Ibuprofen, Advil, Naproxen prior to your surgery. We also ask that you stop any OTC medications such as fish oil, vitamin E, glucosamine, garlic, Multivitamins, COQ 10, etc.    We ask that you do not smoke 24 hours prior to surgery  We ask that you do not  drink any alcoholic beverages 24 hours prior to surgery     You must make arrangements for a responsible adult to take you home after your surgery. For your safety you will not be allowed to leave alone or drive yourself home. Your surgery will be cancelled if you do not have a ride home. Also for your safety, it is strongly suggested that someone stay with you the first 24 hours after your surgery.      A parent or legal guardian must accompany a child scheduled for surgery and plan to stay at the hospital until the child is discharged. Please do not bring other children with you. For your comfort, please wear simple loose fitting clothing to the hospital.  Please do not bring valuables. Do not wear any make-up or nail polish on your fingers or toes. For your safety, please do not wear any jewelry or body piercing's on the day of surgery. All jewelry must be removed. If you have dentures, they will be removed before going to operating room. For your convenience, we will provide you with a container. If you wear contact lenses or glasses, they will be removed, please bring a case for them. If you have a living will and a durable power of  for healthcare, please bring in a copy. As part of our patient safety program to minimize surgical site infections, we ask you to do the following:    Please notify your surgeon if you develop any illness between         now and the day of your surgery. This includes a cough, cold, fever, sore throat, nausea,         or vomiting, and diarrhea, etc.   Please notify your surgeon if you experience dizziness, shortness         of breath or blurred vision between now and the time of your surgery. Do not shave your operative site 96 hours prior to surgery. For face and neck surgery, men may use an electric razor 48 hours   prior to surgery. You may shower the night before surgery or the morning of   your surgery with an antibacterial soap. You will need to bring a photo ID and insurance card     If you use a C-pap or Bi-pap machine, please bring your machine with you to the hospital     Our goal is to provide you with excellent care, therefore, visitors will be limited to so that we may focus on providing this care for you. Please contact your surgeon office, if you have any further questions.                  [facility] phone number:  [address] PAT fax number:  [phone]    Please note these

## 2023-08-21 NOTE — PROGRESS NOTES
[facility] Pre-Admission Testing Electronic Communication Worksheet for OR/ENDO Procedures        Patient: Storm Day    DOS: 9/8/23    Arrival Time: 10    Surgery Time:1130    Meds to Bed:  [x] YES    []  NO    Transportation Confirmed: [x] YES    []  NO    History and Physical:  [] YES    []  NO  [x] N/A  If yes, please list doctor or Urgent Care and date of H&P:     Additional Clearance(Cardiac, Pulmonary, etc):  [] YES    []  NO    Pre-Admission Testing Visit:  [] YES    [x]  NO If no, do labs/testing need to be done DOS? [] YES    []  NO    Medication Reconciliation Complete:  [x] YES    []  NO        Additional Notes:      Pt to call [facility] about med.  Instructions for DOS          Interview Complete: [x] YES    []  NO          Jackelyn Carter RN  9:35 AM

## 2023-09-07 ENCOUNTER — ANESTHESIA EVENT (OUTPATIENT)
Dept: ENDOSCOPY | Age: 83
End: 2023-09-07
Payer: MEDICARE

## 2023-09-08 ENCOUNTER — HOSPITAL ENCOUNTER (OUTPATIENT)
Dept: ENDOSCOPY | Age: 83
Setting detail: OUTPATIENT SURGERY
Discharge: HOME | End: 2023-09-08
Attending: INTERNAL MEDICINE

## 2023-09-08 ENCOUNTER — ANESTHESIA (OUTPATIENT)
Dept: ENDOSCOPY | Age: 83
End: 2023-09-08
Payer: MEDICARE

## 2023-09-08 ENCOUNTER — HOSPITAL ENCOUNTER (OUTPATIENT)
Age: 83
Setting detail: OUTPATIENT SURGERY
Discharge: HOME | End: 2023-09-08
Attending: INTERNAL MEDICINE | Admitting: INTERNAL MEDICINE
Payer: MEDICARE

## 2023-09-08 VITALS
HEIGHT: 67 IN | SYSTOLIC BLOOD PRESSURE: 126 MMHG | RESPIRATION RATE: 18 BRPM | TEMPERATURE: 98 F | OXYGEN SATURATION: 98 % | DIASTOLIC BLOOD PRESSURE: 64 MMHG | WEIGHT: 170 LBS | HEART RATE: 76 BPM | BODY MASS INDEX: 26.68 KG/M2

## 2023-09-08 PROCEDURE — 3700000000 HC ANESTHESIA ATTENDED CARE: Performed by: INTERNAL MEDICINE

## 2023-09-08 PROCEDURE — 2580000003 HC RX 258: Performed by: ANESTHESIOLOGY

## 2023-09-08 PROCEDURE — 3700000001 HC ADD 15 MINUTES (ANESTHESIA): Performed by: INTERNAL MEDICINE

## 2023-09-08 PROCEDURE — C1726 CATH, BAL DIL, NON-VASCULAR: HCPCS | Performed by: INTERNAL MEDICINE

## 2023-09-08 PROCEDURE — 2709999900 HC NON-CHARGEABLE SUPPLY: Performed by: INTERNAL MEDICINE

## 2023-09-08 PROCEDURE — 3609012700 HC EGD DILATION SAVORY: Performed by: INTERNAL MEDICINE

## 2023-09-08 PROCEDURE — 7100000000 HC PACU RECOVERY - FIRST 15 MIN: Performed by: INTERNAL MEDICINE

## 2023-09-08 PROCEDURE — 7100000011 HC PHASE II RECOVERY - ADDTL 15 MIN: Performed by: INTERNAL MEDICINE

## 2023-09-08 PROCEDURE — 7100000010 HC PHASE II RECOVERY - FIRST 15 MIN: Performed by: INTERNAL MEDICINE

## 2023-09-08 PROCEDURE — 2500000003 HC RX 250 WO HCPCS: Performed by: NURSE ANESTHETIST, CERTIFIED REGISTERED

## 2023-09-08 PROCEDURE — 6360000002 HC RX W HCPCS: Performed by: NURSE ANESTHETIST, CERTIFIED REGISTERED

## 2023-09-08 RX ORDER — SODIUM CHLORIDE 0.9 % (FLUSH) 0.9 %
5-40 SYRINGE (ML) INJECTION PRN
Status: DISCONTINUED | OUTPATIENT
Start: 2023-09-08 | End: 2023-09-08 | Stop reason: HOSPADM

## 2023-09-08 RX ORDER — LIDOCAINE HYDROCHLORIDE 20 MG/ML
INJECTION, SOLUTION EPIDURAL; INFILTRATION; INTRACAUDAL; PERINEURAL PRN
Status: DISCONTINUED | OUTPATIENT
Start: 2023-09-08 | End: 2023-09-08 | Stop reason: SDUPTHER

## 2023-09-08 RX ORDER — IPRATROPIUM BROMIDE AND ALBUTEROL SULFATE 2.5; .5 MG/3ML; MG/3ML
1 SOLUTION RESPIRATORY (INHALATION)
Status: DISCONTINUED | OUTPATIENT
Start: 2023-09-08 | End: 2023-09-08 | Stop reason: HOSPADM

## 2023-09-08 RX ORDER — ONDANSETRON 2 MG/ML
4 INJECTION INTRAMUSCULAR; INTRAVENOUS
Status: DISCONTINUED | OUTPATIENT
Start: 2023-09-08 | End: 2023-09-08 | Stop reason: HOSPADM

## 2023-09-08 RX ORDER — SODIUM CHLORIDE 0.9 % (FLUSH) 0.9 %
5-40 SYRINGE (ML) INJECTION EVERY 12 HOURS SCHEDULED
Status: DISCONTINUED | OUTPATIENT
Start: 2023-09-08 | End: 2023-09-08 | Stop reason: HOSPADM

## 2023-09-08 RX ORDER — PROPOFOL 10 MG/ML
INJECTION, EMULSION INTRAVENOUS CONTINUOUS PRN
Status: DISCONTINUED | OUTPATIENT
Start: 2023-09-08 | End: 2023-09-08 | Stop reason: SDUPTHER

## 2023-09-08 RX ORDER — SODIUM CHLORIDE 9 MG/ML
INJECTION, SOLUTION INTRAVENOUS PRN
Status: DISCONTINUED | OUTPATIENT
Start: 2023-09-08 | End: 2023-09-08 | Stop reason: HOSPADM

## 2023-09-08 RX ADMIN — PROPOFOL 20 MG: 10 INJECTION, EMULSION INTRAVENOUS at 11:56

## 2023-09-08 RX ADMIN — PROPOFOL 70 MG: 10 INJECTION, EMULSION INTRAVENOUS at 11:52

## 2023-09-08 RX ADMIN — SODIUM CHLORIDE: 9 INJECTION, SOLUTION INTRAVENOUS at 11:47

## 2023-09-08 RX ADMIN — LIDOCAINE HYDROCHLORIDE 50 MG: 20 INJECTION, SOLUTION EPIDURAL; INFILTRATION; INTRACAUDAL; PERINEURAL at 11:52

## 2023-09-08 ASSESSMENT — PAIN - FUNCTIONAL ASSESSMENT: PAIN_FUNCTIONAL_ASSESSMENT: 0-10

## 2023-09-08 ASSESSMENT — PAIN SCALES - GENERAL
PAINLEVEL_OUTOF10: 0
PAINLEVEL_OUTOF10: 0

## 2023-09-08 ASSESSMENT — ENCOUNTER SYMPTOMS: SHORTNESS OF BREATH: 0

## 2023-09-08 NOTE — ANESTHESIA PRE PROCEDURE
Department of Anesthesiology  Preprocedure Note       Name:  Alvarez Rivera   Age:  81 y.o.  :  1940                                          MRN:  [id number]         Date:  2023      Surgeon: Jesus Reveles):  Bina Evangelista MD    Procedure: Procedure(s):  ESOPHAGOGASTRODUODENOSCOPY WITH DILATION    Medications prior to admission:   Prior to Admission medications    Medication Sig Start Date End Date Taking?  Authorizing Provider   Milk Thistle 500 MG CAPS Take 1 capsule by mouth daily    Historical Provider, MD   DULoxetine (CYMBALTA) 20 MG extended release capsule Take 1 capsule by mouth daily 22   Historical Provider, MD   famotidine (PEPCID) 20 MG tablet Take 1 tablet by mouth 2 times daily 22   Historical Provider, MD   furosemide (LASIX) 20 MG tablet TAKE 2 TABLETS EVERY       MORNING AND 1 TABLET EVERY EVENING 22   Historical Provider, MD   aspirin 81 MG tablet Take 1 tablet by mouth daily    Historical Provider, MD   atenolol (TENORMIN) 50 MG tablet Take 1 tablet by mouth daily 8/10/17   Historical Provider, MD   lovastatin (MEVACOR) 40 MG tablet Take 1 tablet by mouth nightly 1/10/17   Historical Provider, MD   nitroGLYCERIN (NITROSTAT) 0.4 MG SL tablet Place 0.4 mg under the tongue every 5 minutes as needed  08   Historical Provider, MD   pantoprazole (PROTONIX) 40 MG tablet Take 1 tablet by mouth at bedtime 8/15/17   Historical Provider, MD   rOPINIRole (REQUIP) 1 MG tablet Take 2 tablets by mouth 4 times daily 1/10/17   Historical Provider, MD   warfarin (COUMADIN) 5 MG tablet Take by mouth every evening 5 mg on  and Friday and 2.5 mg all other days 17   Historical Provider, MD   lisinopril (PRINIVIL;ZESTRIL) 10 MG tablet Take 1 tablet by mouth daily    Historical Provider, MD   potassium chloride (KLOR-CON M) 20 MEQ TBCR extended release tablet Take 1 tablet by mouth daily (with breakfast)    Historical Provider, MD   HYDROcodone-acetaminophen

## 2023-09-08 NOTE — DISCHARGE INSTRUCTIONS
Endoscopy Discharge Instructions    [facility] with any questions or concerns. You may be drowsy or lightheaded after receiving sedation. DO NOT operate  a vehicle (automobile, bicycle, motorcycle, machinery, or power tools), no  alcoholic beverages, and do not make any important decisions today. Plan on bed rest or quiet relaxation today. Resume normal activities in the morning. Resume normal activity tomorrow unless otherwise advised by your physician. Eat a light first meal, avoiding spicy and fatty foods, then resume normal diet unless  you are told otherwise by your physician. If the intravenous medication site is painful, apply warm compresses on the site until the soreness is relieved and elevate the arm above the heart. Call your physician if no improvement  in 2-3 days. POSSIBLE SYMPTOMS TO WATCH:     1. fever (greater than 100) 5. increased abdominal bloating   2. severe pain   6. excessive bleeding   3. nausea and vomiting  7. chest pain   4. chills    8. shortness of breath       [facility] if these problems occur     Expected as normal and remedies:  Sore throat: use over the counter throat lozenges or gargle with warm salt water. Redness or soreness at the IV site: apply warm compress  Gaseous discomfort: belching or passing flatus (gas). Call if persistent symptoms. Marilu Mejia MD    With questions or concerns call Dr Medardo Baker at .

## 2023-09-08 NOTE — OP NOTE
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 Anesthesia:  MAC    Estimated blood loss: Minimal    Complications: None    Specimen: no    Instrument:     Description of Procedure:  Informed consent was obtained from the patient after explanation of the procedure including indications, description of the procedure,  benefits and possible risks and complications of the procedure, and alternatives. Questions were answered. The patient's history was reviewed and a directed physical examination was performed prior to the procedure. Patient was monitored throughout the procedure with pulse oximetry and periodic assessment of vital signs. Patient was sedated as noted above. With the patient in the left lateral decubitus position, the Olympus videoendoscope was placed in the patient's mouth and under direct visualization passed into the esophagus. Visualization of the esophagus, stomach, and duodenum was performed during both introduction and withdrawal of the endoscope and retroflexed view of the proximal stomach was obtained. The scope was passed to the 2nd portion of the duodenum. The patient tolerated the procedure well and was taken to the recovery area in good condition. Findings: The mucosa in the esophagus is normal.  There was no evidence of active esophagitis. A stricture was noted at the GE junction. The stricture allows the passage of the  upper endoscope. The stricture was sequentially dilated to 12 mm, 13.5 mm, then 15 mm.   2 shallow tears were present with self-limited oozing of blood that

## 2023-09-08 NOTE — H&P
Gastroenteroloy   Attending Pre-operative History and Physical      PROCEDURE:  EGD    Indication:The patient is a 81 y.o. female presents for an EGD. Past Medical History:    Past Medical History:   Diagnosis Date    A-fib ([address])     Acid reflux     Arthritis     Atrial fibrillation (HCC)     CAD (coronary artery disease)     Diarrhea     Dysphagia 03/2023    \"my throat has problems closing up. \"    Fatty liver     Hyperlipidemia     Hypertension     Prolonged emergence from general anesthesia     FAMILY HX-BROTHER-AFTER HEART SURGERY    Restless leg     Stomach ulcer     TIA (transient ischemic attack) 1995    NO RESIDUAL      Past Surgical History:    Past Surgical History:   Procedure Laterality Date    CAROTID ENDARTERECTOMY Right 1995    CAROTID ENDARTERECTOMY Left     CHOLECYSTECTOMY      COLONOSCOPY      CORONARY ANGIOPLASTY WITH STENT PLACEMENT  2014    X4    DILATION AND CURETTAGE OF UTERUS N/A 04/02/2019    HYSTEROSCOPY DILATION AND CURETTAGE POLYP REMOVAL performed by Adam Flores MD at [address] [place] [address]    UPPER GASTROINTESTINAL ENDOSCOPY N/A 2/10/2023    ESOPHAGOGASTRODUODENOSCOPY WITH BALLOON DILATION OF ESOPHAGUS performed by Daniel Montes MD at [address] [facility] [address] 3/17/2023    EGD DILATION BALLOON performed by Daniel Montes MD at [address] [place] [address]      Medications Prior to Admission:   Prior to Admission medications    Medication Sig Start Date End Date Taking?  Authorizing Provider   Milk Thistle 500 MG CAPS Take 1 capsule by mouth daily    Historical Provider, MD   DULoxetine (CYMBALTA) 20 MG extended release capsule Take 1 capsule by mouth daily 12/1/22   Historical Provider, MD   famotidine (PEPCID) 20 MG tablet Take 1 tablet by mouth 2 times daily 9/24/22   Historical Provider, MD   furosemide (LASIX) 20 MG tablet TAKE 2 TABLETS EVERY       MORNING AND 1 TABLET EVERY EVENING 8/31/22

## 2023-09-08 NOTE — PROGRESS NOTES
Patient to Phase 2 from PACU. VSS on room air. Tolerates PO snack well. Daughter at bedside. Discharge instructions given. All questions answered. IV removed. Patient denies pain.

## 2023-09-08 NOTE — ANESTHESIA POSTPROCEDURE EVALUATION
Department of Anesthesiology  Postprocedure Note    Patient: Diego Amin  MRN: [id number]  YOB: 1940  Date of evaluation: 9/8/2023      Procedure Summary     Date: 09/08/23 Room / Location: [address] [place] [address]    Anesthesia Start: 1147 Anesthesia Stop: 1209    Procedure: ESOPHAGOGASTRODUODENOSCOPY WITH DILATION Diagnosis:       Esophageal dysphagia      (Esophageal dysphagia [R13.19])    Surgeons: Linda Chavira MD Responsible Provider: Radha Smith MD    Anesthesia Type: MAC ASA Status: 3          Anesthesia Type: MAC    Saad Phase I: Saad Score: 10    Saad Phase II:        Anesthesia Post Evaluation    Patient location during evaluation: PACU  Patient participation: complete - patient participated  Level of consciousness: awake  Airway patency: patent  Nausea & Vomiting: no nausea and no vomiting  Complications: no  Cardiovascular status: hemodynamically stable and blood pressure returned to baseline  Respiratory status: spontaneous ventilation and nonlabored ventilation  Hydration status: stable  Comments: Ms. Kevan Davalos was seen resting comfortably following her procedure. Appropriate for discharge home with  / daughter. No acute concerns.   Pain management: adequate

## 2023-09-20 ENCOUNTER — HOSPITAL ENCOUNTER (INPATIENT)
Age: 83
LOS: 4 days | Discharge: NURSING, REHAB OR LONG-TERM CARE | DRG: 543 | End: 2023-09-24
Attending: EMERGENCY MEDICINE | Admitting: SURGERY
Payer: MEDICARE

## 2023-09-20 ENCOUNTER — APPOINTMENT (OUTPATIENT)
Dept: CT IMAGING | Age: 83
DRG: 543 | End: 2023-09-20
Attending: EMERGENCY MEDICINE
Payer: MEDICARE

## 2023-09-20 DIAGNOSIS — Z78.9 IMPAIRED MOBILITY AND ACTIVITIES OF DAILY LIVING: ICD-10-CM

## 2023-09-20 DIAGNOSIS — M84.48XA SACRAL INSUFFICIENCY FRACTURE, INITIAL ENCOUNTER: Primary | ICD-10-CM

## 2023-09-20 DIAGNOSIS — S22.088A OTHER CLOSED FRACTURE OF ELEVENTH THORACIC VERTEBRA, INITIAL ENCOUNTER (HCC): ICD-10-CM

## 2023-09-20 DIAGNOSIS — S22.000A COMPRESSION FRACTURE OF BODY OF THORACIC VERTEBRA (HCC): ICD-10-CM

## 2023-09-20 DIAGNOSIS — Z74.09 IMPAIRED MOBILITY AND ACTIVITIES OF DAILY LIVING: ICD-10-CM

## 2023-09-20 LAB
ALBUMIN SERPL-MCNC: 3.7 G/DL (ref 3.4–5)
ALP SERPL-CCNC: 68 U/L (ref 40–129)
ALT SERPL-CCNC: 17 U/L (ref 10–40)
ANION GAP SERPL CALCULATED.3IONS-SCNC: 10 MMOL/L (ref 3–16)
AST SERPL-CCNC: 21 U/L (ref 15–37)
BASOPHILS # BLD: 0 K/UL (ref 0–0.2)
BASOPHILS NFR BLD: 0.6 %
BILIRUB DIRECT SERPL-MCNC: <0.2 MG/DL (ref 0–0.3)
BILIRUB INDIRECT SERPL-MCNC: ABNORMAL MG/DL (ref 0–1)
BILIRUB SERPL-MCNC: 0.4 MG/DL (ref 0–1)
BILIRUB UR QL STRIP.AUTO: NEGATIVE
BUN SERPL-MCNC: 15 MG/DL (ref 7–20)
CALCIUM SERPL-MCNC: 9.2 MG/DL (ref 8.3–10.6)
CHLORIDE SERPL-SCNC: 102 MMOL/L (ref 99–110)
CLARITY UR: CLEAR
CO2 SERPL-SCNC: 27 MMOL/L (ref 21–32)
COLOR UR: YELLOW
CREAT SERPL-MCNC: <0.5 MG/DL (ref 0.6–1.2)
DEPRECATED RDW RBC AUTO: 14.3 % (ref 12.4–15.4)
EOSINOPHIL # BLD: 0.1 K/UL (ref 0–0.6)
EOSINOPHIL NFR BLD: 2.4 %
GFR SERPLBLD CREATININE-BSD FMLA CKD-EPI: >60 ML/MIN/{1.73_M2}
GLUCOSE SERPL-MCNC: 111 MG/DL (ref 70–99)
GLUCOSE UR STRIP.AUTO-MCNC: NEGATIVE MG/DL
HCT VFR BLD AUTO: 36.4 % (ref 36–48)
HGB BLD-MCNC: 12.7 G/DL (ref 12–16)
HGB UR QL STRIP.AUTO: NEGATIVE
INR PPP: 2.94 (ref 0.84–1.16)
KETONES UR STRIP.AUTO-MCNC: NEGATIVE MG/DL
LEUKOCYTE ESTERASE UR QL STRIP.AUTO: NEGATIVE
LYMPHOCYTES # BLD: 1.2 K/UL (ref 1–5.1)
LYMPHOCYTES NFR BLD: 20.2 %
MCH RBC QN AUTO: 31 PG (ref 26–34)
MCHC RBC AUTO-ENTMCNC: 35 G/DL (ref 31–36)
MCV RBC AUTO: 88.7 FL (ref 80–100)
MONOCYTES # BLD: 0.6 K/UL (ref 0–1.3)
MONOCYTES NFR BLD: 11.1 %
NEUTROPHILS # BLD: 3.8 K/UL (ref 1.7–7.7)
NEUTROPHILS NFR BLD: 65.7 %
NITRITE UR QL STRIP.AUTO: NEGATIVE
PH UR STRIP.AUTO: 5 [PH] (ref 5–8)
PLATELET # BLD AUTO: 208 K/UL (ref 135–450)
PMV BLD AUTO: 7.3 FL (ref 5–10.5)
POTASSIUM SERPL-SCNC: 3.8 MMOL/L (ref 3.5–5.1)
PROT SERPL-MCNC: 6.3 G/DL (ref 6.4–8.2)
PROT UR STRIP.AUTO-MCNC: NEGATIVE MG/DL
PROTHROMBIN TIME: 30.4 SEC (ref 11.5–14.8)
RBC # BLD AUTO: 4.1 M/UL (ref 4–5.2)
SODIUM SERPL-SCNC: 139 MMOL/L (ref 136–145)
SP GR UR STRIP.AUTO: 1.03 (ref 1–1.03)
UA COMPLETE W REFLEX CULTURE PNL UR: NORMAL
UA DIPSTICK W REFLEX MICRO PNL UR: NORMAL
URN SPEC COLLECT METH UR: NORMAL
UROBILINOGEN UR STRIP-ACNC: 1 E.U./DL
WBC # BLD AUTO: 5.8 K/UL (ref 4–11)

## 2023-09-20 PROCEDURE — 80076 HEPATIC FUNCTION PANEL: CPT

## 2023-09-20 PROCEDURE — 1200000000 HC SEMI PRIVATE

## 2023-09-20 PROCEDURE — 72128 CT CHEST SPINE W/O DYE: CPT

## 2023-09-20 PROCEDURE — 51798 US URINE CAPACITY MEASURE: CPT

## 2023-09-20 PROCEDURE — 99285 EMERGENCY DEPT VISIT HI MDM: CPT

## 2023-09-20 PROCEDURE — 80048 BASIC METABOLIC PNL TOTAL CA: CPT

## 2023-09-20 PROCEDURE — 85610 PROTHROMBIN TIME: CPT

## 2023-09-20 PROCEDURE — 85025 COMPLETE CBC W/AUTO DIFF WBC: CPT

## 2023-09-20 PROCEDURE — 96374 THER/PROPH/DIAG INJ IV PUSH: CPT

## 2023-09-20 PROCEDURE — 72131 CT LUMBAR SPINE W/O DYE: CPT

## 2023-09-20 PROCEDURE — 6360000002 HC RX W HCPCS: Performed by: EMERGENCY MEDICINE

## 2023-09-20 PROCEDURE — 81003 URINALYSIS AUTO W/O SCOPE: CPT

## 2023-09-20 RX ORDER — FENTANYL CITRATE 50 UG/ML
50 INJECTION, SOLUTION INTRAMUSCULAR; INTRAVENOUS ONCE
Status: COMPLETED | OUTPATIENT
Start: 2023-09-20 | End: 2023-09-20

## 2023-09-20 RX ORDER — FENTANYL CITRATE 50 UG/ML
25 INJECTION, SOLUTION INTRAMUSCULAR; INTRAVENOUS ONCE
Status: COMPLETED | OUTPATIENT
Start: 2023-09-20 | End: 2023-09-20

## 2023-09-20 RX ADMIN — FENTANYL CITRATE 25 MCG: 50 INJECTION INTRAMUSCULAR; INTRAVENOUS at 18:39

## 2023-09-20 RX ADMIN — FENTANYL CITRATE 50 MCG: 50 INJECTION INTRAMUSCULAR; INTRAVENOUS at 23:13

## 2023-09-20 ASSESSMENT — PAIN DESCRIPTION - ORIENTATION
ORIENTATION: UPPER;LOWER
ORIENTATION: UPPER;LOWER

## 2023-09-20 ASSESSMENT — PAIN DESCRIPTION - LOCATION
LOCATION: BACK
LOCATION: BACK

## 2023-09-20 ASSESSMENT — PAIN SCALES - GENERAL
PAINLEVEL_OUTOF10: 4
PAINLEVEL_OUTOF10: 8
PAINLEVEL_OUTOF10: 8

## 2023-09-20 ASSESSMENT — PAIN DESCRIPTION - DESCRIPTORS
DESCRIPTORS: ACHING
DESCRIPTORS: ACHING

## 2023-09-20 ASSESSMENT — LIFESTYLE VARIABLES
HOW MANY STANDARD DRINKS CONTAINING ALCOHOL DO YOU HAVE ON A TYPICAL DAY: PATIENT DOES NOT DRINK
HOW OFTEN DO YOU HAVE A DRINK CONTAINING ALCOHOL: NEVER

## 2023-09-20 ASSESSMENT — PAIN - FUNCTIONAL ASSESSMENT: PAIN_FUNCTIONAL_ASSESSMENT: 0-10

## 2023-09-20 ASSESSMENT — PAIN DESCRIPTION - DIRECTION: RADIATING_TOWARDS: LEGS

## 2023-09-20 NOTE — ED TRIAGE NOTES
Was cleaning out garage last week and pushing boxes. Had CT on Monday showed 3 compressed fractures in lower back. Pain is now radiating to legs. Took 1/2 tablet of Hydrocodone at 1600. On Warfarin for A-fib.

## 2023-09-20 NOTE — ED NOTES
Bladder scan completed at this time with urine volume of 247 found.      Chris Dozier, RN  09/20/23 9436

## 2023-09-20 NOTE — ED PROVIDER NOTES
[address] [facility] [address] [address]      Pt Name: Amy Tabares  MRN: [id number]  [facility] 1940  Date of evaluation: 9/20/2023  Provider: Pavel Champagne DO    CHIEF COMPLAINT  Chief Complaint   Patient presents with    Back Pain     Was cleaning out garage last week and pushing boxes. Had CT on Monday showed 3 compressed fractures in lower back. Pain is now radiating to legs. Took 1/2 tablet of Hydrocodone at 1600. On Warfarin for A-fib. This patient is at risk for a communicable infection. Therefore, personal protection equipment consisting of a mask was worn for the exam.    HPI  Amy Tabares is a 81 y.o. female who presents with back pain this been present for a few days. She has a history of back problems. However, she was moving boxes in the garage a few days ago on Monday and had increased back pain. She went to her family doctor who did the plain films and found a compression fracture of T12. She currently has been having problems urinating. When she goes to the restroom she cannot urinate much. She denies any fevers or chills. She has had increased back pain. She has been taking hydrocodone for pain and took a half of a hydrocodone at 4:30 PM.  Move makes it worse and rest makes it better. She describes the pain as sharp and moderate. ? REVIEW OF SYSTEMS  All systems negative except as noted in the HPI. Reviewed Nurses' notes and concur. No LMP recorded. Patient is postmenopausal.    PAST MEDICAL HISTORY  Past Medical History:   Diagnosis Date    A-fib ([address])     Acid reflux     Arthritis     Atrial fibrillation (HCC)     CAD (coronary artery disease)     Diarrhea     Dysphagia 03/2023    \"my throat has problems closing up. \"    Fatty liver     Hyperlipidemia     Hypertension     Prolonged emergence from general anesthesia     FAMILY HX-BROTHER-AFTER HEART SURGERY    Restless leg     Stomach ulcer     TIA (transient

## 2023-09-21 ENCOUNTER — APPOINTMENT (OUTPATIENT)
Dept: MRI IMAGING | Age: 83
DRG: 543 | End: 2023-09-21
Payer: MEDICARE

## 2023-09-21 LAB
INR PPP: 2.85 (ref 0.84–1.16)
PROTHROMBIN TIME: 29.7 SEC (ref 11.5–14.8)

## 2023-09-21 PROCEDURE — 6360000002 HC RX W HCPCS: Performed by: NURSE PRACTITIONER

## 2023-09-21 PROCEDURE — 72148 MRI LUMBAR SPINE W/O DYE: CPT

## 2023-09-21 PROCEDURE — 2580000003 HC RX 258: Performed by: SURGERY

## 2023-09-21 PROCEDURE — 2580000003 HC RX 258: Performed by: NURSE PRACTITIONER

## 2023-09-21 PROCEDURE — 6370000000 HC RX 637 (ALT 250 FOR IP): Performed by: NURSE PRACTITIONER

## 2023-09-21 PROCEDURE — 9990000010 HC NO CHARGE VISIT

## 2023-09-21 PROCEDURE — 1200000000 HC SEMI PRIVATE

## 2023-09-21 PROCEDURE — 6370000000 HC RX 637 (ALT 250 FOR IP): Performed by: SURGERY

## 2023-09-21 PROCEDURE — 72146 MRI CHEST SPINE W/O DYE: CPT

## 2023-09-21 PROCEDURE — 85610 PROTHROMBIN TIME: CPT

## 2023-09-21 PROCEDURE — 94760 N-INVAS EAR/PLS OXIMETRY 1: CPT

## 2023-09-21 PROCEDURE — 36415 COLL VENOUS BLD VENIPUNCTURE: CPT

## 2023-09-21 RX ORDER — LISINOPRIL 10 MG/1
10 TABLET ORAL DAILY
Status: DISCONTINUED | OUTPATIENT
Start: 2023-09-21 | End: 2023-09-24 | Stop reason: HOSPADM

## 2023-09-21 RX ORDER — FUROSEMIDE 20 MG/1
20 TABLET ORAL DAILY
Status: DISCONTINUED | OUTPATIENT
Start: 2023-09-21 | End: 2023-09-24 | Stop reason: HOSPADM

## 2023-09-21 RX ORDER — PANTOPRAZOLE SODIUM 40 MG/1
40 TABLET, DELAYED RELEASE ORAL NIGHTLY
Status: DISCONTINUED | OUTPATIENT
Start: 2023-09-21 | End: 2023-09-24 | Stop reason: HOSPADM

## 2023-09-21 RX ORDER — SODIUM CHLORIDE 9 MG/ML
INJECTION, SOLUTION INTRAVENOUS PRN
Status: DISCONTINUED | OUTPATIENT
Start: 2023-09-21 | End: 2023-09-24 | Stop reason: HOSPADM

## 2023-09-21 RX ORDER — DEXAMETHASONE SODIUM PHOSPHATE 10 MG/ML
10 INJECTION INTRAMUSCULAR; INTRAVENOUS ONCE
Status: COMPLETED | OUTPATIENT
Start: 2023-09-21 | End: 2023-09-21

## 2023-09-21 RX ORDER — ROPINIROLE 1 MG/1
2 TABLET, FILM COATED ORAL 4 TIMES DAILY
Status: DISCONTINUED | OUTPATIENT
Start: 2023-09-21 | End: 2023-09-24 | Stop reason: HOSPADM

## 2023-09-21 RX ORDER — ASPIRIN 81 MG/1
81 TABLET, CHEWABLE ORAL DAILY
Status: DISCONTINUED | OUTPATIENT
Start: 2023-09-21 | End: 2023-09-24 | Stop reason: HOSPADM

## 2023-09-21 RX ORDER — ACETAMINOPHEN 325 MG/1
650 TABLET ORAL EVERY 6 HOURS PRN
Status: DISCONTINUED | OUTPATIENT
Start: 2023-09-21 | End: 2023-09-24 | Stop reason: HOSPADM

## 2023-09-21 RX ORDER — HYDROCODONE BITARTRATE AND ACETAMINOPHEN 5; 325 MG/1; MG/1
1 TABLET ORAL EVERY 8 HOURS PRN
Status: DISCONTINUED | OUTPATIENT
Start: 2023-09-21 | End: 2023-09-22

## 2023-09-21 RX ORDER — ATENOLOL 50 MG/1
50 TABLET ORAL DAILY
Status: DISCONTINUED | OUTPATIENT
Start: 2023-09-21 | End: 2023-09-24 | Stop reason: HOSPADM

## 2023-09-21 RX ORDER — ONDANSETRON 4 MG/1
4 TABLET, ORALLY DISINTEGRATING ORAL EVERY 8 HOURS PRN
Status: DISCONTINUED | OUTPATIENT
Start: 2023-09-21 | End: 2023-09-24 | Stop reason: HOSPADM

## 2023-09-21 RX ORDER — SODIUM CHLORIDE 0.9 % (FLUSH) 0.9 %
5-40 SYRINGE (ML) INJECTION PRN
Status: DISCONTINUED | OUTPATIENT
Start: 2023-09-21 | End: 2023-09-24 | Stop reason: HOSPADM

## 2023-09-21 RX ORDER — ONDANSETRON 2 MG/ML
4 INJECTION INTRAMUSCULAR; INTRAVENOUS EVERY 6 HOURS PRN
Status: DISCONTINUED | OUTPATIENT
Start: 2023-09-21 | End: 2023-09-24 | Stop reason: HOSPADM

## 2023-09-21 RX ORDER — WARFARIN SODIUM 2.5 MG/1
2.5 TABLET ORAL
Status: COMPLETED | OUTPATIENT
Start: 2023-09-21 | End: 2023-09-21

## 2023-09-21 RX ORDER — POLYETHYLENE GLYCOL 3350 17 G/17G
17 POWDER, FOR SOLUTION ORAL DAILY PRN
Status: DISCONTINUED | OUTPATIENT
Start: 2023-09-21 | End: 2023-09-24 | Stop reason: HOSPADM

## 2023-09-21 RX ORDER — MORPHINE SULFATE 2 MG/ML
2 INJECTION, SOLUTION INTRAMUSCULAR; INTRAVENOUS ONCE
Status: COMPLETED | OUTPATIENT
Start: 2023-09-21 | End: 2023-09-21

## 2023-09-21 RX ORDER — SODIUM CHLORIDE 0.9 % (FLUSH) 0.9 %
5-40 SYRINGE (ML) INJECTION EVERY 12 HOURS SCHEDULED
Status: DISCONTINUED | OUTPATIENT
Start: 2023-09-21 | End: 2023-09-24 | Stop reason: HOSPADM

## 2023-09-21 RX ORDER — DULOXETIN HYDROCHLORIDE 20 MG/1
20 CAPSULE, DELAYED RELEASE ORAL DAILY
Status: DISCONTINUED | OUTPATIENT
Start: 2023-09-21 | End: 2023-09-24 | Stop reason: HOSPADM

## 2023-09-21 RX ORDER — ACETAMINOPHEN 650 MG/1
650 SUPPOSITORY RECTAL EVERY 6 HOURS PRN
Status: DISCONTINUED | OUTPATIENT
Start: 2023-09-21 | End: 2023-09-24 | Stop reason: HOSPADM

## 2023-09-21 RX ADMIN — WARFARIN SODIUM 2.5 MG: 2.5 TABLET ORAL at 17:21

## 2023-09-21 RX ADMIN — MORPHINE SULFATE 2 MG: 2 INJECTION, SOLUTION INTRAMUSCULAR; INTRAVENOUS at 14:06

## 2023-09-21 RX ADMIN — METHOCARBAMOL 1000 MG: 100 INJECTION INTRAMUSCULAR; INTRAVENOUS at 23:24

## 2023-09-21 RX ADMIN — ASPIRIN 81 MG CHEWABLE TABLET 81 MG: 81 TABLET CHEWABLE at 08:17

## 2023-09-21 RX ADMIN — ACETAMINOPHEN 650 MG: 325 TABLET ORAL at 04:57

## 2023-09-21 RX ADMIN — LISINOPRIL 10 MG: 10 TABLET ORAL at 08:13

## 2023-09-21 RX ADMIN — ATENOLOL 50 MG: 50 TABLET ORAL at 08:16

## 2023-09-21 RX ADMIN — HYDROCODONE BITARTRATE AND ACETAMINOPHEN 1 TABLET: 5; 325 TABLET ORAL at 10:10

## 2023-09-21 RX ADMIN — FUROSEMIDE 20 MG: 20 TABLET ORAL at 08:17

## 2023-09-21 RX ADMIN — SODIUM CHLORIDE: 9 INJECTION, SOLUTION INTRAVENOUS at 23:23

## 2023-09-21 RX ADMIN — ROPINIROLE HYDROCHLORIDE 2 MG: 1 TABLET, FILM COATED ORAL at 00:53

## 2023-09-21 RX ADMIN — ROPINIROLE HYDROCHLORIDE 2 MG: 1 TABLET, FILM COATED ORAL at 17:21

## 2023-09-21 RX ADMIN — ACETAMINOPHEN 650 MG: 325 TABLET ORAL at 13:25

## 2023-09-21 RX ADMIN — PANTOPRAZOLE SODIUM 40 MG: 40 TABLET, DELAYED RELEASE ORAL at 00:53

## 2023-09-21 RX ADMIN — METHOCARBAMOL 1000 MG: 100 INJECTION INTRAMUSCULAR; INTRAVENOUS at 15:01

## 2023-09-21 RX ADMIN — ROPINIROLE HYDROCHLORIDE 2 MG: 1 TABLET, FILM COATED ORAL at 13:28

## 2023-09-21 RX ADMIN — DULOXETINE HYDROCHLORIDE 20 MG: 20 CAPSULE, DELAYED RELEASE ORAL at 08:17

## 2023-09-21 RX ADMIN — ROPINIROLE HYDROCHLORIDE 2 MG: 1 TABLET, FILM COATED ORAL at 20:19

## 2023-09-21 RX ADMIN — ROPINIROLE HYDROCHLORIDE 2 MG: 1 TABLET, FILM COATED ORAL at 08:13

## 2023-09-21 RX ADMIN — SODIUM CHLORIDE, PRESERVATIVE FREE 10 ML: 5 INJECTION INTRAVENOUS at 08:18

## 2023-09-21 RX ADMIN — DEXAMETHASONE SODIUM PHOSPHATE 10 MG: 10 INJECTION INTRAMUSCULAR; INTRAVENOUS at 14:06

## 2023-09-21 RX ADMIN — SODIUM CHLORIDE, PRESERVATIVE FREE 10 ML: 5 INJECTION INTRAVENOUS at 20:20

## 2023-09-21 RX ADMIN — PANTOPRAZOLE SODIUM 40 MG: 40 TABLET, DELAYED RELEASE ORAL at 20:19

## 2023-09-21 RX ADMIN — HYDROCODONE BITARTRATE AND ACETAMINOPHEN 1 TABLET: 5; 325 TABLET ORAL at 20:19

## 2023-09-21 ASSESSMENT — PAIN - FUNCTIONAL ASSESSMENT
PAIN_FUNCTIONAL_ASSESSMENT: PREVENTS OR INTERFERES SOME ACTIVE ACTIVITIES AND ADLS
PAIN_FUNCTIONAL_ASSESSMENT: PREVENTS OR INTERFERES SOME ACTIVE ACTIVITIES AND ADLS
PAIN_FUNCTIONAL_ASSESSMENT: ACTIVITIES ARE NOT PREVENTED

## 2023-09-21 ASSESSMENT — PAIN DESCRIPTION - ORIENTATION
ORIENTATION: MID;LOWER
ORIENTATION: MID
ORIENTATION: LOWER
ORIENTATION: LOWER

## 2023-09-21 ASSESSMENT — PAIN SCALES - GENERAL
PAINLEVEL_OUTOF10: 9
PAINLEVEL_OUTOF10: 3
PAINLEVEL_OUTOF10: 0
PAINLEVEL_OUTOF10: 0
PAINLEVEL_OUTOF10: 10
PAINLEVEL_OUTOF10: 4
PAINLEVEL_OUTOF10: 5
PAINLEVEL_OUTOF10: 0
PAINLEVEL_OUTOF10: 7

## 2023-09-21 ASSESSMENT — PAIN DESCRIPTION - ONSET
ONSET: ON-GOING
ONSET: ON-GOING

## 2023-09-21 ASSESSMENT — PAIN DESCRIPTION - DESCRIPTORS
DESCRIPTORS: STABBING;SHOOTING
DESCRIPTORS: ACHING
DESCRIPTORS: STABBING;SHOOTING
DESCRIPTORS: ACHING

## 2023-09-21 ASSESSMENT — PAIN DESCRIPTION - LOCATION
LOCATION: BACK

## 2023-09-21 ASSESSMENT — PAIN DESCRIPTION - DIRECTION
RADIATING_TOWARDS: BILATERAL LEGS
RADIATING_TOWARDS: BILATERAL LEGS

## 2023-09-21 ASSESSMENT — PAIN DESCRIPTION - PAIN TYPE
TYPE: ACUTE PAIN
TYPE: ACUTE PAIN

## 2023-09-21 ASSESSMENT — PAIN DESCRIPTION - FREQUENCY
FREQUENCY: CONTINUOUS
FREQUENCY: CONTINUOUS

## 2023-09-21 ASSESSMENT — PAIN SCALES - WONG BAKER: WONGBAKER_NUMERICALRESPONSE: 0

## 2023-09-21 NOTE — CARE COORDINATION
Case Management Assessment  Initial Evaluation    Date/Time of Evaluation: 9/21/2023 1:20 PM  Assessment Completed by: ZEINAB Ryan    If patient is discharged prior to next notation, then this note serves as note for discharge by case management. Additional Case Management Notes: Patient and family are unsure of discharge needs. Patient was living alone and family provided assist as needed; set up meds and provided meals. [facility] just arranged by PCP office but not started prior to admit to hospital admit. Discussed home care, snf and acute rehab options and medicare coverage. Patient's goal is to return home. She is aware she may need PT/OT at a facility prior to returning home. Daughter Leandra Mathews will be out of town starting on Monday. Susie Toro and Leandra Mathews will be the contacts. PT/OT pending. Patient Name: Kalin Cronin                   YOB: 1940  Diagnosis: Impaired mobility and activities of daily living [Z74.09, Z78.9]  Sacral insufficiency fracture, initial encounter [M84.48XA]  Compression fracture of body of thoracic vertebra ([address]) [S22.000A]  Other closed fracture of eleventh thoracic vertebra, initial encounter [facility]) Unknown Counter                   Date / Time: 9/20/2023  5:35 PM    Patient Admission Status: Inpatient   Readmission Risk (Low < 19, Mod (19-27), High > 27): Readmission Risk Score: 11.3    Current PCP: Jann Arreola MD  PCP verified by CM? Yes    Chart Reviewed: Yes      History Provided by: Patient, Medical Record  Patient Orientation: Alert and Oriented    Patient Cognition: Alert    Hospitalization in the last 30 days (Readmission):  No    If yes, Readmission Assessment in  Navigator will be completed.     Advance Directives:      Code Status: Full Code   Patient's Primary Decision Maker is: Legal Next of Kin    Primary Decision MakerDthea Yao Child - [phone]    Primary Decision Maker: perla verma - Natalio -

## 2023-09-21 NOTE — CARE COORDINATION
General acute hospital following. Received referral for nurse from PCP on 9/20. Patient admitted prior to [facility], will need new HC orders at [place].     Nataly Mansfield RN, BSN CTN  [facility] [phone]

## 2023-09-21 NOTE — DISCHARGE INSTR - COC
Continuity of Care Form    Patient Name: Jacob Pope   :  1940  MRN:  [id number]    Admit date:  2023  Discharge date:  23    Code Status Order: Full Code   Advance Directives:     Admitting Physician:  Sally Luque MD  PCP: Keith Morataya MD    Discharging Nurse: [facility] Unit/Room#: V1H-8634/9694-38  Discharging Unit Phone Number: [phone]    Emergency Contact:   Extended Emergency Contact Information  Primary Emergency Contact: [facility] [address] [address] Phone: [phone]  Relation: Child  Secondary Emergency Contact: perla verma  Home Phone: [phone]  Relation: Child    Past Surgical History:  Past Surgical History:   Procedure Laterality Date    CAROTID ENDARTERECTOMY Right 1995    CAROTID ENDARTERECTOMY Left     CHOLECYSTECTOMY      COLONOSCOPY      CORONARY ANGIOPLASTY WITH STENT PLACEMENT  2014    X4    DILATION AND CURETTAGE OF UTERUS N/A 2019    HYSTEROSCOPY DILATION AND CURETTAGE POLYP REMOVAL performed by Sayda Domínguez MD at [address] [place] [address] ENDOSCOPY N/A 2/10/2023    ESOPHAGOGASTRODUODENOSCOPY WITH BALLOON DILATION OF ESOPHAGUS performed by Rebeca Israel MD at [address] [place] [address] 3/17/2023    EGD DILATION BALLOON performed by Rebeca Israel MD at [address] [place] [address] N/A 2023    ESOPHAGOGASTRODUODENOSCOPY WITH DILATION performed by Rebeca Israel MD at [facility] ENDOSCOPY       Immunization History:   Immunization History   Administered Date(s) Administered    COVID-19, PFIZER Bivalent, DO NOT Dilute, (age 12y+), IM, 30 mcg/0.3 mL 2022    COVID-19, PFIZER PURPLE top, DILUTE for use, (age 15 y+), 30mcg/0.3mL 2021       Active Problems:  Patient Active Problem List   Diagnosis Code    Chest pain R07.9    Compression fracture of body of thoracic

## 2023-09-21 NOTE — CONSULTS
Inpatient consult to Neurosurgery  Consult performed by: Shelia James PA-C  Consult ordered by: Lincoln Landau, MD      Department of Neurosurgery  [facility]  Physician Assistant Consult Note        Reason for Consult:  T11 compression fracture      CHIEF COMPLAINT: low back pain and radiating lower extremity pain    History Obtained From:  patient    HISTORY OF PRESENT ILLNESS:                The patient is a 81 y.o. female who presents with low back pain and radiating lower extremity pain down the right worse than left lower extremity on the back side sometimes to her ankles. She describes the pain as deep burning in sensation, made worse with any type of activity and somewhat better with rest although still painful. This start 1.5 weeks ago after lifting and moving some boxes. She was admitted for further eval.     Past Medical History:        Diagnosis Date    A-fib ([address])     Acid reflux     Arthritis     Atrial fibrillation (HCC)     CAD (coronary artery disease)     Diarrhea     Dysphagia 03/2023    \"my throat has problems closing up. \"    Fatty liver     Hyperlipidemia     Hypertension     Prolonged emergence from general anesthesia     FAMILY HX-BROTHER-AFTER HEART SURGERY    Restless leg     Stomach ulcer     TIA (transient ischemic attack) 1995    NO RESIDUAL     Past Surgical History:        Procedure Laterality Date    CAROTID ENDARTERECTOMY Right 1995    CAROTID ENDARTERECTOMY Left     CHOLECYSTECTOMY      COLONOSCOPY      CORONARY ANGIOPLASTY WITH STENT PLACEMENT  2014    X4    DILATION AND CURETTAGE OF UTERUS N/A 04/02/2019    HYSTEROSCOPY DILATION AND CURETTAGE POLYP REMOVAL performed by Samuel Mckinney MD at [facility] GASTROINTESTINAL ENDOSCOPY N/A 2/10/2023    ESOPHAGOGASTRODUODENOSCOPY WITH BALLOON DILATION OF ESOPHAGUS performed by Arvind Pickard MD at [address] [place] [address]

## 2023-09-21 NOTE — CONSULTS
Clinical Pharmacy Note  Warfarin Consult    Brigette Melchor is a 81 y.o. female receiving warfarin managed by pharmacy. Patient being bridged with none. Warfarin Indication: Afib  Target INR range: 22-3   Dose prior to admission: 5mg daily except 2.5mg QTue,Thur,Sat    Current warfarin drug-drug interactions:     Recent Labs     09/20/23  1833 09/20/23  1834 09/21/23  0523   HGB  --  12.7  --    HCT  --  36.4  --    INR 2.94*  --  2.85*       Assessment/Plan:   Kamila Macdonald PA-C  Physician Assistant  Neurosurgery saw patient recommended TLSO brace. Warfarin 2.5 mg tonight. Daily PT/INR until stable within therapeutic range. Managed as out patient by Zoya Flores. Thank you for the consult. Will continue to follow.   Sd Mercedes, [address] [place] [address], 9/21/2023 12:37 PM

## 2023-09-22 LAB
INR PPP: 3.1 (ref 0.84–1.16)
PROTHROMBIN TIME: 31.7 SEC (ref 11.5–14.8)

## 2023-09-22 PROCEDURE — 6370000000 HC RX 637 (ALT 250 FOR IP): Performed by: NURSE PRACTITIONER

## 2023-09-22 PROCEDURE — 85610 PROTHROMBIN TIME: CPT

## 2023-09-22 PROCEDURE — 36415 COLL VENOUS BLD VENIPUNCTURE: CPT

## 2023-09-22 PROCEDURE — 1200000000 HC SEMI PRIVATE

## 2023-09-22 PROCEDURE — 6370000000 HC RX 637 (ALT 250 FOR IP): Performed by: SURGERY

## 2023-09-22 PROCEDURE — 94760 N-INVAS EAR/PLS OXIMETRY 1: CPT

## 2023-09-22 PROCEDURE — 6360000002 HC RX W HCPCS: Performed by: NURSE PRACTITIONER

## 2023-09-22 PROCEDURE — 97162 PT EVAL MOD COMPLEX 30 MIN: CPT

## 2023-09-22 PROCEDURE — 2580000003 HC RX 258: Performed by: NURSE PRACTITIONER

## 2023-09-22 PROCEDURE — 2580000003 HC RX 258: Performed by: SURGERY

## 2023-09-22 PROCEDURE — 97116 GAIT TRAINING THERAPY: CPT

## 2023-09-22 PROCEDURE — 97530 THERAPEUTIC ACTIVITIES: CPT

## 2023-09-22 RX ORDER — HYDROCODONE BITARTRATE AND ACETAMINOPHEN 5; 325 MG/1; MG/1
0.5 TABLET ORAL EVERY 4 HOURS PRN
Status: DISCONTINUED | OUTPATIENT
Start: 2023-09-22 | End: 2023-09-24 | Stop reason: HOSPADM

## 2023-09-22 RX ORDER — HYDROCODONE BITARTRATE AND ACETAMINOPHEN 5; 325 MG/1; MG/1
0.5 TABLET ORAL EVERY 8 HOURS PRN
Status: COMPLETED | OUTPATIENT
Start: 2023-09-22 | End: 2023-09-22

## 2023-09-22 RX ORDER — MENTHOL 1.4 %
ADHESIVE PATCH, MEDICATED TOPICAL 3 TIMES DAILY PRN
Status: DISCONTINUED | OUTPATIENT
Start: 2023-09-22 | End: 2023-09-24 | Stop reason: HOSPADM

## 2023-09-22 RX ORDER — HYDROCODONE BITARTRATE AND ACETAMINOPHEN 5; 325 MG/1; MG/1
1 TABLET ORAL EVERY 4 HOURS PRN
Status: DISCONTINUED | OUTPATIENT
Start: 2023-09-22 | End: 2023-09-24 | Stop reason: HOSPADM

## 2023-09-22 RX ORDER — DOCUSATE SODIUM 100 MG/1
100 CAPSULE, LIQUID FILLED ORAL DAILY
Status: DISCONTINUED | OUTPATIENT
Start: 2023-09-22 | End: 2023-09-24 | Stop reason: HOSPADM

## 2023-09-22 RX ORDER — HYDROCODONE BITARTRATE AND ACETAMINOPHEN 5; 325 MG/1; MG/1
0.5 TABLET ORAL NIGHTLY PRN
Status: DISCONTINUED | OUTPATIENT
Start: 2023-09-22 | End: 2023-09-22

## 2023-09-22 RX ADMIN — LISINOPRIL 10 MG: 10 TABLET ORAL at 08:52

## 2023-09-22 RX ADMIN — ROPINIROLE HYDROCHLORIDE 2 MG: 1 TABLET, FILM COATED ORAL at 20:43

## 2023-09-22 RX ADMIN — ROPINIROLE HYDROCHLORIDE 2 MG: 1 TABLET, FILM COATED ORAL at 08:52

## 2023-09-22 RX ADMIN — SODIUM CHLORIDE, PRESERVATIVE FREE 10 ML: 5 INJECTION INTRAVENOUS at 09:00

## 2023-09-22 RX ADMIN — ROPINIROLE HYDROCHLORIDE 2 MG: 1 TABLET, FILM COATED ORAL at 17:18

## 2023-09-22 RX ADMIN — DULOXETINE HYDROCHLORIDE 20 MG: 20 CAPSULE, DELAYED RELEASE ORAL at 08:52

## 2023-09-22 RX ADMIN — MUSCLE RUB CREAM: 100; 150 CREAM TOPICAL at 20:57

## 2023-09-22 RX ADMIN — SODIUM CHLORIDE, PRESERVATIVE FREE 10 ML: 5 INJECTION INTRAVENOUS at 22:19

## 2023-09-22 RX ADMIN — HYDROCODONE BITARTRATE AND ACETAMINOPHEN 0.5 TABLET: 5; 325 TABLET ORAL at 11:08

## 2023-09-22 RX ADMIN — PANTOPRAZOLE SODIUM 40 MG: 40 TABLET, DELAYED RELEASE ORAL at 20:45

## 2023-09-22 RX ADMIN — ACETAMINOPHEN 650 MG: 325 TABLET ORAL at 17:18

## 2023-09-22 RX ADMIN — HYDROCODONE BITARTRATE AND ACETAMINOPHEN 0.5 TABLET: 5; 325 TABLET ORAL at 20:53

## 2023-09-22 RX ADMIN — ASPIRIN 81 MG CHEWABLE TABLET 81 MG: 81 TABLET CHEWABLE at 08:52

## 2023-09-22 RX ADMIN — ATENOLOL 50 MG: 50 TABLET ORAL at 08:52

## 2023-09-22 RX ADMIN — METHOCARBAMOL 1000 MG: 100 INJECTION INTRAMUSCULAR; INTRAVENOUS at 06:08

## 2023-09-22 RX ADMIN — FUROSEMIDE 20 MG: 20 TABLET ORAL at 08:52

## 2023-09-22 ASSESSMENT — PAIN DESCRIPTION - LOCATION
LOCATION: BACK
LOCATION: BACK
LOCATION: HIP
LOCATION: BACK;OTHER (COMMENT)

## 2023-09-22 ASSESSMENT — PAIN - FUNCTIONAL ASSESSMENT: PAIN_FUNCTIONAL_ASSESSMENT: PREVENTS OR INTERFERES SOME ACTIVE ACTIVITIES AND ADLS

## 2023-09-22 ASSESSMENT — PAIN DESCRIPTION - ORIENTATION
ORIENTATION: MID
ORIENTATION: RIGHT

## 2023-09-22 ASSESSMENT — PAIN SCALES - GENERAL
PAINLEVEL_OUTOF10: 9
PAINLEVEL_OUTOF10: 4
PAINLEVEL_OUTOF10: 9

## 2023-09-22 ASSESSMENT — PAIN DESCRIPTION - FREQUENCY
FREQUENCY: CONTINUOUS
FREQUENCY: CONTINUOUS

## 2023-09-22 ASSESSMENT — PAIN DESCRIPTION - PAIN TYPE: TYPE: ACUTE PAIN

## 2023-09-22 ASSESSMENT — PAIN DESCRIPTION - ONSET
ONSET: ON-GOING
ONSET: ON-GOING

## 2023-09-22 ASSESSMENT — PAIN SCALES - WONG BAKER: WONGBAKER_NUMERICALRESPONSE: 0

## 2023-09-22 ASSESSMENT — PAIN DESCRIPTION - DESCRIPTORS
DESCRIPTORS: ACHING
DESCRIPTORS: ACHING;SHARP

## 2023-09-22 NOTE — CONSULTS
Clinical Pharmacy Note  Warfarin Consult    Indiana Mansfield is a 81 y.o. female receiving warfarin managed by pharmacy. Patient being bridged with none. Warfarin Indication: Afib  Target INR range: 22-3   Dose prior to admission: 5mg daily except 2.5mg QTue,Thur,Sat    Current warfarin drug-drug interactions:     Recent Labs     09/20/23  1833 09/20/23  1834 09/21/23  0523 09/22/23  0426   HGB  --  12.7  --   --    HCT  --  36.4  --   --    INR 2.94*  --  2.85* 3.10*         Assessment/Plan:   Warfarin HOLD tonight due to elevated INR. Daily PT/INR until stable within therapeutic range. Managed as out patient by Gay Mcrae. Thank you for the consult. Will continue to follow.   Steven Flores [facility], 9/22/2023 6:34 AM

## 2023-09-23 LAB
INR PPP: 2.85 (ref 0.84–1.16)
PROTHROMBIN TIME: 29.7 SEC (ref 11.5–14.8)

## 2023-09-23 PROCEDURE — 6370000000 HC RX 637 (ALT 250 FOR IP): Performed by: NURSE PRACTITIONER

## 2023-09-23 PROCEDURE — 2580000003 HC RX 258: Performed by: SURGERY

## 2023-09-23 PROCEDURE — 6370000000 HC RX 637 (ALT 250 FOR IP): Performed by: SURGERY

## 2023-09-23 PROCEDURE — 97530 THERAPEUTIC ACTIVITIES: CPT

## 2023-09-23 PROCEDURE — 97166 OT EVAL MOD COMPLEX 45 MIN: CPT

## 2023-09-23 PROCEDURE — 1200000000 HC SEMI PRIVATE

## 2023-09-23 PROCEDURE — 97535 SELF CARE MNGMENT TRAINING: CPT

## 2023-09-23 PROCEDURE — 94760 N-INVAS EAR/PLS OXIMETRY 1: CPT

## 2023-09-23 PROCEDURE — 85610 PROTHROMBIN TIME: CPT

## 2023-09-23 PROCEDURE — 36415 COLL VENOUS BLD VENIPUNCTURE: CPT

## 2023-09-23 RX ORDER — WARFARIN SODIUM 2.5 MG/1
2.5 TABLET ORAL
Status: COMPLETED | OUTPATIENT
Start: 2023-09-23 | End: 2023-09-23

## 2023-09-23 RX ORDER — HYDROCODONE BITARTRATE AND ACETAMINOPHEN 5; 325 MG/1; MG/1
1 TABLET ORAL EVERY 8 HOURS PRN
Qty: 9 TABLET | Refills: 0 | Status: SHIPPED | OUTPATIENT
Start: 2023-09-23 | End: 2023-09-26

## 2023-09-23 RX ORDER — PSEUDOEPHEDRINE HCL 30 MG
100 TABLET ORAL DAILY
Qty: 30 CAPSULE | Refills: 0
Start: 2023-09-24 | End: 2023-10-24

## 2023-09-23 RX ADMIN — MUSCLE RUB CREAM: 100; 150 CREAM TOPICAL at 09:10

## 2023-09-23 RX ADMIN — HYDROCODONE BITARTRATE AND ACETAMINOPHEN 1 TABLET: 5; 325 TABLET ORAL at 22:24

## 2023-09-23 RX ADMIN — WARFARIN SODIUM 2.5 MG: 2.5 TABLET ORAL at 18:16

## 2023-09-23 RX ADMIN — DOCUSATE SODIUM 100 MG: 100 CAPSULE, LIQUID FILLED ORAL at 09:11

## 2023-09-23 RX ADMIN — SODIUM CHLORIDE, PRESERVATIVE FREE 10 ML: 5 INJECTION INTRAVENOUS at 20:25

## 2023-09-23 RX ADMIN — ATENOLOL 50 MG: 50 TABLET ORAL at 09:10

## 2023-09-23 RX ADMIN — ROPINIROLE HYDROCHLORIDE 2 MG: 1 TABLET, FILM COATED ORAL at 07:13

## 2023-09-23 RX ADMIN — ASPIRIN 81 MG CHEWABLE TABLET 81 MG: 81 TABLET CHEWABLE at 09:11

## 2023-09-23 RX ADMIN — HYDROCODONE BITARTRATE AND ACETAMINOPHEN 1 TABLET: 5; 325 TABLET ORAL at 12:55

## 2023-09-23 RX ADMIN — ROPINIROLE HYDROCHLORIDE 2 MG: 1 TABLET, FILM COATED ORAL at 18:16

## 2023-09-23 RX ADMIN — FUROSEMIDE 20 MG: 20 TABLET ORAL at 09:11

## 2023-09-23 RX ADMIN — DULOXETINE HYDROCHLORIDE 20 MG: 20 CAPSULE, DELAYED RELEASE ORAL at 09:11

## 2023-09-23 RX ADMIN — HYDROCODONE BITARTRATE AND ACETAMINOPHEN 1 TABLET: 5; 325 TABLET ORAL at 09:10

## 2023-09-23 RX ADMIN — ROPINIROLE HYDROCHLORIDE 2 MG: 1 TABLET, FILM COATED ORAL at 12:55

## 2023-09-23 RX ADMIN — LISINOPRIL 10 MG: 10 TABLET ORAL at 09:11

## 2023-09-23 RX ADMIN — PANTOPRAZOLE SODIUM 40 MG: 40 TABLET, DELAYED RELEASE ORAL at 20:25

## 2023-09-23 ASSESSMENT — PAIN DESCRIPTION - DESCRIPTORS: DESCRIPTORS: ACHING;DISCOMFORT

## 2023-09-23 ASSESSMENT — PAIN DESCRIPTION - FREQUENCY: FREQUENCY: CONTINUOUS

## 2023-09-23 ASSESSMENT — PAIN SCALES - WONG BAKER
WONGBAKER_NUMERICALRESPONSE: 0
WONGBAKER_NUMERICALRESPONSE: 4
WONGBAKER_NUMERICALRESPONSE: 0

## 2023-09-23 ASSESSMENT — PAIN DESCRIPTION - ONSET: ONSET: ON-GOING

## 2023-09-23 ASSESSMENT — PAIN SCALES - GENERAL
PAINLEVEL_OUTOF10: 0
PAINLEVEL_OUTOF10: 8
PAINLEVEL_OUTOF10: 8
PAINLEVEL_OUTOF10: 9

## 2023-09-23 ASSESSMENT — PAIN DESCRIPTION - ORIENTATION: ORIENTATION: RIGHT

## 2023-09-23 ASSESSMENT — PAIN DESCRIPTION - LOCATION: LOCATION: SACRUM

## 2023-09-23 ASSESSMENT — PAIN - FUNCTIONAL ASSESSMENT: PAIN_FUNCTIONAL_ASSESSMENT: PREVENTS OR INTERFERES SOME ACTIVE ACTIVITIES AND ADLS

## 2023-09-23 ASSESSMENT — PAIN DESCRIPTION - PAIN TYPE: TYPE: ACUTE PAIN

## 2023-09-23 NOTE — CARE COORDINATION
DISCHARGE SUMMARY     DATE OF DISCHARGE: 9/24/23    DISCHARGE DESTINATION: SNF   Ailin Morris accepted @ [phone]      [facility]  PHONE NUMBER: [phone]    FAX NUMBER: [phone]    INSURANCE PRECERT OBTAINED: MARY ESCOBAR/XOCHITL COMPLETED: yes    COVID RESULT: NA      TRANSPORTATION:     [facility]    Phone Number: [phone]      COMMENTS: Patient and daughter aware and in agreement with plans.

## 2023-09-23 NOTE — DISCHARGE INSTRUCTIONS
Neurosurgeon recommends interlaminar SUDEEP at L5-S1 and continued pain control. Recommend avoiding bending lifting and twisting for the next month and engaging in outpatient aggressive PT/OT in 4-6 weeks once insufficiency fractures have some time to heal. Can fu on an outpatient basis with PM&R team at [address] [place] [address] and Spine for further treatment.      Follow up with pcp 1 week after dc from f

## 2023-09-24 VITALS
RESPIRATION RATE: 18 BRPM | HEIGHT: 67 IN | HEART RATE: 60 BPM | SYSTOLIC BLOOD PRESSURE: 151 MMHG | DIASTOLIC BLOOD PRESSURE: 70 MMHG | TEMPERATURE: 97.5 F | OXYGEN SATURATION: 95 % | BODY MASS INDEX: 26.85 KG/M2 | WEIGHT: 171.08 LBS

## 2023-09-24 LAB
INR PPP: 2.16 (ref 0.84–1.16)
PROTHROMBIN TIME: 24 SEC (ref 11.5–14.8)

## 2023-09-24 PROCEDURE — 6370000000 HC RX 637 (ALT 250 FOR IP): Performed by: NURSE PRACTITIONER

## 2023-09-24 PROCEDURE — 94760 N-INVAS EAR/PLS OXIMETRY 1: CPT

## 2023-09-24 PROCEDURE — 6370000000 HC RX 637 (ALT 250 FOR IP): Performed by: SURGERY

## 2023-09-24 PROCEDURE — 2580000003 HC RX 258: Performed by: SURGERY

## 2023-09-24 PROCEDURE — 36415 COLL VENOUS BLD VENIPUNCTURE: CPT

## 2023-09-24 PROCEDURE — 85610 PROTHROMBIN TIME: CPT

## 2023-09-24 RX ORDER — WARFARIN SODIUM 5 MG/1
5 TABLET ORAL
Status: DISCONTINUED | OUTPATIENT
Start: 2023-09-24 | End: 2023-09-24 | Stop reason: HOSPADM

## 2023-09-24 RX ADMIN — FUROSEMIDE 20 MG: 20 TABLET ORAL at 09:01

## 2023-09-24 RX ADMIN — ASPIRIN 81 MG CHEWABLE TABLET 81 MG: 81 TABLET CHEWABLE at 09:01

## 2023-09-24 RX ADMIN — ATENOLOL 50 MG: 50 TABLET ORAL at 09:01

## 2023-09-24 RX ADMIN — DOCUSATE SODIUM 100 MG: 100 CAPSULE, LIQUID FILLED ORAL at 09:01

## 2023-09-24 RX ADMIN — ROPINIROLE HYDROCHLORIDE 2 MG: 1 TABLET, FILM COATED ORAL at 09:01

## 2023-09-24 RX ADMIN — DULOXETINE HYDROCHLORIDE 20 MG: 20 CAPSULE, DELAYED RELEASE ORAL at 09:01

## 2023-09-24 RX ADMIN — HYDROCODONE BITARTRATE AND ACETAMINOPHEN 1 TABLET: 5; 325 TABLET ORAL at 02:41

## 2023-09-24 RX ADMIN — SODIUM CHLORIDE, PRESERVATIVE FREE 10 ML: 5 INJECTION INTRAVENOUS at 09:04

## 2023-09-24 RX ADMIN — LISINOPRIL 10 MG: 10 TABLET ORAL at 09:01

## 2023-09-24 RX ADMIN — HYDROCODONE BITARTRATE AND ACETAMINOPHEN 1 TABLET: 5; 325 TABLET ORAL at 09:01

## 2023-09-24 RX ADMIN — ROPINIROLE HYDROCHLORIDE 2 MG: 1 TABLET, FILM COATED ORAL at 01:54

## 2023-09-24 ASSESSMENT — PAIN DESCRIPTION - ONSET
ONSET: ON-GOING
ONSET: ON-GOING

## 2023-09-24 ASSESSMENT — PAIN DESCRIPTION - FREQUENCY
FREQUENCY: CONTINUOUS
FREQUENCY: CONTINUOUS

## 2023-09-24 ASSESSMENT — PAIN DESCRIPTION - PAIN TYPE
TYPE: ACUTE PAIN
TYPE: ACUTE PAIN

## 2023-09-24 ASSESSMENT — PAIN - FUNCTIONAL ASSESSMENT
PAIN_FUNCTIONAL_ASSESSMENT: ACTIVITIES ARE NOT PREVENTED
PAIN_FUNCTIONAL_ASSESSMENT: PREVENTS OR INTERFERES SOME ACTIVE ACTIVITIES AND ADLS

## 2023-09-24 ASSESSMENT — PAIN SCALES - GENERAL
PAINLEVEL_OUTOF10: 9
PAINLEVEL_OUTOF10: 9
PAINLEVEL_OUTOF10: 8
PAINLEVEL_OUTOF10: 4

## 2023-09-24 ASSESSMENT — PAIN DESCRIPTION - DESCRIPTORS
DESCRIPTORS: ACHING;DISCOMFORT
DESCRIPTORS: ACHING;SHARP

## 2023-09-24 ASSESSMENT — PAIN DESCRIPTION - ORIENTATION
ORIENTATION: LOWER
ORIENTATION: LOWER

## 2023-09-24 ASSESSMENT — PAIN DESCRIPTION - LOCATION
LOCATION: BACK;LEG
LOCATION: BACK

## 2023-09-24 NOTE — CARE COORDINATION
Confirmed with RN dc plan is all set for today at 11am as arranged yesterday  Electronically signed by ARTEM Viveros on 9/24/2023 at 8:15 AM

## 2023-09-24 NOTE — PLAN OF CARE
82 yo female patient with hx of osteoporosis with multiple chronic compression fractures of T/L spine. Increased back pain after moving boxes. CT thoracic spine reviewed with age indeterminate compression fracture of T11 noted but new since Dec 2022. Would recommend a MRI thoracic spine to evaluate for any spinal cord compression and to determine age of fracture. TLSO brace for activity and patient comfort. Will review the MRI once available and make further recommendations at that time.
82 yo female patient with neurosurgery consult for acute T11 compression fracture. MRI thoracic spine ordered, no acute compression deformity noted, multiple chronic compression fractures of the T/L spine. No significant neural element compression noted. Neurosurgical intervention not required at this time. No need for TLSO brace as no acute fracture seen in thoracic spine. Would recommend conservative management with PT/OT and pain management.
MRI lumbar spine reviewed, demonstrates again acute sacral insufficiency fractures likely due to underlying osteoporosis, she does have multilevel degenerative spondylosis, some foraminal stenosis at L4-5 and L5-S1.     82 yo female with low back pain and radiating pain down the back of both lower extremities, likely a combination of pain from the sacral insufficiency fractures and multilevel DDD with some foraminal stenosis at L4-5 and L5-S1 accounting for some radicular pain. Would recommend an interlaminar SUDEEP at L5-S1 and continued pain control. Recommend avoiding bending lifting and twisting for the next month and engaging in PT/OT in 4-6 weeks once insufficiency fractures have some time to heal. Can fu on an outpatient basis with out PM&R team at [address] [place] [address] and Spine for further treatment.
Problem: Discharge Planning  Goal: Discharge to home or other facility with appropriate resources  9/21/2023 2150 by Fahad Mcgowan RN  Outcome: Progressing  Flowsheets (Taken 9/21/2023 2136)  Discharge to home or other facility with appropriate resources: Identify barriers to discharge with patient and caregiver  9/21/2023 1546 by Oscar Potter RN  Outcome: Progressing  Flowsheets  Taken 9/21/2023 1546 by Oscar Potter RN  Discharge to home or other facility with appropriate resources:   Identify barriers to discharge with patient and caregiver   Identify discharge learning needs (meds, wound care, etc)   Refer to discharge planning if patient needs post-hospital services based on physician order or complex needs related to functional status, cognitive ability or social support system   Arrange for needed discharge resources and transportation as appropriate  Taken 9/21/2023 0524 by Fahad Mcgowan RN  Discharge to home or other facility with appropriate resources: Identify barriers to discharge with patient and caregiver     Problem: Pain  Goal: Verbalizes/displays adequate comfort level or baseline comfort level  9/21/2023 2150 by Fahad Mcgowan RN  Outcome: Progressing  Flowsheets (Taken 9/21/2023 1546 by Oscar Potter RN)  Verbalizes/displays adequate comfort level or baseline comfort level:   Encourage patient to monitor pain and request assistance   Administer analgesics based on type and severity of pain and evaluate response   Consider cultural and social influences on pain and pain management   Assess pain using appropriate pain scale   Implement non-pharmacological measures as appropriate and evaluate response  9/21/2023 1546 by Oscar Potter RN  Outcome: Progressing  Flowsheets (Taken 9/21/2023 1546)  Verbalizes/displays adequate comfort level or baseline comfort level:   Encourage patient to monitor pain and request assistance   Administer analgesics based on type and severity of pain and
Problem: Discharge Planning  Goal: Discharge to home or other facility with appropriate resources  9/23/2023 2057 by Sung Diehl RN  Outcome: Progressing  Flowsheets (Taken 9/23/2023 2041)  Discharge to home or other facility with appropriate resources: Identify barriers to discharge with patient and caregiver  9/23/2023 1104 by Han Trevino RN  Outcome: Progressing  Flowsheets (Taken 9/23/2023 1104)  Discharge to home or other facility with appropriate resources: Identify barriers to discharge with patient and caregiver     Problem: Pain  Goal: Verbalizes/displays adequate comfort level or baseline comfort level  9/23/2023 2057 by Sung Diehl RN  Outcome: Progressing  Flowsheets (Taken 9/23/2023 1104 by Han Trevino RN)  Verbalizes/displays adequate comfort level or baseline comfort level: Encourage patient to monitor pain and request assistance  9/23/2023 1104 by Han Trevino RN  Outcome: Progressing  Flowsheets (Taken 9/23/2023 1104)  Verbalizes/displays adequate comfort level or baseline comfort level: Encourage patient to monitor pain and request assistance     Problem: Skin/Tissue Integrity  Goal: Absence of new skin breakdown  Description: 1. Monitor for areas of redness and/or skin breakdown  2. Assess vascular access sites hourly  3. Every 4-6 hours minimum:  Change oxygen saturation probe site  4. Every 4-6 hours:  If on nasal continuous positive airway pressure, respiratory therapy assess nares and determine need for appliance change or resting period.   9/23/2023 2057 by Sung Diehl RN  Outcome: Progressing  9/23/2023 1104 by Han Trevino RN  Outcome: Progressing     Problem: Safety - Adult  Goal: Free from fall injury  9/23/2023 2057 by Sung Diehl RN  Outcome: Mayda Root (Taken 9/23/2023 2047)  Free From Fall Injury: Instruct family/caregiver on patient safety  9/23/2023 1104 by Han Trevino RN  Outcome: Progressing  Flowsheets (Taken
Problem: Discharge Planning  Goal: Discharge to home or other facility with appropriate resources  Outcome: Progressing  Flowsheets  Taken 9/21/2023 1546 by Magalis Mancera RN  Discharge to home or other facility with appropriate resources:   Identify barriers to discharge with patient and caregiver   Identify discharge learning needs (meds, wound care, etc)   Refer to discharge planning if patient needs post-hospital services based on physician order or complex needs related to functional status, cognitive ability or social support system   Arrange for needed discharge resources and transportation as appropriate  Taken 9/21/2023 0524 by Andres Nielsen RN  Discharge to home or other facility with appropriate resources: Identify barriers to discharge with patient and caregiver     Problem: Pain  Goal: Verbalizes/displays adequate comfort level or baseline comfort level  Outcome: Progressing  Flowsheets (Taken 9/21/2023 1546)  Verbalizes/displays adequate comfort level or baseline comfort level:   Encourage patient to monitor pain and request assistance   Administer analgesics based on type and severity of pain and evaluate response   Consider cultural and social influences on pain and pain management   Assess pain using appropriate pain scale   Implement non-pharmacological measures as appropriate and evaluate response     Problem: Skin/Tissue Integrity  Goal: Absence of new skin breakdown  Description: 1. Monitor for areas of redness and/or skin breakdown  2. Assess vascular access sites hourly  3. Every 4-6 hours minimum:  Change oxygen saturation probe site  4. Every 4-6 hours:  If on nasal continuous positive airway pressure, respiratory therapy assess nares and determine need for appliance change or resting period. Outcome: Progressing  Note: Patient skin condition and mucus membrane integrity remain unchanged during this shift. Skin breakdown prevention interventions are in place.  Will continue to
Problem: Discharge Planning  Goal: Discharge to home or other facility with appropriate resources  Outcome: Progressing  Flowsheets (Taken 9/21/2023 0111)  Discharge to home or other facility with appropriate resources:   Identify barriers to discharge with patient and caregiver   Arrange for needed discharge resources and transportation as appropriate   Identify discharge learning needs (meds, wound care, etc)     Problem: Pain  Goal: Verbalizes/displays adequate comfort level or baseline comfort level  Outcome: Progressing  Flowsheets (Taken 9/21/2023 0111)  Verbalizes/displays adequate comfort level or baseline comfort level:   Encourage patient to monitor pain and request assistance   Assess pain using appropriate pain scale   Administer analgesics based on type and severity of pain and evaluate response   Implement non-pharmacological measures as appropriate and evaluate response     Problem: Skin/Tissue Integrity  Goal: Absence of new skin breakdown  Description: 1. Monitor for areas of redness and/or skin breakdown  2. Assess vascular access sites hourly  3. Every 4-6 hours minimum:  Change oxygen saturation probe site  4. Every 4-6 hours:  If on nasal continuous positive airway pressure, respiratory therapy assess nares and determine need for appliance change or resting period.   Outcome: Progressing     Problem: Safety - Adult  Goal: Free from fall injury  Outcome: Progressing  Flowsheets (Taken 9/21/2023 0111)  Free From Fall Injury:   Instruct family/caregiver on patient safety   Based on caregiver fall risk screen, instruct family/caregiver to ask for assistance with transferring infant if caregiver noted to have fall risk factors
Problem: Discharge Planning  Goal: Discharge to home or other facility with appropriate resources  Outcome: Progressing  Flowsheets (Taken 9/22/2023 2000)  Discharge to home or other facility with appropriate resources: Identify barriers to discharge with patient and caregiver     Problem: Pain  Goal: Verbalizes/displays adequate comfort level or baseline comfort level  Outcome: Progressing     Problem: Skin/Tissue Integrity  Goal: Absence of new skin breakdown  Description: 1. Monitor for areas of redness and/or skin breakdown  2. Assess vascular access sites hourly  3. Every 4-6 hours minimum:  Change oxygen saturation probe site  4. Every 4-6 hours:  If on nasal continuous positive airway pressure, respiratory therapy assess nares and determine need for appliance change or resting period.   Outcome: Progressing     Problem: Safety - Adult  Goal: Free from fall injury  Outcome: Progressing  Flowsheets (Taken 9/22/2023 2100)  Free From Fall Injury:   Instruct family/caregiver on patient safety   Based on caregiver fall risk screen, instruct family/caregiver to ask for assistance with transferring infant if caregiver noted to have fall risk factors     Problem: ABCDS Injury Assessment  Goal: Absence of physical injury  Outcome: Progressing  Flowsheets (Taken 9/22/2023 2100)  Absence of Physical Injury: Implement safety measures based on patient assessment
Problem: Discharge Planning  Goal: Discharge to home or other facility with appropriate resources  Outcome: Progressing  Flowsheets (Taken 9/23/2023 2041 by Vivian Chang RN)  Discharge to home or other facility with appropriate resources: Identify barriers to discharge with patient and caregiver     Problem: Pain  Goal: Verbalizes/displays adequate comfort level or baseline comfort level  Outcome: Progressing     Problem: Skin/Tissue Integrity  Goal: Absence of new skin breakdown  Description: 1. Monitor for areas of redness and/or skin breakdown  2. Assess vascular access sites hourly  3. Every 4-6 hours minimum:  Change oxygen saturation probe site  4. Every 4-6 hours:  If on nasal continuous positive airway pressure, respiratory therapy assess nares and determine need for appliance change or resting period.   Outcome: Progressing     Problem: Safety - Adult  Goal: Free from fall injury  Outcome: Progressing     Problem: ABCDS Injury Assessment  Goal: Absence of physical injury  Outcome: Progressing
assistance with transferring infant if caregiver noted to have fall risk factors     Problem: ABCDS Injury Assessment  Goal: Absence of physical injury  9/23/2023 1104 by Michelle Gr RN  Outcome: Progressing  Flowsheets (Taken 9/23/2023 1104)  Absence of Physical Injury: Implement safety measures based on patient assessment  9/23/2023 0644 by Maira Pino RN  Outcome: Progressing  Flowsheets (Taken 9/22/2023 2100)  Absence of Physical Injury: Implement safety measures based on patient assessment

## 2023-09-24 NOTE — PROGRESS NOTES
4 Eyes Skin Assessment     NAME:  Agatha Hollins  YOB: 1940  MEDICAL RECORD NUMBER:  [id number]    The patient is being assessed for  Admission    I agree that at least one RN has performed a thorough Head to Toe Skin Assessment on the patient. ALL assessment sites listed below have been assessed. Areas assessed by both nurses:    Head, Face, Ears, Shoulders, Back, Chest, Arms, Elbows, Hands, Sacrum. Buttock, Coccyx, Ischium, Legs. Feet and Heels, and Under Medical Devices         Does the Patient have a Wound?  No noted wound(s)       Jorge L Prevention initiated by RN: Yes  Wound Care Orders initiated by RN: Yes    Pressure Injury (Stage 3,4, Unstageable, DTI, NWPT, and Complex wounds) if present, place Wound referral order by RN under : Yes    New Ostomies, if present place, Ostomy referral order under : No     Nurse 1 eSignature: Electronically signed by Addi Chan RN on 9/21/23 at 1:28 AM EDT    **SHARE this note so that the co-signing nurse can place an eSignature**    Nurse 2 eSignature: Electronically signed by Liz Herbert RN on 9/21/23 at 3:00 AM EDT
Attempted to call report 3 times to [facility].  stated she would take name and phone number to have nurse call back for report. Pt discharged to [facility], IV removed. Transported with all belongings with [employer].    Electronically signed by Verner Lucky, RN on 9/24/2023 at 11:18 AM
Call out to Viri Pruitt PA-C Left a message for:    Ke English ( 1940) Room 3108    Per Chacha Mcgill RN in Interventional Radiology Epidural Series Injection are not performed here at [facility].
Clinical Pharmacy Note  Warfarin Consult    Brigette Melchor is a 81 y.o. female receiving warfarin managed by pharmacy. Patient being bridged with none. Warfarin Indication: Afib  Target INR range: 22-3   Dose prior to admission: 5mg daily except 2.5mg Saleem Go,Sat    Current warfarin drug-drug interactions: none of significance    Recent Labs     09/22/23  0426 09/23/23  0502 09/24/23  0419   INR 3.10* 2.85* 2.16*         Assessment/Plan:    Warfarin 5 mg tonight    Thank you for the consult. Will continue to follow.     Gisella Melendez, PharmD.  9/24/2023  8:48 AM
Clinical Pharmacy Note  Warfarin Consult    Diego Amin is a 81 y.o. female receiving warfarin managed by pharmacy. Patient being bridged with none. Warfarin Indication: Afib  Target INR range: 22-3   Dose prior to admission: 5mg daily except 2.5mg QTue,Thur,Sat    Current warfarin drug-drug interactions: none of significance    Recent Labs     09/20/23  1834 09/21/23  0523 09/22/23  0426 09/23/23  0502   HGB 12.7  --   --   --    HCT 36.4  --   --   --    INR  --  2.85* 3.10* 2.85*         Assessment/Plan:    Warfarin 2.5 mg tonight    Thank you for the consult. Will continue to follow.     So Jett PharmD.  9/23/2023  9:34 AM
Hospitalist Progress Note      PCP: Doron Lynn MD    Date of Admission: 9/20/2023    Chief Complaint: back pain    Hospital Course: Storm Day is a 81 y.o. female who presents with back pain of a few days duration. Was moving boxes when she all of a sudden had an abrupt increase in back pain. Fam doc did xrays and found compression fx T12. Patient also reports difficultly urinating since back pain began. Denies fevers of dysuria. Bladder scan showing 247cc. who presents with Compression fracture of body of thoracic vertebra. Admitted for further treatment. Subjective: Pt sitting up in bed, agrees to go to SNF states \"I cant go home, I cant take care of myself like this. \" Awaiting neurosurgery eval and MRI review. Pt denies cp sob palpitations abd pain dizziness. Reviewed POC, denies needs. Assessment/Plan:    Chronic T12 compression fracture  Acute sacral insufficiency fractures  Hx of several compression fractures  - neurosurgery consulted, ordered MRI. MRI showed chronic T12 compression fracutre, and acute bilateral sacral insufficiency fractures. After review of MRIs, recommended interlaminar SUDEEP at L5-S1 and continued pain control. Recommend avoiding bending lifting and twisting for the next month and engaging in PT/OT in 4-6 weeks once insufficiency fractures have some time to heal. Can fu on an outpatient basis with out PM&R team at [address] [place] [address] and Spine for further treatment. -SUDEEP is not completed at [facility] and will need to be outpatient.  Neurosurgery will need to arrange  -clarified further with Neurosurgery PT/OT, ok to do PT/OT in hospital and at SNF  - analgesia-multimodal pain medication     Pmhx:   CAD with stents 2014  CVD  Osteoporosis  Carotid disease  Chronic back pain  HTN  GERD  Afib-rate controlled  Prediabetes  -continue home meds as ordered  -INR stable       Active Hospital Problems    Diagnosis     Compression fracture of body of thoracic vertebra ([address])
Hospitalist Progress Note      PCP: Marla Chaidez MD    Date of Admission: 9/20/2023    Chief Complaint: back pain    Hospital Course: [place] is a 81 y.o. female who presents with back pain of a few days duration. Was moving boxes when she all of a sudden had an abrupt increase in back pain. Fam doc did xrays and found compression fx T12. Patient also reports difficultly urinating since back pain began. Denies fevers of dysuria. Bladder scan showing 247cc. who presents with Compression fracture of body of thoracic vertebra. Admitted for further treatment. Subjective: Pt sitting up in bed, agrees to go to SNF states \"I cant go home, I cant take care of myself like this. \" Awaiting neurosurgery eval and MRI review. Pt denies cp sob palpitations abd pain dizziness. Reviewed POC, denies needs. Assessment/Plan:    Chronic T12 compression fracture  Acute sacral insufficiency fractures  Hx of several compression fractures  - neurosurgery consulted, ordered MRI. MRI showed chronic T12 compression fracutre, and acute bilateral sacral insufficiency fractures. After review of MRIs, recommended interlaminar SUDEEP at L5-S1 and continued pain control. Recommend avoiding bending lifting and twisting for the next month and engaging in PT/OT in 4-6 weeks once insufficiency fractures have some time to heal. Can fu on an outpatient basis with out PM&R team at [address] [place] [address] and Spine for further treatment. -SUDEEP is not completed at [facility] and will need to be outpatient.  Neurosurgery will need to arrange  -clarified further with Neurosurgery PT/OT, ok to do PT/OT in hospital and at SNF  - analgesia-multimodal pain medication     Pmhx:   CAD with stents 2014  CVD  Osteoporosis  Carotid disease  Chronic back pain  HTN  GERD  Afib-rate controlled  Prediabetes  -continue home meds as ordered  -INR trending up to 3.1, pharmacy managing coumadin, hold tonight        Active Hospital Problems    Diagnosis
Occupational Therapy    09/21/23    Itzel Carrillo  1940  [id number]    OT orders noted. Patient chart reviewed. Pt with various compression fractures per imagining- will await OT/PT evaluation until cleared by Neurosurgery.     Electronically signed by SELMA Don, OTR/L on 9/21/2023 at 7:49 AM
Occupational Therapy    09/22/23    Maggie Carrillo  1940  [id number]    OT orders still noted. Cont to be awaiting Neurosurgery plans after lumbar MRI. Will re-attempt as schedule allows.     Electronically signed by SELMA Bucio, OTR/L on 9/22/2023 at 1:23 PM
Patient arrived to room 22 [id number] from  ED. Patient is A/Ox4. Oriented patient to room and call light. Pt stated that she was having pain in her lower back. Instructed patient to call for help out of bed, patient verbalized understanding. Patient denies any further needs at this time, will continue to monitor and assess for needs and comfort. Will continue to monitor.  Electronically signed by Sergo Luke RN on 9/21/2023 at 1:21 AM
Patient developing pain in her lower back area. States Tylenol isn't effective in reducing pain. Perfect Serve sent to MD. Moreno Hylton ordered and will be administered to patient accordingly (see eMAR).  Electronically signed by Jorge Torres RN on 9/21/2023 at 3:52 PM
Patient in bed, A&O X4. VSS. Left PIV flushed, dressing CDI, NS locked and capped at this time. Patient denies pain at this time. Tylenol ordered PRN for pain relief if needed. AM medications taken without complaint (see eMAR). Patient tolerating PO intake and appetite adequate. No other needs verbalized at this time. Patient repositioned for comfort. Back brace in place from home. Patient declined ice for affected area. Standard safety precautions in place and call light within reach.  Electronically signed by Arian Staples RN on 9/21/2023 at 0830 AM
Patient is alert & oriented x4, resting in bed, on room air. Pt c/o pain in back, PRN medications given as ordered. 2/4 bed rails up, bed in lowest position, fall precautions in place, bed alarm on, call light within reach. Morning medications given as ordered. Denies further needs at this time. Will cont to monitor and reassess.   Electronically signed by Diane Doyle RN on 9/24/2023 at 11:32 AM
Patient resting in chair upon assessment, A/Ox4 and vital signs stable. Complains of pain to back and buttock - patient states she applied Bengay and this helped. Patient asking about Requip administration. States at home she takes it whenever she wants to and there is not a designated time for this med. Educated patient that this medication is scheduled here ever 6 hours and we do not just give medications the way she is taking this at home. NP Joslyn at bedside.
Patient states her pain is still a 10/10. Reports minimal relief after administration of Edythe Ing available every 8 hours. Perfect Serve sent to MD relaying information and reporting patient's continued pain. Decadron 10mg IVP Once, Morphine 2mg IVP once, and Robaxin 1000 mg IVPB Q8Hrs ordered for relief. Will administer as directed (see eMAR) and monitor for pain relief.  Electronically signed by Deni Eng RN on 9/21/2023 at [phone] PM
Patient up to bathroom with steady, aware of discharge in the am. Has no complaints, no acute events this shift.
Per Ernie MURRAY with neurosurgery, nursing communication order she placed: \"Ok to work with PT/OT while inpatient and in rehab\"         Will order PT/OT.
Perfect Serve Message to EZEQUIEL Norris per patient request  1) norco .5-1 tab prn order   2) PRN order for requip  3) PRN order for icy/hot cream    Awaiting response.  no response    Perfect serve message to [address]  patient is have sever back pain and leg \"Jumping\". Her order for norco  will you order. Elinor Coello   1) PRN norco .5-1 tab  2) PRN order for requip  3) PRN order for icy/hot cream    Awaiting response
Physical Therapy        Lorri Rebolledo  9/21/2023    -chart reviewed  -will await Neurosurgical consult for recommendations    Electronically signed by Marcial Demarco PT on 9/21/2023 at 7:51 AM    Addendum:  -Neurosurgical consult reviewed  -will await MRI and updated recommendations before mobilizing from bed  Electronically signed by Marcial Demarco PT on 9/21/2023 at 11:11 AM
Physical Therapy  Facility/Department: [address] ORTHOPEDICS  Physical Therapy Initial Assessment  This note serves as patient discharge summary if pt discharges prior to next PT visit      Name: Dalton Marlow  : 1940  MRN: [id number]  Date of Service: 2023    Discharge Recommendations:  Patient would benefit from continued therapy after discharge (3-5)   Dalton Marlow scored a 14/24 on the AM-PAC short mobility form. Current research shows that an AM-PAC score of 17 or less is typically not associated with a discharge to the patient's home setting. Based on the patient's AM-PAC score and their current functional mobility deficits, it is recommended that the patient have 3-5 sessions per week of Physical Therapy at d/c to increase the patient's independence. Please see assessment section for further patient specific details. PT Equipment Recommendations  Other: monitor      Patient Diagnosis(es): The primary encounter diagnosis was Sacral insufficiency fracture, initial encounter. Diagnoses of Other closed fracture of eleventh thoracic vertebra, initial encounter ([address]) and Impaired mobility and activities of daily living were also pertinent to this visit. Past Medical History:  has a past medical history of A-fib ([address]), Acid reflux, Arthritis, Atrial fibrillation ([address]), CAD (coronary artery disease), Diarrhea, Dysphagia, Fatty liver, Hyperlipidemia, Hypertension, Prolonged emergence from general anesthesia, Restless leg, Stomach ulcer, and TIA (transient ischemic attack). Past Surgical History:  has a past surgical history that includes Cholecystectomy; lipoma resection; Coronary angioplasty with stent (); Colonoscopy; Dilation and curettage of uterus (N/A, 2019); Carotid endarterectomy (Right, ); Carotid endarterectomy (Left); eye surgery; Upper gastrointestinal endoscopy (N/A, 2/10/2023);  Upper gastrointestinal endoscopy (N/A, 3/17/2023); and Upper gastrointestinal
Provizio BRAXTON Scanner Results  Pt was scanned according to 's recommendations. Admission      Site Reading Redness noted? Y/N   Right heel 0.0    Left heel  0.2    Sacrum 02        []  BRAXTON Delta score < 0.6 indicates normal, healthy tissue at this time. Continue to assess daily and implement routine pressure injury prevention measures using the Jorge L Order Set. Scan weekly on Wednesday. [] BRAXTON Delta score > or = to 0.6 indicates at risk tissue. Implement target prevention interventions below and scan daily. [] Jorge L orders reviewed and updated if indicated  [] Educated patient/family on importance of offloading/repositioning  [] Patient agreeable to plan for pressure offloading/repositioning    Liquid barrier film wipes      [] Nutrition consult made  [] Nutrition consult not indicated at this time     [] Sacral foam border in place  [] Sacral foam border not in place, barrier cream applied    [] Low air loss mattress (or Isoflex blue/orange mattress) ordered/placed   [] For heels, apply liquid skin barrier twice daily and ensure offloading with pillows or boots    Every two hour repositioning    Maintain Routine Pressure Ulcer Prevention Strategies;  Enter Jorge L orders as appropriate\"    Continue previous interventions and monitor skin (heels & sacrum) frequently    Continue previous interventions and monitor skin (heels and sacrum) frequently
Pt in bed resting quietly. Pt complained of having pain PRN pain medication was given. Safety measures are in place call light in reach bed in lowest position. Will continue to monitor.  Electronically signed by Kathleen Hilario RN on 9/21/2023 at 9:49 PM
Pt up in chair AM medications and pain pill given pt states, \" no one came to fit her for a TLSO brace , and even if they did she would not wear it . \" Pt. Refuses brace.
Report called to Matthew Calixto RN at [facility]. All questions answered.    Electronically signed by Sruthi Lynn RN on 9/24/2023 at 11:34 AM
Telecam placed in room. Pt impulsive and sits at the edge of bed quick. Educated regarding necessity of telecam, pt agreeable.
V2.0    [facility] Progress Note      Name:  Kaitlin Melgar /Age/Sex: 1940  (81 y.o. female)   MRN & CSN:  [id number] & [id number] Encounter Date/Time: 2023 1:46 PM EDT   Location:  [id number]/3108-01 PCP: Mona Mccurdy MD     Attending: Halima Miller MD       Hospital Day: 2    Assessment and Recommendations   Kaitlin Melgar is a 81 y.o. female who presents with back pain of a few days duration. Was moving boxes when she all of a sudden had an abrupt increase in back pain. Fam doc did xrays and found compression fx T12. Patient also reports difficultly urinating since back pain began. Denies fevers of dysuria. Bladder scan showing 247cc. who presents with Compression fracture of body of thoracic vertebra (HCC)      Plan:   Acute T11 fx  Hx of several compression fractures  - NSGY aware   - analgesia-multimodal pain medication  - PT/OT  - CM for placement  MRI thoracic spine with no acute fractures noted multiple chronic fractures present. Chronic: Medical conditions stable  CAD with stents   CVD  Osteoporosis  Carotid disease  Chronic back pain  HTN  GERD  Afib-rate controlled  Prediabetes           Dispo: med/surg  Ppx:  indications for ulcer and DVT ppx reviewed and medications ordered as needed          Diet ADULT DIET; Regular   DVT Prophylaxis [] Lovenox, []  Heparin, [] SCDs, [] Ambulation,  [] Eliquis, [] Xarelto  [] Coumadin   Code Status Full Code   Disposition From: home  Expected Disposition: TBD  Estimated Date of Discharge: 1-2 days  Patient requires continued admission due to low back pain   Surrogate Decision Maker/ POA Self and children     Personally reviewed Lab Studies and Imaging       EKG not indicated    Imaging that was interpreted personally includes MRI thoracic spine with no acute fractures noted; age indeterminate compression fracture noted.   No spinal stenosis or foraminal narrowing noted     Drugs that require monitoring for toxicity include warfarin and the
[FreeTextEntry1] : sciatica\par ok for pt to start medrol dose pack\par pt instructed to stop Ibuprofen\par cont gabapentin 100mg TID\par awaiting authorization for MRI\par f/u with ortho \par VNS referral 
with some foraminal stenosis at L4-5 and L5-S1 accounting for some radicular pain. Would recommend an interlaminar SUDEEP at L5-S1 and continued pain control. Recommend avoiding bending lifting and twisting for the next month and engaging in PT/OT in 4-6 weeks once insufficiency fractures have some time to heal. Can fu on an outpatient basis with out PM&R team at [address] [place] [address] and Spine for further treatment. \"  On 9-22-23 at ~315 pm, DARRIUS Medrano - CNP contacts neurosurgery regarding if patient allowed mobility out of bed/gait, and receives response of \"Ok to work with PT/OT while inpatient and in rehab [facility]). \"  PMHx as noted, including OA B knees. Response To Previous Treatment: Patient with no complaints from previous session. Family / Caregiver Present: No  Follows Commands: Within Functional Limits (increased time)  Subjective  Subjective: arrived to room along with OT to patient resting in bed -partial left sidelying  - she is oriented and agreeable to OT assessment and PT session   -states pain at rest is moderate and severe with movement         Social/Functional History  Social/Functional History  Lives With: Alone  Type of Home: House  Home Layout: One level (laundry main floor)  Home Access: Ramped entrance (at back door)  Bathroom Shower/Tub: Walk-in shower  Bathroom Toilet: Handicap height  Bathroom Equipment: Grab bars in shower, Shower chair (suction cup grab bars in shower. Toilet safety frames.)  Home Equipment: Walker, standard, Walker, 4 wheeled  Has the patient had two or more falls in the past year or any fall with injury in the past year?: Yes  Receives Help From: Family, Friend(s)  ADL Assistance: [address] Needs assistance (keeps tidy, does laundry.   Family/neighbors sometimes brings meals.)  Ambulation Assistance: Independent  Transfer Assistance: Independent (difficult getting into and out of chairs.)  Active : No  IADL Comments: cleaning
: 34.69 (09/23/23 2124)  ADL Inpatient CMS 0-100% Score: 56.46 (09/23/23 0926)  ADL Inpatient CMS G-Code Modifier : CK (09/23/23 0926)    Goals  Short Term Goals  Time Frame for Short Term Goals: prior to d/c  Short Term Goal 1: modA fxl tx in prep to complete ADL routine  Short Term Goal 2: Edy bed mobility in prep to complete fxl ADL tx  Short Term Goal 3: modA LB dressing/bathing with AE prn  Short Term Goal 4: set up assist seated UB bathing/dressing  Short Term Goal 5: standing x5 minutes with Edy to complete ADL task  Long Term Goals  Time Frame for Long Term Goals : STGs=LTGs  Patient Goals   Patient goals :  To be pain free and to walk again       Therapy Time   Individual Concurrent Group Co-treatment   Time In 0830         Time Out 0915         Minutes 45         Timed Code Treatment Minutes: 30 Minutes (15 minute eval)     Electronically signed by GABBY Elliott on 9/23/2023 at 9:30 AM

## 2023-11-21 ENCOUNTER — HOSPITAL ENCOUNTER (OUTPATIENT)
Age: 83
Setting detail: SPECIMEN
Discharge: HOME | End: 2023-11-21
Payer: MEDICARE

## 2023-11-21 LAB
BACTERIA URNS QL MICRO: ABNORMAL /HPF
BILIRUB UR QL STRIP.AUTO: NEGATIVE
CLARITY UR: ABNORMAL
COLOR UR: YELLOW
EPI CELLS #/AREA URNS AUTO: 2 /HPF (ref 0–5)
GLUCOSE UR STRIP.AUTO-MCNC: NEGATIVE MG/DL
HGB UR QL STRIP.AUTO: NEGATIVE
HYALINE CASTS #/AREA URNS AUTO: 1 /LPF (ref 0–8)
KETONES UR STRIP.AUTO-MCNC: ABNORMAL MG/DL
LEUKOCYTE ESTERASE UR QL STRIP.AUTO: ABNORMAL
NITRITE UR QL STRIP.AUTO: NEGATIVE
PH UR STRIP.AUTO: 5 [PH] (ref 5–8)
PROT UR STRIP.AUTO-MCNC: NEGATIVE MG/DL
RBC CLUMPS #/AREA URNS AUTO: 1 /HPF (ref 0–4)
SP GR UR STRIP.AUTO: 1.02 (ref 1–1.03)
UA DIPSTICK W REFLEX MICRO PNL UR: YES
URN SPEC COLLECT METH UR: ABNORMAL
UROBILINOGEN UR STRIP-ACNC: 1 E.U./DL
WBC #/AREA URNS AUTO: 19 /HPF (ref 0–5)

## 2023-11-21 PROCEDURE — 81001 URINALYSIS AUTO W/SCOPE: CPT

## 2023-11-21 PROCEDURE — 87086 URINE CULTURE/COLONY COUNT: CPT

## 2023-11-22 LAB — BACTERIA UR CULT: NORMAL

## 2024-02-19 ENCOUNTER — APPOINTMENT (OUTPATIENT)
Dept: GENERAL RADIOLOGY | Age: 84
DRG: 321 | End: 2024-02-19
Payer: MEDICARE

## 2024-02-19 ENCOUNTER — APPOINTMENT (OUTPATIENT)
Dept: CARDIAC CATH/INVASIVE PROCEDURES | Age: 84
DRG: 321 | End: 2024-02-19
Payer: MEDICARE

## 2024-02-19 ENCOUNTER — HOSPITAL ENCOUNTER (INPATIENT)
Age: 84
LOS: 1 days | Discharge: HOME | DRG: 321 | End: 2024-02-20
Attending: EMERGENCY MEDICINE | Admitting: FAMILY MEDICINE
Payer: MEDICARE

## 2024-02-19 DIAGNOSIS — I21.4 NSTEMI (NON-ST ELEVATED MYOCARDIAL INFARCTION) (HCC): Primary | ICD-10-CM

## 2024-02-19 LAB
ALBUMIN SERPL-MCNC: 3.7 G/DL (ref 3.4–5)
ALBUMIN/GLOB SERPL: 1.3 {RATIO} (ref 1.1–2.2)
ALP SERPL-CCNC: 84 U/L (ref 40–129)
ALT SERPL-CCNC: 12 U/L (ref 10–40)
ANION GAP SERPL CALCULATED.3IONS-SCNC: 10 MMOL/L (ref 3–16)
ANTI-XA UNFRAC HEPARIN: 0.41 IU/ML (ref 0.3–0.7)
APTT BLD: 26.7 SEC (ref 22.7–35.9)
AST SERPL-CCNC: 16 U/L (ref 15–37)
BASOPHILS # BLD: 0.1 K/UL (ref 0–0.2)
BASOPHILS NFR BLD: 0.9 %
BILIRUB SERPL-MCNC: 0.3 MG/DL (ref 0–1)
BUN SERPL-MCNC: 21 MG/DL (ref 7–20)
CALCIUM SERPL-MCNC: 9.1 MG/DL (ref 8.3–10.6)
CHLORIDE SERPL-SCNC: 100 MMOL/L (ref 99–110)
CO2 SERPL-SCNC: 26 MMOL/L (ref 21–32)
CREAT SERPL-MCNC: <0.5 MG/DL (ref 0.6–1.2)
DEPRECATED RDW RBC AUTO: 15.4 % (ref 12.4–15.4)
EKG ATRIAL RATE: 64 BPM
EKG ATRIAL RATE: 84 BPM
EKG DIAGNOSIS: NORMAL
EKG DIAGNOSIS: NORMAL
EKG P AXIS: 21 DEGREES
EKG P AXIS: 26 DEGREES
EKG P-R INTERVAL: 146 MS
EKG P-R INTERVAL: 150 MS
EKG Q-T INTERVAL: 364 MS
EKG Q-T INTERVAL: 412 MS
EKG QRS DURATION: 82 MS
EKG QRS DURATION: 84 MS
EKG QTC CALCULATION (BAZETT): 425 MS
EKG QTC CALCULATION (BAZETT): 430 MS
EKG R AXIS: -11 DEGREES
EKG R AXIS: -2 DEGREES
EKG T AXIS: 50 DEGREES
EKG T AXIS: 57 DEGREES
EKG VENTRICULAR RATE: 64 BPM
EKG VENTRICULAR RATE: 84 BPM
EOSINOPHIL # BLD: 0.2 K/UL (ref 0–0.6)
EOSINOPHIL NFR BLD: 2.6 %
GFR SERPLBLD CREATININE-BSD FMLA CKD-EPI: >60 ML/MIN/{1.73_M2}
GLUCOSE SERPL-MCNC: 136 MG/DL (ref 70–99)
HCT VFR BLD AUTO: 36.8 % (ref 36–48)
HGB BLD-MCNC: 12 G/DL (ref 12–16)
INR PPP: 1.09 (ref 0.84–1.16)
INR PPP: 1.24 (ref 0.84–1.16)
LYMPHOCYTES # BLD: 1.8 K/UL (ref 1–5.1)
LYMPHOCYTES NFR BLD: 31.2 %
MCH RBC QN AUTO: 29.2 PG (ref 26–34)
MCHC RBC AUTO-ENTMCNC: 32.8 G/DL (ref 31–36)
MCV RBC AUTO: 89.2 FL (ref 80–100)
MONOCYTES # BLD: 0.7 K/UL (ref 0–1.3)
MONOCYTES NFR BLD: 12.1 %
NEUTROPHILS # BLD: 3.1 K/UL (ref 1.7–7.7)
NEUTROPHILS NFR BLD: 53.2 %
PLATELET # BLD AUTO: 202 K/UL (ref 135–450)
PMV BLD AUTO: 7.6 FL (ref 5–10.5)
POTASSIUM SERPL-SCNC: 4.6 MMOL/L (ref 3.5–5.1)
PROT SERPL-MCNC: 6.5 G/DL (ref 6.4–8.2)
PROTHROMBIN TIME: 14.1 SEC (ref 11.5–14.8)
PROTHROMBIN TIME: 15.6 SEC (ref 11.5–14.8)
RBC # BLD AUTO: 4.12 M/UL (ref 4–5.2)
SODIUM SERPL-SCNC: 136 MMOL/L (ref 136–145)
TROPONIN, HIGH SENSITIVITY: 29 NG/L (ref 0–14)
TROPONIN, HIGH SENSITIVITY: 45 NG/L (ref 0–14)
TROPONIN, HIGH SENSITIVITY: 50 NG/L (ref 0–14)
WBC # BLD AUTO: 5.9 K/UL (ref 4–11)

## 2024-02-19 PROCEDURE — 96374 THER/PROPH/DIAG INJ IV PUSH: CPT

## 2024-02-19 PROCEDURE — 027034Z DILATION OF CORONARY ARTERY, ONE ARTERY WITH DRUG-ELUTING INTRALUMINAL DEVICE, PERCUTANEOUS APPROACH: ICD-10-PCS | Performed by: INTERNAL MEDICINE

## 2024-02-19 PROCEDURE — 2060000000 HC ICU INTERMEDIATE R&B

## 2024-02-19 PROCEDURE — 36415 COLL VENOUS BLD VENIPUNCTURE: CPT

## 2024-02-19 PROCEDURE — 93010 ELECTROCARDIOGRAM REPORT: CPT | Performed by: INTERNAL MEDICINE

## 2024-02-19 PROCEDURE — 85520 HEPARIN ASSAY: CPT

## 2024-02-19 PROCEDURE — 6360000002 HC RX W HCPCS: Performed by: INTERNAL MEDICINE

## 2024-02-19 PROCEDURE — 84484 ASSAY OF TROPONIN QUANT: CPT

## 2024-02-19 PROCEDURE — C1725 CATH, TRANSLUMIN NON-LASER: HCPCS | Performed by: INTERNAL MEDICINE

## 2024-02-19 PROCEDURE — 71045 X-RAY EXAM CHEST 1 VIEW: CPT

## 2024-02-19 PROCEDURE — 99223 1ST HOSP IP/OBS HIGH 75: CPT | Performed by: INTERNAL MEDICINE

## 2024-02-19 PROCEDURE — 6360000002 HC RX W HCPCS

## 2024-02-19 PROCEDURE — 85610 PROTHROMBIN TIME: CPT

## 2024-02-19 PROCEDURE — B2111ZZ FLUOROSCOPY OF MULTIPLE CORONARY ARTERIES USING LOW OSMOLAR CONTRAST: ICD-10-PCS | Performed by: INTERNAL MEDICINE

## 2024-02-19 PROCEDURE — 85025 COMPLETE CBC W/AUTO DIFF WBC: CPT

## 2024-02-19 PROCEDURE — 93458 L HRT ARTERY/VENTRICLE ANGIO: CPT

## 2024-02-19 PROCEDURE — 2709999900 HC NON-CHARGEABLE SUPPLY: Performed by: INTERNAL MEDICINE

## 2024-02-19 PROCEDURE — B2151ZZ FLUOROSCOPY OF LEFT HEART USING LOW OSMOLAR CONTRAST: ICD-10-PCS | Performed by: INTERNAL MEDICINE

## 2024-02-19 PROCEDURE — C9600 PERC DRUG-EL COR STENT SING: HCPCS

## 2024-02-19 PROCEDURE — 2580000003 HC RX 258: Performed by: INTERNAL MEDICINE

## 2024-02-19 PROCEDURE — 85347 COAGULATION TIME ACTIVATED: CPT

## 2024-02-19 PROCEDURE — 6370000000 HC RX 637 (ALT 250 FOR IP): Performed by: INTERNAL MEDICINE

## 2024-02-19 PROCEDURE — 99285 EMERGENCY DEPT VISIT HI MDM: CPT

## 2024-02-19 PROCEDURE — 2580000003 HC RX 258: Performed by: FAMILY MEDICINE

## 2024-02-19 PROCEDURE — 6370000000 HC RX 637 (ALT 250 FOR IP): Performed by: FAMILY MEDICINE

## 2024-02-19 PROCEDURE — 93005 ELECTROCARDIOGRAM TRACING: CPT | Performed by: INTERNAL MEDICINE

## 2024-02-19 PROCEDURE — C1769 GUIDE WIRE: HCPCS | Performed by: INTERNAL MEDICINE

## 2024-02-19 PROCEDURE — 4A023N7 MEASUREMENT OF CARDIAC SAMPLING AND PRESSURE, LEFT HEART, PERCUTANEOUS APPROACH: ICD-10-PCS | Performed by: INTERNAL MEDICINE

## 2024-02-19 PROCEDURE — 93356 MYOCRD STRAIN IMG SPCKL TRCK: CPT

## 2024-02-19 PROCEDURE — C1887 CATHETER, GUIDING: HCPCS | Performed by: INTERNAL MEDICINE

## 2024-02-19 PROCEDURE — C1894 INTRO/SHEATH, NON-LASER: HCPCS | Performed by: INTERNAL MEDICINE

## 2024-02-19 PROCEDURE — 80053 COMPREHEN METABOLIC PANEL: CPT

## 2024-02-19 PROCEDURE — 85730 THROMBOPLASTIN TIME PARTIAL: CPT

## 2024-02-19 PROCEDURE — 92921 HC PRQ CARDIAC ANGIO ADDL ART: CPT

## 2024-02-19 PROCEDURE — 93005 ELECTROCARDIOGRAM TRACING: CPT | Performed by: EMERGENCY MEDICINE

## 2024-02-19 PROCEDURE — C1874 STENT, COATED/COV W/DEL SYS: HCPCS | Performed by: INTERNAL MEDICINE

## 2024-02-19 PROCEDURE — 6360000002 HC RX W HCPCS: Performed by: EMERGENCY MEDICINE

## 2024-02-19 PROCEDURE — 93306 TTE W/DOPPLER COMPLETE: CPT

## 2024-02-19 PROCEDURE — 2500000003 HC RX 250 WO HCPCS

## 2024-02-19 RX ORDER — SODIUM CHLORIDE 9 MG/ML
INJECTION, SOLUTION INTRAVENOUS PRN
Status: DISCONTINUED | OUTPATIENT
Start: 2024-02-19 | End: 2024-02-20 | Stop reason: SDUPTHER

## 2024-02-19 RX ORDER — ASPIRIN 81 MG/1
81 TABLET ORAL DAILY
Status: DISCONTINUED | OUTPATIENT
Start: 2024-02-19 | End: 2024-02-20 | Stop reason: HOSPADM

## 2024-02-19 RX ORDER — ACETAMINOPHEN 325 MG/1
650 TABLET ORAL EVERY 4 HOURS PRN
Status: DISCONTINUED | OUTPATIENT
Start: 2024-02-19 | End: 2024-02-19 | Stop reason: SDUPTHER

## 2024-02-19 RX ORDER — ATORVASTATIN CALCIUM 40 MG/1
80 TABLET, FILM COATED ORAL NIGHTLY
Status: DISCONTINUED | OUTPATIENT
Start: 2024-02-19 | End: 2024-02-19

## 2024-02-19 RX ORDER — SODIUM CHLORIDE 9 MG/ML
INJECTION, SOLUTION INTRAVENOUS PRN
Status: DISCONTINUED | OUTPATIENT
Start: 2024-02-19 | End: 2024-02-20 | Stop reason: HOSPADM

## 2024-02-19 RX ORDER — MIDAZOLAM HYDROCHLORIDE 5 MG/ML
INJECTION, SOLUTION INTRAMUSCULAR; INTRAVENOUS
Status: COMPLETED | OUTPATIENT
Start: 2024-02-19 | End: 2024-02-19

## 2024-02-19 RX ORDER — ROPINIROLE 2 MG/1
2 TABLET, FILM COATED ORAL 4 TIMES DAILY
Status: DISCONTINUED | OUTPATIENT
Start: 2024-02-19 | End: 2024-02-20 | Stop reason: HOSPADM

## 2024-02-19 RX ORDER — HEPARIN SODIUM 1000 [USP'U]/ML
INJECTION, SOLUTION INTRAVENOUS; SUBCUTANEOUS
Status: COMPLETED | OUTPATIENT
Start: 2024-02-19 | End: 2024-02-19

## 2024-02-19 RX ORDER — LORAZEPAM 0.5 MG/1
0.5 TABLET ORAL
Status: DISCONTINUED | OUTPATIENT
Start: 2024-02-19 | End: 2024-02-20 | Stop reason: HOSPADM

## 2024-02-19 RX ORDER — SODIUM CHLORIDE 0.9 % (FLUSH) 0.9 %
5-40 SYRINGE (ML) INJECTION EVERY 12 HOURS SCHEDULED
Status: DISCONTINUED | OUTPATIENT
Start: 2024-02-19 | End: 2024-02-20 | Stop reason: HOSPADM

## 2024-02-19 RX ORDER — POTASSIUM CHLORIDE 7.45 MG/ML
10 INJECTION INTRAVENOUS PRN
Status: DISCONTINUED | OUTPATIENT
Start: 2024-02-19 | End: 2024-02-19

## 2024-02-19 RX ORDER — ACETAMINOPHEN 650 MG/1
650 SUPPOSITORY RECTAL EVERY 6 HOURS PRN
Status: DISCONTINUED | OUTPATIENT
Start: 2024-02-19 | End: 2024-02-20 | Stop reason: HOSPADM

## 2024-02-19 RX ORDER — HYDRALAZINE HYDROCHLORIDE 20 MG/ML
20 INJECTION INTRAMUSCULAR; INTRAVENOUS EVERY 4 HOURS PRN
Status: DISCONTINUED | OUTPATIENT
Start: 2024-02-19 | End: 2024-02-20 | Stop reason: HOSPADM

## 2024-02-19 RX ORDER — POTASSIUM CHLORIDE 20 MEQ/1
40 TABLET, EXTENDED RELEASE ORAL PRN
Status: DISCONTINUED | OUTPATIENT
Start: 2024-02-19 | End: 2024-02-19

## 2024-02-19 RX ORDER — ATORVASTATIN CALCIUM 10 MG/1
10 TABLET, FILM COATED ORAL DAILY
Status: DISCONTINUED | OUTPATIENT
Start: 2024-02-19 | End: 2024-02-20 | Stop reason: HOSPADM

## 2024-02-19 RX ORDER — HEPARIN SODIUM 1000 [USP'U]/ML
2000 INJECTION, SOLUTION INTRAVENOUS; SUBCUTANEOUS PRN
Status: DISCONTINUED | OUTPATIENT
Start: 2024-02-19 | End: 2024-02-20

## 2024-02-19 RX ORDER — ONDANSETRON 4 MG/1
4 TABLET, ORALLY DISINTEGRATING ORAL EVERY 8 HOURS PRN
Status: DISCONTINUED | OUTPATIENT
Start: 2024-02-19 | End: 2024-02-20 | Stop reason: HOSPADM

## 2024-02-19 RX ORDER — HEPARIN SODIUM 10000 [USP'U]/100ML
930 INJECTION, SOLUTION INTRAVENOUS CONTINUOUS
Status: DISCONTINUED | OUTPATIENT
Start: 2024-02-19 | End: 2024-02-20

## 2024-02-19 RX ORDER — ACETAMINOPHEN 325 MG/1
650 TABLET ORAL EVERY 6 HOURS PRN
Status: DISCONTINUED | OUTPATIENT
Start: 2024-02-19 | End: 2024-02-20 | Stop reason: HOSPADM

## 2024-02-19 RX ORDER — FENTANYL CITRATE 50 UG/ML
INJECTION, SOLUTION INTRAMUSCULAR; INTRAVENOUS
Status: COMPLETED | OUTPATIENT
Start: 2024-02-19 | End: 2024-02-19

## 2024-02-19 RX ORDER — ONDANSETRON 2 MG/ML
4 INJECTION INTRAMUSCULAR; INTRAVENOUS EVERY 6 HOURS PRN
Status: DISCONTINUED | OUTPATIENT
Start: 2024-02-19 | End: 2024-02-20 | Stop reason: SDUPTHER

## 2024-02-19 RX ORDER — MAGNESIUM SULFATE IN WATER 40 MG/ML
2000 INJECTION, SOLUTION INTRAVENOUS PRN
Status: DISCONTINUED | OUTPATIENT
Start: 2024-02-19 | End: 2024-02-19

## 2024-02-19 RX ORDER — DULOXETIN HYDROCHLORIDE 20 MG/1
20 CAPSULE, DELAYED RELEASE ORAL DAILY
Status: DISCONTINUED | OUTPATIENT
Start: 2024-02-19 | End: 2024-02-20 | Stop reason: HOSPADM

## 2024-02-19 RX ORDER — SODIUM CHLORIDE 0.9 % (FLUSH) 0.9 %
5-40 SYRINGE (ML) INJECTION PRN
Status: DISCONTINUED | OUTPATIENT
Start: 2024-02-19 | End: 2024-02-20 | Stop reason: HOSPADM

## 2024-02-19 RX ORDER — SODIUM CHLORIDE 0.9 % (FLUSH) 0.9 %
5-40 SYRINGE (ML) INJECTION EVERY 12 HOURS SCHEDULED
OUTPATIENT
Start: 2024-02-19

## 2024-02-19 RX ORDER — ASPIRIN 81 MG/1
81 TABLET, CHEWABLE ORAL DAILY
Status: DISCONTINUED | OUTPATIENT
Start: 2024-02-20 | End: 2024-02-19

## 2024-02-19 RX ORDER — HEPARIN SODIUM 1000 [USP'U]/ML
4000 INJECTION, SOLUTION INTRAVENOUS; SUBCUTANEOUS PRN
Status: DISCONTINUED | OUTPATIENT
Start: 2024-02-19 | End: 2024-02-20

## 2024-02-19 RX ORDER — PANTOPRAZOLE SODIUM 40 MG/1
40 TABLET, DELAYED RELEASE ORAL NIGHTLY
Status: DISCONTINUED | OUTPATIENT
Start: 2024-02-19 | End: 2024-02-19

## 2024-02-19 RX ORDER — SODIUM CHLORIDE 0.9 % (FLUSH) 0.9 %
5-40 SYRINGE (ML) INJECTION PRN
OUTPATIENT
Start: 2024-02-19

## 2024-02-19 RX ORDER — HEPARIN SODIUM 1000 [USP'U]/ML
4000 INJECTION, SOLUTION INTRAVENOUS; SUBCUTANEOUS ONCE
Status: COMPLETED | OUTPATIENT
Start: 2024-02-19 | End: 2024-02-19

## 2024-02-19 RX ORDER — CLOPIDOGREL BISULFATE 75 MG/1
75 TABLET ORAL DAILY
Status: DISCONTINUED | OUTPATIENT
Start: 2024-02-20 | End: 2024-02-20 | Stop reason: HOSPADM

## 2024-02-19 RX ORDER — FUROSEMIDE 20 MG/1
20 TABLET ORAL DAILY
Status: DISCONTINUED | OUTPATIENT
Start: 2024-02-19 | End: 2024-02-20 | Stop reason: HOSPADM

## 2024-02-19 RX ORDER — FAMOTIDINE 20 MG/1
20 TABLET, FILM COATED ORAL 2 TIMES DAILY
Status: DISCONTINUED | OUTPATIENT
Start: 2024-02-19 | End: 2024-02-20 | Stop reason: HOSPADM

## 2024-02-19 RX ORDER — SODIUM CHLORIDE 0.9 % (FLUSH) 0.9 %
5-40 SYRINGE (ML) INJECTION PRN
Status: DISCONTINUED | OUTPATIENT
Start: 2024-02-19 | End: 2024-02-20 | Stop reason: SDUPTHER

## 2024-02-19 RX ORDER — SODIUM CHLORIDE 0.9 % (FLUSH) 0.9 %
5-40 SYRINGE (ML) INJECTION EVERY 12 HOURS SCHEDULED
Status: DISCONTINUED | OUTPATIENT
Start: 2024-02-19 | End: 2024-02-20 | Stop reason: SDUPTHER

## 2024-02-19 RX ORDER — ONDANSETRON 2 MG/ML
4 INJECTION INTRAMUSCULAR; INTRAVENOUS EVERY 6 HOURS PRN
Status: DISCONTINUED | OUTPATIENT
Start: 2024-02-19 | End: 2024-02-20 | Stop reason: HOSPADM

## 2024-02-19 RX ORDER — ASPIRIN 81 MG/1
243 TABLET, CHEWABLE ORAL ONCE
Status: DISCONTINUED | OUTPATIENT
Start: 2024-02-19 | End: 2024-02-20 | Stop reason: HOSPADM

## 2024-02-19 RX ORDER — CLOPIDOGREL BISULFATE 75 MG/1
75 TABLET ORAL DAILY
COMMUNITY

## 2024-02-19 RX ORDER — ATENOLOL 25 MG/1
50 TABLET ORAL DAILY
Status: DISCONTINUED | OUTPATIENT
Start: 2024-02-19 | End: 2024-02-20 | Stop reason: HOSPADM

## 2024-02-19 RX ORDER — LISINOPRIL 10 MG/1
10 TABLET ORAL DAILY
Status: DISCONTINUED | OUTPATIENT
Start: 2024-02-19 | End: 2024-02-20 | Stop reason: HOSPADM

## 2024-02-19 RX ORDER — POLYETHYLENE GLYCOL 3350 17 G/17G
17 POWDER, FOR SOLUTION ORAL DAILY PRN
Status: DISCONTINUED | OUTPATIENT
Start: 2024-02-19 | End: 2024-02-20 | Stop reason: HOSPADM

## 2024-02-19 RX ORDER — CLOPIDOGREL BISULFATE 75 MG/1
300 TABLET ORAL ONCE
Status: COMPLETED | OUTPATIENT
Start: 2024-02-19 | End: 2024-02-19

## 2024-02-19 RX ADMIN — FENTANYL CITRATE 25 MCG: 50 INJECTION, SOLUTION INTRAMUSCULAR; INTRAVENOUS at 15:17

## 2024-02-19 RX ADMIN — ROPINIROLE HYDROCHLORIDE 2 MG: 2 TABLET, FILM COATED ORAL at 21:14

## 2024-02-19 RX ADMIN — Medication 10 ML: at 21:18

## 2024-02-19 RX ADMIN — ATORVASTATIN CALCIUM 10 MG: 10 TABLET, FILM COATED ORAL at 08:10

## 2024-02-19 RX ADMIN — ATENOLOL 50 MG: 25 TABLET ORAL at 08:10

## 2024-02-19 RX ADMIN — CLOPIDOGREL BISULFATE 300 MG: 75 TABLET ORAL at 17:29

## 2024-02-19 RX ADMIN — MIDAZOLAM HYDROCHLORIDE 0.5 MG: 5 INJECTION, SOLUTION INTRAMUSCULAR; INTRAVENOUS at 15:32

## 2024-02-19 RX ADMIN — FENTANYL CITRATE 25 MCG: 50 INJECTION, SOLUTION INTRAMUSCULAR; INTRAVENOUS at 15:32

## 2024-02-19 RX ADMIN — FAMOTIDINE 20 MG: 20 TABLET, FILM COATED ORAL at 08:10

## 2024-02-19 RX ADMIN — HEPARIN SODIUM 5000 UNITS: 1000 INJECTION, SOLUTION INTRAVENOUS; SUBCUTANEOUS at 15:18

## 2024-02-19 RX ADMIN — ACETAMINOPHEN 650 MG: 325 TABLET ORAL at 21:14

## 2024-02-19 RX ADMIN — FUROSEMIDE 20 MG: 20 TABLET ORAL at 08:10

## 2024-02-19 RX ADMIN — HEPARIN SODIUM 1000 UNITS: 1000 INJECTION, SOLUTION INTRAVENOUS; SUBCUTANEOUS at 15:32

## 2024-02-19 RX ADMIN — MIDAZOLAM HYDROCHLORIDE 0.5 MG: 5 INJECTION, SOLUTION INTRAMUSCULAR; INTRAVENOUS at 15:17

## 2024-02-19 RX ADMIN — LISINOPRIL 10 MG: 10 TABLET ORAL at 08:10

## 2024-02-19 RX ADMIN — DULOXETINE HYDROCHLORIDE 20 MG: 20 CAPSULE, DELAYED RELEASE ORAL at 08:10

## 2024-02-19 RX ADMIN — ROPINIROLE HYDROCHLORIDE 2 MG: 2 TABLET, FILM COATED ORAL at 08:09

## 2024-02-19 RX ADMIN — ROPINIROLE HYDROCHLORIDE 2 MG: 2 TABLET, FILM COATED ORAL at 11:43

## 2024-02-19 RX ADMIN — HEPARIN SODIUM AND DEXTROSE 930 UNITS/HR: 10000; 5 INJECTION INTRAVENOUS at 05:24

## 2024-02-19 RX ADMIN — Medication 10 ML: at 21:19

## 2024-02-19 RX ADMIN — FAMOTIDINE 20 MG: 20 TABLET, FILM COATED ORAL at 21:14

## 2024-02-19 RX ADMIN — HEPARIN SODIUM 4000 UNITS: 1000 INJECTION INTRAVENOUS; SUBCUTANEOUS at 05:07

## 2024-02-19 RX ADMIN — ROPINIROLE HYDROCHLORIDE 2 MG: 2 TABLET, FILM COATED ORAL at 17:29

## 2024-02-19 ASSESSMENT — PAIN - FUNCTIONAL ASSESSMENT: PAIN_FUNCTIONAL_ASSESSMENT: NONE - DENIES PAIN

## 2024-02-19 ASSESSMENT — PAIN SCALES - GENERAL: PAINLEVEL_OUTOF10: 5

## 2024-02-19 NOTE — ACP (ADVANCE CARE PLANNING)
Advance Care Planning     General Advance Care Planning (ACP) Conversation    Date of Conversation: 2/19/2024  Conducted with: Patient with Decision Making Capacity.  Daughter at bedside as well    Healthcare Decision Maker:    Today we documented Decision Maker(s) consistent with Legal Next of Kin hierarchy.  2 daughters, Sandra and Marie will be collective proxies.  Patient is decision maker today.    Content/Action Overview:  Has ACP document(s) NOT on file - requested patient to provide  Reviewed DNR/DNI and patient elects DNR order - completed portable DNR form & placed order    Referral to discuss/clarify code status wishes.  We discussed Advance Directives.  She has a living will, but no POA.  Requested copy of Living Will.  Explained order of [facility] in [place].  She understands that without a POA document, her 2 daughters (Sandra and Marie) would share decision making on her behalf.  Daughter at bedside in agreement, states they both share same views so POA may not be pressing.      Discussed code status.  Patient states she wants to be a \"DNR\".  I asked her what it meant and she was able to tell me that \"nothing is done if my heart or breathing stops\".  I asked her how she would feel about potential short-term intubation if could be done to prevent an arrest.  She states \"No, I don't want that\".   She stated in further discussion that when her death is imminent, she wants it to be uninterrupted and peaceful.  I affirmed for her that we could put in an order to make her a DNR/DNI and she could take the DNR form home for her safe keeping.    Provided education about the ability to change code status in the future. Provided education about the \"Limited x 4 No\" status within the hospital walls    Addressed the upcoming cath procedure.  Shared with her that the provider typically will want to reverse the DNR for the procedure.  Per her request, explained reasons why this might want to be done.  We discussed possible

## 2024-02-19 NOTE — CARE COORDINATION
Case Management Assessment  Initial Evaluation    Date/Time of Evaluation: 2/19/2024 1:16 PM  Assessment Completed by: Aurora Friedman RN    If patient is discharged prior to next notation, then this note serves as note for discharge by case management.    Patient Name: Vlad Verma                   YOB: 1940  Diagnosis: NSTEMI (non-ST elevated myocardial infarction) (HCC) [I21.4]                   Date / Time: 2/19/2024  2:38 AM    Patient Admission Status: Inpatient   Readmission Risk (Low < 19, Mod (19-27), High > 27): Readmission Risk Score: 10.3    Current PCP: Eliud Leach MD  PCP verified by CM? Yes    Chart Reviewed: Yes      History Provided by: Patient  Patient Orientation: Alert and Oriented, Person, Place, Situation    Patient Cognition: Alert    Hospitalization in the last 30 days (Readmission):  No    If yes, Readmission Assessment in CM Navigator will be completed.    Advance Directives:      Code Status: Full Code   Patient's Primary Decision Maker is: Legal Next of Kin (pt would like to meet with spiritual care to discuss POA)    Primary Decision Maker: Marie Richter - Child - [phone]    Primary Decision Maker: perla verma - Child - [phone]    Discharge Planning:    Patient lives with: Alone Type of Home: Independent Living  Primary Care Giver: Self  Patient Support Systems include: Children   Current Financial resources: Medicare  Current community resources: None  Current services prior to admission: None            Current DME:              Type of Home Care services:  None    ADLS  Prior functional level: Assistance with the following:, Mobility  Current functional level: Assistance with the following:, Mobility    PT AM-PAC:   /24  OT AM-PAC:   /24    Family can provide assistance at DC: Yes  Would you like Case Management to discuss the discharge plan with any other family members/significant others, and if so, who? Yes  Plans to Return to Present

## 2024-02-19 NOTE — PROCEDURES
CARDIAC CATHETERIZATION REPORT     Procedure Date:  2024  Patient Name: Vlad Carrillo  MRN: [id number] : 1940      : Ayaan Barrett MD      PROCEDURES PERFORMED  Left heart cath via right radial approach  Coronary angiography  Left ventriculogram  Moderate sedation 15 min CPT 74482 (Midazolam: 1 mg, Fentanyl: 50 mcg)  Sedation start time: 1510  Sedation end time: 1555  US guidance for vascular access CPT 50436      INDICATION  NSTEMI    PROCEDURE DESCRIPTION  Risks/benefits/alternatives/outcomes were discussed with patient and/or family in detail and informed consent was obtained. Patient was prepped and draped in the usual sterile fashion. Versed and fentanyl were used for conscious sedation. Then local anaesthetic was applied over right radial puncture site. Using a modified Seldinger technique, the right radial artery was selectively cannulated and a 5Fr Terumo sheath was inserted into right radial artery.  Verapamil and nitroglycerin were administered through the sheath.  Heparin was administered.  Diagnostic 5Fr JL3.5 and JR4 were used to engage left and right coronary arteries respectively and obtain angiogram. LVEDP and left ventriculogram were obtained by using the JR4 diagnostic catheter.  Subsequently, a 5 Occitan XB 3.0 guide catheter was used to perform PCI to mid LAD and diagonal 1.  See below for details of PCI.  At the conclusion of the procedure, a TR band was placed over the puncture site and hemostasis was obtained.  There were no immediate complications. I supervised the sedation with fentanyl and midazolam. An independent trained observer pushed meds at my direction.  We monitored the patient's level of consciousness and vital signs/physiologic status throughout the procedure duration. Patient tolerated the procedure well.      FINDINGS  Left main: 30% stenosis in the distal segment  LAD: 80% use in-stent restenosis in the midsegment treated successfully with PCI

## 2024-02-19 NOTE — PLAN OF CARE
Hospital Medicine Plan of Care Note      Date of Admission: 2/19/2024  Hospital Day: 1    Chief Admission Complaint:  Chest pain      Subjective:  no new c/o.    Presenting Admission History:         \"Vlad Carrillo is a 83 y.o. female with pmh of coronary artery disease x 4 stents, Watchman device who presented with chest pain that started late in the evening yesterday while she was getting ready to go to bed.  She said the pain was dull in nature and continued until she got to the ER.  She denies fever or chills.  Workup in the ER shows elevated troponin level\"     Assessment/Plan:      Current Principal Problem:  NSTEMI (non-ST elevated myocardial infarction) (HCC)      NSTEMI  Coronary artery disease with stents  Atrial fibrillation status post Watchman device  Hypertension  Hyperlipidemia  Restless leg syndrome  Anxiety disorder        Plan:  NSTEMI : Continue IV heparin drip, continue aspirin. Cycle troponin. Cardiology consulted, appreciate recommendations.  Coronary artery disease with stents: Continue aspirin, beta-blocker and statin  Atrial fibrillation status post Watchman device: Stable  Hypertension: Stable  Hyperlipidemia: Stable  Restless leg syndrome:  Anxiety disorder    Physical Exam NOT necessarily Performed:        BP (!) 140/82   Pulse 70   Temp 98 °F (36.7 °C) (Oral)   Resp 18   Ht 1.702 m (5' 7\")   Wt 77.9 kg (171 lb 12.8 oz)   SpO2 97%   BMI 26.91 kg/m²     Diet: ADULT DIET; Clear Liquid; No Caffeine    DVT Prophylaxis: []PPx LMWH  [x]Heparin gtt  []IPC/SCDs  []Eliquis  []Xarelto  []Coumadin  []Other -      Code status: Full Code    PT/OT Eval Status:   [x]NOT yet ordered  []Ordered and Pending   []Seen with Recommendations for:  []Home independently  []Home w/ assist  []HHC  []SNF  []Acute Rehab    Anticipated Discharge Day/Date:  Patient is likely to remain in-house at least until Tues/Wed 20/21 Feb pending clinical course, subspecialty recs and PT/OT eval/recs if/when

## 2024-02-19 NOTE — H&P
V2.0  History and Physical      Name:  Vlad Carrillo /Age/Sex: 1940  (83 y.o. female)   MRN & CSN:  [id number] & [id number] Encounter Date/Time: 2024 6:38 AM EST   Location:  [place] [address] PCP: Eliud Leach MD       Hospital Day: 1    Assessment and Plan:   Vlad Carrillo is a 83 y.o. female with a pmh of coronary artery disease with stents, Watchman device who presents with chest pain and elevated troponin consistent with NSTEMI (non-ST elevated myocardial infarction) ([facility])    Hospital Problems             Last Modified POA    * (Principal) NSTEMI (non-ST elevated myocardial infarction) ([facility]) 2024 Yes   NSTEMI  Coronary artery disease with stents  Atrial fibrillation status post Watchman device  Hypertension  Hyperlipidemia  Restless leg syndrome  Anxiety disorder      Plan:  NSTEMI : Continue IV heparin drip, continue aspirin. Cycle troponin. Cardiology consulted, appreciate recommendations.  Coronary artery disease with stents: Continue aspirin, beta-blocker and statin  Atrial fibrillation status post Watchman device: Stable  Hypertension: Stable  Hyperlipidemia: Stable  Restless leg syndrome:  Anxiety disorder    Disposition:   Current Living situation: Home  Expected Disposition: Home, pending clinical outcomes/further workup  Estimated D/C: 72 hrs    Diet ADULT DIET; Clear Liquid; No Caffeine   DVT Prophylaxis [] Lovenox, []  Heparin, [] SCDs, [] Ambulation,  [] Eliquis, [] Xarelto, [] Coumadin   Code Status Full Code   Surrogate Decision Maker/ POA Unknown     Personally reviewed Lab Studies and Imaging     EKG personally reviewed by me shows normal sinus rhythm at 85 bpm, no acute ST changes.    History from:     patient    History of Present Illness:     Chief Complaint: Chest pain  Vlad Carrillo is a 83 y.o. female with pmh of coronary artery disease x 4 stents, Watchman device who presented with chest pain that started late in the evening yesterday while she was

## 2024-02-19 NOTE — CONSULTS
Consult Placed     Who: JOVANA CAIN   Date:2/19/2024  Time:1447     Electronically signed by Zachariah Bonner on 2/19/2024 at 2:47 PM

## 2024-02-19 NOTE — CONSULTS
Pharmacy to Manage Heparin Infusion per Hospital Nomogram    Dx: Chest pain/Elevated troponin  Pt wt = 77.9 Kg  Baseline aPTT = 26.7 sec    Heparin (weight-based) Infusion: CAD/STEMI/NSTEMI/UA/AFib)   Heparin 60 units/kg IVP bolus (max 4,000 units) followed by Heparin infusion at 12 units/kg/hr (recommended max initial rate: 1000 units/hr).  Recheck anti-Xa (unless aPTT being used) in 6 hours.  Goal anti-Xa 0.3-0.7 IU/mL  Goal aPTT =  seconds.    Pharmacy to manage Heparin - contact for questions.    Heparin 60 units/kg IV x 1 (max 4,000 units), then 12 units/kg/hr (recommended max initial rate 1,000 units/hr). Adjust infusion rate based off anti-Xa results below.     anti-Xa < 0.1    Heparin 60 units/kg bolus  Increase infusion by 4 units/kg/hr  anti-Xa 0.1-0.29 Heparin 30 units/kg bolus Increase infusion by 2 units/kg/hr  anti-Xa 0.3-0.7    No bolus No change   anti-Xa 0.71-0.8   No bolus Decrease infusion by 1 units/kg/hr  anti-Xa 0.81-0.99    No bolus Decrease infusion by 2 units/kg/hr  anti-Xa 1 or more     Hold heparin for 1 hour Decrease infusion by 3 units/kg/hr    Obtain anti-Xa 6 hours after bolus and 6 hours after any dose change until two consecutive therapeutic anti-Xa are achieved- then daily.    Blaine Mariano PharmD  2/19/2024 at 11:08 AM    2/19 1017  Anti Xa 0.41 IU/mL  Continue with current heparin drip rate of 930 units/hr  Recheck Anti Xa in 6 hours at 1600  Blaine Mariano PharmD  2/19/2024 at 11:10 AM

## 2024-02-19 NOTE — ED NOTES
463 @ 1320  
6485- Called [facility]  Per Dr. Andres FUENTES NSTEMI  0503-Called [facility] cardiology  Per Dr. Andres FUENTES Pt wants cardiologist to be aware of admission at [facility]  0510- Dr. Garcia ([facility] cardio)  0515- Jeimy Diehl NP returned page ([facility])  
WITH DILATION performed by Alex Miranda MD at [facility] ENDOSCOPY       Her recent abnormal labs were:    Labs Reviewed   TROPONIN - Abnormal; Notable for the following components:       Result Value    Troponin, High Sensitivity 29 (*)     All other components within normal limits   COMPREHENSIVE METABOLIC PANEL W/ REFLEX TO MG FOR LOW K - Abnormal; Notable for the following components:    Glucose 136 (*)     BUN 21 (*)     Creatinine <0.5 (*)     All other components within normal limits   PROTIME-INR - Abnormal; Notable for the following components:    Protime 15.6 (*)     INR 1.24 (*)     All other components within normal limits   TROPONIN - Abnormal; Notable for the following components:    Troponin, High Sensitivity 45 (*)     All other components within normal limits   CBC WITH AUTO DIFFERENTIAL   PROTIME-INR   APTT   ANTI-XA, UNFRACTIONATED HEPARIN       Her vital signs for the encounter were:    Patient Vitals for the past 24 hrs:   BP Temp Temp src Pulse Resp SpO2 Height Weight   02/19/24 0530 (!) 140/74 -- -- 71 -- 99 % -- --   02/19/24 0500 (!) 158/85 -- -- 75 14 99 % -- --   02/19/24 0345 129/65 -- -- 72 -- 97 % -- --   02/19/24 0315 121/69 -- -- 78 -- 96 % -- --   02/19/24 0254 137/80 97.7 °F (36.5 °C) Oral 84 12 98 % -- --   02/19/24 0245 -- 97.7 °F (36.5 °C) Oral -- -- -- 1.702 m (5' 7\") 77.9 kg (171 lb 12.8 oz)        She has the following lines:    Peripheral IV 02/19/24 Left;Ventral Forearm (Active)       She has received the following medications:    Medications   heparin (porcine) injection 4,000 Units (has no administration in time range)   heparin (porcine) injection 2,000 Units (has no administration in time range)   heparin 25,000 units in dextrose 5% 250 mL (premix) infusion (930 Units/hr IntraVENous New Bag 2/19/24 0524)   sodium chloride flush 0.9 % injection 5-40 mL (has no administration in time range)   sodium chloride flush 0.9 % injection 5-40 mL (has no administration in time

## 2024-02-19 NOTE — CONSULTS
[facility] - INITIAL CONSULTATION        CHIEF COMPLAINT  Chest pain, elevated troponin      HISTORY OF PRESENTING ILLNESS  Vlad Carrillo is a 83 y.o. patient with history of A-fib not on anticoagulation, Watchman device placement, CAD status post PCI, hypertension, hyperlipidemia, TIA, carotid disease who presented to the hospital with complaints of new onset chest discomfort.  She reports preparing for bed last night when she developed sudden onset, moderate intensity substernal pressure.  She is unable to recall having similar episodes in the recent past and is not sure if these episodes are similar to what she had before her previous PCIs.  She took 3 nitroglycerin tablets back-to-back following which she had near resolution of the symptoms.  She has not had recurrence of symptoms since but she was concerned enough about her heart that she presented to the hospital.  In the hospital her cardiac markers were found to be mildly elevated and trending up so IV heparin was started.  Currently she is comfortable without significant chest pain.  She denies any recent health issues including cold, COVID, flu symptoms or other unusual health issues.  She denies edema, orthopnea, lightheadedness, palpitations, syncope in the recent days.      PAST MEDICAL HISTORY   has a past medical history of A-fib (HCC), Acid reflux, Arthritis, Atrial fibrillation (HCC), CAD (coronary artery disease), Diarrhea, Dysphagia, Fatty liver, Hyperlipidemia, Hypertension, Prolonged emergence from general anesthesia, Restless leg, Stomach ulcer, and TIA (transient ischemic attack).    SURGICAL HISTORY   has a past surgical history that includes Cholecystectomy; lipoma resection; Coronary angioplasty with stent (2014); Colonoscopy; Dilation and curettage of uterus (N/A, 04/02/2019); Carotid endarterectomy (Right, 1995); Carotid endarterectomy (Left); eye surgery; Upper gastrointestinal endoscopy (N/A, 2/10/2023); Upper

## 2024-02-19 NOTE — ED PROVIDER NOTES
[facility]  ED     EMERGENCY DEPARTMENT ENCOUNTER            Pt Name: Vlad Carrillo   MRN: [id number]   Birthdate 1940   Date of evaluation: 2/19/2024   Provider: Gabriella Campos MD   PCP: Eliud Leach MD   Note Started: 2:52 AM EST 2/19/24          CHIEF COMPLAINT     Chief Complaint   Patient presents with    Chest Pain     Chest pain from [facility] she took 3 doses of nitro at home with relief. pt has hx of a-fib. Had the watchmen procedure. Squad gave 325 mg asa on the way here.               HISTORY OF PRESENT ILLNESS:           Vlad Carrillo is a 83 y.o. female who presents to the emergency room with a chief complaint of chest pain. Started while getting ready for bed. Mid chest. Radiated to the neck about 11 pm. Took 3 nitroglycerin then the pain subsided. She called EMS but by the time they got there she was pain free. She was given 4 baby asa. No SOB. No lightheaded, no diaphoresis. No N/V.     Nursing Notes were all reviewed and agreed with, or any disagreements were addressed in the HPI.     REVIEW OF SYSTEMS :    Positives and Pertinent negatives as per HPI.      MEDICAL HISTORY   has a past medical history of A-fib (HCC), Acid reflux, Arthritis, Atrial fibrillation (HCC), CAD (coronary artery disease), Diarrhea, Dysphagia (03/2023), Fatty liver, Hyperlipidemia, Hypertension, Prolonged emergence from general anesthesia, Restless leg, Stomach ulcer, and TIA (transient ischemic attack) (1995).    Past Surgical History:   Procedure Laterality Date    CAROTID ENDARTERECTOMY Right 1995    CAROTID ENDARTERECTOMY Left     CHOLECYSTECTOMY      COLONOSCOPY      CORONARY ANGIOPLASTY WITH STENT PLACEMENT  2014    X4    DILATION AND CURETTAGE OF UTERUS N/A 04/02/2019    HYSTEROSCOPY DILATION AND CURETTAGE POLYP REMOVAL performed by Zahira Wilkes MD at [facility] OR    EYE SURGERY      LIPOMA RESECTION      LIPOMA REMOVAL SHOULDER    UPPER

## 2024-02-19 NOTE — DISCHARGE INSTRUCTIONS
Cath Labs at  [facility]   Discharge Instructions        2/19/2024  Vlad Carrillo   Date of Birth 1940       Activity:  No driving for 24 hours.  In 24 hours you may remove dressing and shower, wash site gently with soap and water and leave open to air  Avoid submerging your arm in sitting water for 5 days.  Do not use your right hand for 24 hours, then  No lifting more than 5 pounds for 5 days.   No lotions, powders, or ointments near site for 5 days.   No work/school for 5 days unless instructed otherwise by your cardiologist.    Diet:   Resume previous diet, if a cardiac diet is specified you will receive a handout with  general guidelines.   Drink extra non-alcoholic/decaffienated fluids for first 24 hours after your procedure.    Arm Management:  If bleeding occurs from the site or a hematoma (lump) begins to increase in size, apply pressure directly over the site, call 911 to return to the hospital.    Special Instructions:  Report any coolness or numbness in the arm  Report any chills, fever, itching, red bumps or rash   Report any of the following to the MD: drainage from the site, redness and/or swelling at the site, increased tenderness at the site   If you are currently taking Metformin or Metformin combination medications for Diabetes, hold your dose for 48 hours after your procedure.  Consult your Cardiologist before taking any NSAIDS, vitamin supplements, estrogen, or estrogen plus progestin.  Do not stop taking Plavix, Brilinta or Effient, without first consulting your cardiologist.    Sedation Discharge Instructions:  For the next 24 hours do not drive a car, operate machinery, power tools or kitchen appliances.    Do not drink alcohol; including beer or wine.    Do not make any important decisions or sign any important papers.  For the next 24 hours you can expect drowsiness, light-headed or dizziness, nausea/ vomiting, inability to concentrate, fatigue and desire to sleep.  We

## 2024-02-19 NOTE — PRE SEDATION
Sedation Pre-Procedure Note    Patient Name: Vlad Carrillo   YOB: 1940  Room/Bed: 0358/0358-01  Medical Record Number: [id number]  Date: 2/19/2024   Time: 3:00 PM       Indication: NSTEMI    Consent: I have discussed with the patient and/or the patient representative the indication, alternatives, and the possible risks and/or complications of the planned procedure and the anesthesia methods. The patient and/or patient representative appear to understand and agree to proceed.    Vital Signs:   Vitals:    02/19/24 1130   BP: 129/74   Pulse: 62   Resp: 18   Temp: 97.6 °F (36.4 °C)   SpO2: 99%       Past Medical History:   has a past medical history of A-fib (HCC), Acid reflux, Arthritis, Atrial fibrillation (HCC), CAD (coronary artery disease), Diarrhea, Dysphagia, Fatty liver, Hyperlipidemia, Hypertension, Prolonged emergence from general anesthesia, Restless leg, Stomach ulcer, and TIA (transient ischemic attack).    Past Surgical History:   has a past surgical history that includes Cholecystectomy; lipoma resection; Coronary angioplasty with stent (2014); Colonoscopy; Dilation and curettage of uterus (N/A, 04/02/2019); Carotid endarterectomy (Right, 1995); Carotid endarterectomy (Left); eye surgery; Upper gastrointestinal endoscopy (N/A, 2/10/2023); Upper gastrointestinal endoscopy (N/A, 3/17/2023); and Upper gastrointestinal endoscopy (N/A, 9/8/2023).    Medications:   Scheduled Meds:    sodium chloride flush  5-40 mL IntraVENous 2 times per day    aspirin  81 mg Oral Daily    atenolol  50 mg Oral Daily    DULoxetine  20 mg Oral Daily    famotidine  20 mg Oral BID    furosemide  20 mg Oral Daily    lisinopril  10 mg Oral Daily    atorvastatin  10 mg Oral Daily    rOPINIRole  2 mg Oral 4x Daily    sodium chloride flush  5-40 mL IntraVENous 2 times per day    aspirin  243 mg Oral Once     Continuous Infusions:    heparin (PORCINE) Infusion 930 Units/hr (02/19/24 0563)    sodium chloride      sodium

## 2024-02-20 VITALS
HEART RATE: 78 BPM | DIASTOLIC BLOOD PRESSURE: 73 MMHG | TEMPERATURE: 97.2 F | BODY MASS INDEX: 26.59 KG/M2 | HEIGHT: 67 IN | SYSTOLIC BLOOD PRESSURE: 134 MMHG | OXYGEN SATURATION: 94 % | WEIGHT: 169.4 LBS | RESPIRATION RATE: 16 BRPM

## 2024-02-20 PROBLEM — Z95.818 PRESENCE OF WATCHMAN LEFT ATRIAL APPENDAGE CLOSURE DEVICE: Status: ACTIVE | Noted: 2023-11-03

## 2024-02-20 PROBLEM — I35.0 NONRHEUMATIC AORTIC VALVE STENOSIS: Status: ACTIVE | Noted: 2024-02-20

## 2024-02-20 PROBLEM — I25.10 ATHEROSCLEROTIC HEART DISEASE OF NATIVE CORONARY ARTERY WITHOUT ANGINA PECTORIS: Status: ACTIVE | Noted: 2023-09-24

## 2024-02-20 PROBLEM — E78.2 MIXED HYPERLIPIDEMIA: Status: ACTIVE | Noted: 2024-02-20

## 2024-02-20 LAB
ANION GAP SERPL CALCULATED.3IONS-SCNC: 9 MMOL/L (ref 3–16)
BASOPHILS # BLD: 0 K/UL (ref 0–0.2)
BASOPHILS NFR BLD: 0.5 %
BUN SERPL-MCNC: 13 MG/DL (ref 7–20)
CALCIUM SERPL-MCNC: 9 MG/DL (ref 8.3–10.6)
CHLORIDE SERPL-SCNC: 103 MMOL/L (ref 99–110)
CO2 SERPL-SCNC: 25 MMOL/L (ref 21–32)
CREAT SERPL-MCNC: <0.5 MG/DL (ref 0.6–1.2)
DEPRECATED RDW RBC AUTO: 15.2 % (ref 12.4–15.4)
EOSINOPHIL # BLD: 0.3 K/UL (ref 0–0.6)
EOSINOPHIL NFR BLD: 4.5 %
GFR SERPLBLD CREATININE-BSD FMLA CKD-EPI: >60 ML/MIN/{1.73_M2}
GLUCOSE SERPL-MCNC: 95 MG/DL (ref 70–99)
HCT VFR BLD AUTO: 36.7 % (ref 36–48)
HGB BLD-MCNC: 12.1 G/DL (ref 12–16)
LYMPHOCYTES # BLD: 1.5 K/UL (ref 1–5.1)
LYMPHOCYTES NFR BLD: 27.5 %
MCH RBC QN AUTO: 29.2 PG (ref 26–34)
MCHC RBC AUTO-ENTMCNC: 33 G/DL (ref 31–36)
MCV RBC AUTO: 88.5 FL (ref 80–100)
MONOCYTES # BLD: 0.7 K/UL (ref 0–1.3)
MONOCYTES NFR BLD: 12.1 %
NEUTROPHILS # BLD: 3.1 K/UL (ref 1.7–7.7)
NEUTROPHILS NFR BLD: 55.4 %
PLATELET # BLD AUTO: 204 K/UL (ref 135–450)
PMV BLD AUTO: 7.4 FL (ref 5–10.5)
POC ACT LR: 308 SEC
POC ACT LR: >400 SEC
POTASSIUM SERPL-SCNC: 3.6 MMOL/L (ref 3.5–5.1)
RBC # BLD AUTO: 4.14 M/UL (ref 4–5.2)
SODIUM SERPL-SCNC: 137 MMOL/L (ref 136–145)
WBC # BLD AUTO: 5.6 K/UL (ref 4–11)

## 2024-02-20 PROCEDURE — 2580000003 HC RX 258: Performed by: INTERNAL MEDICINE

## 2024-02-20 PROCEDURE — 80048 BASIC METABOLIC PNL TOTAL CA: CPT

## 2024-02-20 PROCEDURE — 6370000000 HC RX 637 (ALT 250 FOR IP): Performed by: NURSE PRACTITIONER

## 2024-02-20 PROCEDURE — 2580000003 HC RX 258: Performed by: FAMILY MEDICINE

## 2024-02-20 PROCEDURE — 6370000000 HC RX 637 (ALT 250 FOR IP): Performed by: INTERNAL MEDICINE

## 2024-02-20 PROCEDURE — 99232 SBSQ HOSP IP/OBS MODERATE 35: CPT | Performed by: NURSE PRACTITIONER

## 2024-02-20 PROCEDURE — 85025 COMPLETE CBC W/AUTO DIFF WBC: CPT

## 2024-02-20 PROCEDURE — 6370000000 HC RX 637 (ALT 250 FOR IP): Performed by: FAMILY MEDICINE

## 2024-02-20 RX ORDER — POTASSIUM CHLORIDE 20 MEQ/1
20 TABLET, EXTENDED RELEASE ORAL ONCE
Status: COMPLETED | OUTPATIENT
Start: 2024-02-20 | End: 2024-02-20

## 2024-02-20 RX ADMIN — ROPINIROLE HYDROCHLORIDE 2 MG: 2 TABLET, FILM COATED ORAL at 08:55

## 2024-02-20 RX ADMIN — ROPINIROLE HYDROCHLORIDE 2 MG: 2 TABLET, FILM COATED ORAL at 05:19

## 2024-02-20 RX ADMIN — Medication 10 ML: at 08:57

## 2024-02-20 RX ADMIN — POTASSIUM CHLORIDE 20 MEQ: 1500 TABLET, EXTENDED RELEASE ORAL at 10:49

## 2024-02-20 RX ADMIN — DULOXETINE HYDROCHLORIDE 20 MG: 20 CAPSULE, DELAYED RELEASE ORAL at 09:05

## 2024-02-20 RX ADMIN — ATORVASTATIN CALCIUM 10 MG: 10 TABLET, FILM COATED ORAL at 08:55

## 2024-02-20 RX ADMIN — FAMOTIDINE 20 MG: 20 TABLET, FILM COATED ORAL at 08:55

## 2024-02-20 RX ADMIN — FUROSEMIDE 20 MG: 20 TABLET ORAL at 08:55

## 2024-02-20 RX ADMIN — ATENOLOL 50 MG: 25 TABLET ORAL at 08:55

## 2024-02-20 RX ADMIN — LISINOPRIL 10 MG: 10 TABLET ORAL at 08:55

## 2024-02-20 RX ADMIN — ASPIRIN 81 MG: 81 TABLET, COATED ORAL at 08:55

## 2024-02-20 RX ADMIN — CLOPIDOGREL BISULFATE 75 MG: 75 TABLET ORAL at 08:55

## 2024-02-20 NOTE — PROGRESS NOTES
[facility]   Daily Progress Note    Admit Date:  2/19/2024  HPI:    Chief Complaint   Patient presents with    Chest Pain     Chest pain from [facility] she took 3 doses of nitro at home with relief. pt has hx of a-fib. Had the watchmen procedure. Squad gave 325 mg asa on the way here.        Vlad Carrillo presented with sudden onset substernal chest pressure, improvement with sublingual nitroglycerin.    Cardiology consulted for chest pain, CAD, elevated troponin    PMH: CAD, PCI-LAD, PAF s/p Watchman, hypertension, hyperlipidemia, history of TIA, carotid vascular disease (right).    She was treated for NSTEMI likely type I with IV heparin then underwent cardiac catheterization with PCI to the LAD and first diagonal branch    Subjective:  Ms. Carrillo sitting up on side of bed, denies any further chest pain or shortness of breath.  Denies any palpitations or lightheadedness.  No complications from procedure.    Objective:   Patient Vitals for the past 24 hrs:   BP Temp Temp src Pulse Resp SpO2 Weight   02/20/24 0855 130/70 -- -- 80 -- -- --   02/20/24 0500 114/63 98.2 °F (36.8 °C) Oral 68 18 -- --   02/20/24 0035 127/69 97.8 °F (36.6 °C) Oral 70 18 96 % 76.8 kg (169 lb 6.4 oz)   02/19/24 1919 100/61 98.1 °F (36.7 °C) Oral 62 17 97 % --   02/19/24 1852 113/62 -- -- 71 -- -- --   02/19/24 1745 (!) 166/81 -- -- 65 17 -- --   02/19/24 1730 (!) 187/78 -- -- 60 17 -- --   02/19/24 1700 (!) 173/76 97.8 °F (36.6 °C) Oral 62 17 99 % --   02/19/24 1632 (!) 167/76 -- -- 66 17 99 % --   02/19/24 1617 (!) 178/84 -- -- 65 18 98 % --   02/19/24 1612 (!) 175/82 -- -- 67 16 100 % --   02/19/24 1130 129/74 97.6 °F (36.4 °C) Oral 62 18 99 % --       Intake/Output Summary (Last 24 hours) at 2/20/2024 1033  Last data filed at 2/20/2024 0850  Gross per 24 hour   Intake 927 ml   Output 300 ml   Net 627 ml     Wt Readings from Last 3 Encounters:   02/20/24 76.8 kg (169 lb 6.4 oz)   09/24/23 77.6 kg (171 lb 
Spoke to Christiano ROBERTSON from [facility] who will bring belongings from 358 to 449. [facility] updated regarding upcoming transfer.    1632 Report given to AILYN Castellon on C4  
This RN transferred patient to [facility] with granddaughter at bedside. C4 RN Nanci updated regarding Plavix order.  
This nurse provided verbal and written discharge instructions to patient. Follow up with Normal Cardiologist and PCP within 1 to 2 weeks after today. Education written and verbal provided on discharge/home instructions post Cath. Patient stated verbal understanding. This nurse took patient down to family/granddaughter personal vehicle to ride home.     
rate   [] Critical electrolyte abnormalities requiring IV replacement and close serial monitoring  [] Insulin - monitoring FSBS for Hypoglycemic ADR  [] Other -    [] Change in code status:    [] Decision to escalate care:    [] Major surgery/procedure with associated risk factors:    ----------------------------------------------------------------------  C. Data (any 2)  [] Discussed management of the case with:    [x] Discussed the discharge plan in detail with case mgt including timing/barriers to discharge, need for support services and placement decision   [x] Imaging personally reviewed and interpreted, includes:   [x] Telemetry monitoring w/ NSR   [x] Data Review (any 3)  [] Collateral history obtained from:    [x] All available Consultant notes from yesterday/today were reviewed  [x] All current labs were reviewed and interpreted for clinical significance   [x] Appropriate follow-up labs were ordered    Medications:  Personally reviewed in detail in conjunction w/ labs as documented for evidence of drug toxicity.     Infusion Medications    sodium chloride      sodium chloride      sodium chloride       Scheduled Medications    sodium chloride flush  5-40 mL IntraVENous 2 times per day    aspirin  81 mg Oral Daily    atenolol  50 mg Oral Daily    DULoxetine  20 mg Oral Daily    famotidine  20 mg Oral BID    furosemide  20 mg Oral Daily    lisinopril  10 mg Oral Daily    atorvastatin  10 mg Oral Daily    rOPINIRole  2 mg Oral 4x Daily    sodium chloride flush  5-40 mL IntraVENous 2 times per day    aspirin  243 mg Oral Once    clopidogrel  75 mg Oral Daily     PRN Meds: sodium chloride flush, sodium chloride, ondansetron **OR** ondansetron, acetaminophen **OR** acetaminophen, polyethylene glycol, perflutren lipid microspheres, sodium chloride flush, sodium chloride, ondansetron, LORazepam, sodium chloride, hydrALAZINE     Labs:  Personally reviewed and interpreted for clinical significance.     Recent Labs

## 2024-02-21 NOTE — DISCHARGE SUMMARY
controlled on home Statin. Continued.  F/U w/ PCP outpt for medication initiation/adjustment as needed.      Afib - chronic paroxysmal of unspecified and clinically unable to determine etiology.  Normally rate controlled on BBlocker - continued.  Anticoagulated at baseline on home Coumadin due to secondary hypercoaguable state due to Afib - continued.  S/P prior Watchman    Physical Exam Performed:      /73   Pulse 78   Temp 97.2 °F (36.2 °C) (Oral)   Resp 16   Ht 1.702 m (5' 7\")   Wt 76.8 kg (169 lb 6.4 oz)   SpO2 94%   BMI 26.53 kg/m²       General appearance:  No apparent distress, appears stated age and cooperative.  Respiratory:  Normal respiratory effort.   Cardiovascular:  Regular rate and rhythm.  Abdomen:  Soft, non-tender, non-distended.  Musculoskeletal:  No edema  Neurologic:  Non-focal  Psychiatric:  Alert and oriented    Patient Discharge Instructions:      Follow up:    1.  Primary Care Provider Eliud Leach MD in the next 1-2 weeks.      The patient was seen and examined on day of discharge and this discharge summary is in conjunction with any daily progress note from day of discharge. Time spent on discharge: 31 minutes in the examination, evaluation, counseling and review of medications and discharge plan.    ------------------------------------------------------------------------------------------------------------------------------------------------------    Discharge Medications:   Discharge Medication List as of 2/20/2024  2:45 PM        Discharge Medication List as of 2/20/2024  2:45 PM        Discharge Medication List as of 2/20/2024  2:45 PM        CONTINUE these medications which have NOT CHANGED    Details   clopidogrel (PLAVIX) 75 MG tablet Take 1 tablet by mouth dailyHistorical Med      Milk Thistle 500 MG CAPS Take 1 capsule by mouth dailyHistorical Med      DULoxetine (CYMBALTA) 20 MG extended release capsule Take 1 capsule by mouth dailyHistorical Med

## 2024-03-04 ENCOUNTER — APPOINTMENT (OUTPATIENT)
Dept: CT IMAGING | Age: 84
End: 2024-03-04
Payer: MEDICARE

## 2024-03-04 ENCOUNTER — HOSPITAL ENCOUNTER (EMERGENCY)
Age: 84
Discharge: HOME | End: 2024-03-04
Payer: MEDICARE

## 2024-03-04 VITALS
DIASTOLIC BLOOD PRESSURE: 78 MMHG | OXYGEN SATURATION: 96 % | RESPIRATION RATE: 18 BRPM | TEMPERATURE: 97.7 F | BODY MASS INDEX: 26.53 KG/M2 | WEIGHT: 169 LBS | HEIGHT: 67 IN | HEART RATE: 70 BPM | SYSTOLIC BLOOD PRESSURE: 149 MMHG

## 2024-03-04 DIAGNOSIS — R10.31 ABDOMINAL PAIN, RIGHT LOWER QUADRANT: Primary | ICD-10-CM

## 2024-03-04 LAB
ALBUMIN SERPL-MCNC: 3.9 G/DL (ref 3.4–5)
ALBUMIN/GLOB SERPL: 1.4 {RATIO} (ref 1.1–2.2)
ALP SERPL-CCNC: 81 U/L (ref 40–129)
ALT SERPL-CCNC: 10 U/L (ref 10–40)
ANION GAP SERPL CALCULATED.3IONS-SCNC: 8 MMOL/L (ref 3–16)
AST SERPL-CCNC: 17 U/L (ref 15–37)
BASOPHILS # BLD: 0 K/UL (ref 0–0.2)
BASOPHILS NFR BLD: 0.8 %
BILIRUB SERPL-MCNC: 0.6 MG/DL (ref 0–1)
BUN SERPL-MCNC: 15 MG/DL (ref 7–20)
CALCIUM SERPL-MCNC: 9.4 MG/DL (ref 8.3–10.6)
CHLORIDE SERPL-SCNC: 101 MMOL/L (ref 99–110)
CO2 SERPL-SCNC: 28 MMOL/L (ref 21–32)
CREAT SERPL-MCNC: <0.5 MG/DL (ref 0.6–1.2)
DEPRECATED RDW RBC AUTO: 15.3 % (ref 12.4–15.4)
EOSINOPHIL # BLD: 0.3 K/UL (ref 0–0.6)
EOSINOPHIL NFR BLD: 7.8 %
GFR SERPLBLD CREATININE-BSD FMLA CKD-EPI: >60 ML/MIN/{1.73_M2}
GLUCOSE SERPL-MCNC: 98 MG/DL (ref 70–99)
HCT VFR BLD AUTO: 36.3 % (ref 36–48)
HGB BLD-MCNC: 12.1 G/DL (ref 12–16)
LIPASE SERPL-CCNC: 17 U/L (ref 13–60)
LYMPHOCYTES # BLD: 1.4 K/UL (ref 1–5.1)
LYMPHOCYTES NFR BLD: 31.4 %
MCH RBC QN AUTO: 29.4 PG (ref 26–34)
MCHC RBC AUTO-ENTMCNC: 33.4 G/DL (ref 31–36)
MCV RBC AUTO: 88.2 FL (ref 80–100)
MONOCYTES # BLD: 0.4 K/UL (ref 0–1.3)
MONOCYTES NFR BLD: 9.2 %
NEUTROPHILS # BLD: 2.2 K/UL (ref 1.7–7.7)
NEUTROPHILS NFR BLD: 50.8 %
PLATELET # BLD AUTO: 206 K/UL (ref 135–450)
PMV BLD AUTO: 7.6 FL (ref 5–10.5)
POTASSIUM SERPL-SCNC: 3.8 MMOL/L (ref 3.5–5.1)
PROT SERPL-MCNC: 6.6 G/DL (ref 6.4–8.2)
RBC # BLD AUTO: 4.12 M/UL (ref 4–5.2)
SODIUM SERPL-SCNC: 137 MMOL/L (ref 136–145)
WBC # BLD AUTO: 4.3 K/UL (ref 4–11)

## 2024-03-04 PROCEDURE — 6360000004 HC RX CONTRAST MEDICATION: Performed by: PHYSICIAN ASSISTANT

## 2024-03-04 PROCEDURE — 83690 ASSAY OF LIPASE: CPT

## 2024-03-04 PROCEDURE — 80053 COMPREHEN METABOLIC PANEL: CPT

## 2024-03-04 PROCEDURE — 99285 EMERGENCY DEPT VISIT HI MDM: CPT

## 2024-03-04 PROCEDURE — 74177 CT ABD & PELVIS W/CONTRAST: CPT

## 2024-03-04 PROCEDURE — 85025 COMPLETE CBC W/AUTO DIFF WBC: CPT

## 2024-03-04 RX ADMIN — IOPAMIDOL 75 ML: 755 INJECTION, SOLUTION INTRAVENOUS at 18:44

## 2024-03-04 ASSESSMENT — PAIN - FUNCTIONAL ASSESSMENT: PAIN_FUNCTIONAL_ASSESSMENT: NONE - DENIES PAIN

## 2024-03-04 NOTE — ED PROVIDER NOTES
extending into the chest.      Prominent vascular disease.  There is greater than 60% narrowing of the   proximal superior mesenteric artery secondary to atherosclerotic plaque.   Prominent narrowing of the proximal celiac trunk is also noted.      New compression fractures at the T10 and T11 levels when compared to the   prior exam.  Possible developing left sacral insufficiency fracture.      Additional findings noted above.           PROCEDURES  Unless otherwise noted below, none.    ED COURSE/DDx/MDM  History obtained from:  Patient    Vitals:  Vitals:    03/04/24 1728   BP: (!) 149/78   Pulse: 70   Resp: 18   Temp: 97.7 °F (36.5 °C)   TempSrc: Oral   SpO2: 96%   Weight: 76.7 kg (169 lb)   Height: 1.702 m (5' 7\")     Patient received following medications in ED:  Medications   iopamidol (ISOVUE-370) 76 % injection 75 mL (75 mLs IntraVENous Given 3/4/24 1844)       Sepsis Consideration:  Is this patient to be included in the SEP-1 Core Measure due to severe sepsis or septic shock?   No   Exclusion criteria - the patient is NOT to be included for SEP-1 Core Measure due to:  Infection is not suspected    Records Reviewed:   Brief chart review performed without relevant records identified.    Chronic conditions affecting care:   Fib, CAD, dysphagia, hyperlipidemia, hypertension, ulcer, TIA.   has a past medical history of A-fib (HCC), Acid reflux, Arthritis, Atrial fibrillation (HCC), CAD (coronary artery disease), Diarrhea, Dysphagia (03/2023), Fatty liver, Hyperlipidemia, Hypertension, Prolonged emergence from general anesthesia, Restless leg, Stomach ulcer, and TIA (transient ischemic attack) (1995).    Social Determinants:   Patient does not drive although does have transportation to and from appointments.    Consults:   None necessary at this time.    Reassessment:      Patient remains without complaints while here in the emergency department and states that she has had no recurrence of her pain.  States that

## 2024-03-29 ENCOUNTER — HOSPITAL ENCOUNTER (EMERGENCY)
Age: 84
Discharge: HOME | End: 2024-03-29
Payer: MEDICARE

## 2024-03-29 ENCOUNTER — ANESTHESIA (OUTPATIENT)
Dept: ENDOSCOPY | Age: 84
End: 2024-03-29
Payer: MEDICARE

## 2024-03-29 ENCOUNTER — ANESTHESIA EVENT (OUTPATIENT)
Dept: ENDOSCOPY | Age: 84
End: 2024-03-29
Payer: MEDICARE

## 2024-03-29 VITALS
SYSTOLIC BLOOD PRESSURE: 157 MMHG | HEIGHT: 68 IN | TEMPERATURE: 97.3 F | HEART RATE: 97 BPM | RESPIRATION RATE: 16 BRPM | OXYGEN SATURATION: 92 % | BODY MASS INDEX: 25.91 KG/M2 | DIASTOLIC BLOOD PRESSURE: 75 MMHG

## 2024-03-29 DIAGNOSIS — T18.108A FOREIGN BODY IN ESOPHAGUS, INITIAL ENCOUNTER: Primary | ICD-10-CM

## 2024-03-29 LAB
ALBUMIN SERPL-MCNC: 4.4 G/DL (ref 3.4–5)
ALBUMIN/GLOB SERPL: 1.5 {RATIO} (ref 1.1–2.2)
ALP SERPL-CCNC: 91 U/L (ref 40–129)
ALT SERPL-CCNC: 13 U/L (ref 10–40)
ANION GAP SERPL CALCULATED.3IONS-SCNC: 10 MMOL/L (ref 3–16)
AST SERPL-CCNC: 22 U/L (ref 15–37)
BASOPHILS # BLD: 0 K/UL (ref 0–0.2)
BASOPHILS NFR BLD: 0.7 %
BILIRUB SERPL-MCNC: 0.9 MG/DL (ref 0–1)
BUN SERPL-MCNC: 15 MG/DL (ref 7–20)
CALCIUM SERPL-MCNC: 9.9 MG/DL (ref 8.3–10.6)
CHLORIDE SERPL-SCNC: 100 MMOL/L (ref 99–110)
CO2 SERPL-SCNC: 29 MMOL/L (ref 21–32)
CREAT SERPL-MCNC: <0.5 MG/DL (ref 0.6–1.2)
DEPRECATED RDW RBC AUTO: 15.6 % (ref 12.4–15.4)
EOSINOPHIL # BLD: 0.4 K/UL (ref 0–0.6)
EOSINOPHIL NFR BLD: 6.8 %
GFR SERPLBLD CREATININE-BSD FMLA CKD-EPI: >90 ML/MIN/{1.73_M2}
GLUCOSE SERPL-MCNC: 103 MG/DL (ref 70–99)
HCT VFR BLD AUTO: 38.5 % (ref 36–48)
HGB BLD-MCNC: 12.7 G/DL (ref 12–16)
LYMPHOCYTES # BLD: 1.5 K/UL (ref 1–5.1)
LYMPHOCYTES NFR BLD: 26.8 %
MCH RBC QN AUTO: 29.6 PG (ref 26–34)
MCHC RBC AUTO-ENTMCNC: 33.1 G/DL (ref 31–36)
MCV RBC AUTO: 89.5 FL (ref 80–100)
MONOCYTES # BLD: 0.5 K/UL (ref 0–1.3)
MONOCYTES NFR BLD: 9.7 %
NEUTROPHILS # BLD: 3.1 K/UL (ref 1.7–7.7)
NEUTROPHILS NFR BLD: 56 %
PLATELET # BLD AUTO: 211 K/UL (ref 135–450)
PMV BLD AUTO: 7.7 FL (ref 5–10.5)
POTASSIUM SERPL-SCNC: 3.6 MMOL/L (ref 3.5–5.1)
PROT SERPL-MCNC: 7.4 G/DL (ref 6.4–8.2)
RBC # BLD AUTO: 4.3 M/UL (ref 4–5.2)
SODIUM SERPL-SCNC: 139 MMOL/L (ref 136–145)
SPECIMEN STATUS: NORMAL
WBC # BLD AUTO: 5.6 K/UL (ref 4–11)

## 2024-03-29 PROCEDURE — 2500000003 HC RX 250 WO HCPCS: Performed by: NURSE ANESTHETIST, CERTIFIED REGISTERED

## 2024-03-29 PROCEDURE — 85025 COMPLETE CBC W/AUTO DIFF WBC: CPT

## 2024-03-29 PROCEDURE — 2709999900 HC NON-CHARGEABLE SUPPLY: Performed by: INTERNAL MEDICINE

## 2024-03-29 PROCEDURE — 3700000001 HC ADD 15 MINUTES (ANESTHESIA): Performed by: INTERNAL MEDICINE

## 2024-03-29 PROCEDURE — 7100000000 HC PACU RECOVERY - FIRST 15 MIN: Performed by: INTERNAL MEDICINE

## 2024-03-29 PROCEDURE — 3700000000 HC ANESTHESIA ATTENDED CARE: Performed by: INTERNAL MEDICINE

## 2024-03-29 PROCEDURE — 99285 EMERGENCY DEPT VISIT HI MDM: CPT

## 2024-03-29 PROCEDURE — 2580000003 HC RX 258

## 2024-03-29 PROCEDURE — 7100000001 HC PACU RECOVERY - ADDTL 15 MIN: Performed by: INTERNAL MEDICINE

## 2024-03-29 PROCEDURE — 80053 COMPREHEN METABOLIC PANEL: CPT

## 2024-03-29 PROCEDURE — 6360000002 HC RX W HCPCS

## 2024-03-29 PROCEDURE — 2500000003 HC RX 250 WO HCPCS

## 2024-03-29 PROCEDURE — 7100000010 HC PHASE II RECOVERY - FIRST 15 MIN: Performed by: INTERNAL MEDICINE

## 2024-03-29 PROCEDURE — 3609012900 HC EGD FOREIGN BODY REMOVAL: Performed by: INTERNAL MEDICINE

## 2024-03-29 RX ORDER — SODIUM CHLORIDE, SODIUM LACTATE, POTASSIUM CHLORIDE, CALCIUM CHLORIDE 600; 310; 30; 20 MG/100ML; MG/100ML; MG/100ML; MG/100ML
INJECTION, SOLUTION INTRAVENOUS CONTINUOUS PRN
Status: DISCONTINUED | OUTPATIENT
Start: 2024-03-29 | End: 2024-03-29 | Stop reason: SDUPTHER

## 2024-03-29 RX ORDER — GLUCAGON 1 MG/ML
1 KIT INJECTION ONCE
Status: DISCONTINUED | OUTPATIENT
Start: 2024-03-29 | End: 2024-03-29 | Stop reason: HOSPADM

## 2024-03-29 RX ORDER — ROCURONIUM BROMIDE 10 MG/ML
INJECTION, SOLUTION INTRAVENOUS PRN
Status: DISCONTINUED | OUTPATIENT
Start: 2024-03-29 | End: 2024-03-29 | Stop reason: SDUPTHER

## 2024-03-29 RX ORDER — LIDOCAINE HYDROCHLORIDE 20 MG/ML
INJECTION, SOLUTION INFILTRATION; PERINEURAL PRN
Status: DISCONTINUED | OUTPATIENT
Start: 2024-03-29 | End: 2024-03-29 | Stop reason: SDUPTHER

## 2024-03-29 RX ORDER — SUCCINYLCHOLINE CHLORIDE 20 MG/ML
INJECTION INTRAMUSCULAR; INTRAVENOUS PRN
Status: DISCONTINUED | OUTPATIENT
Start: 2024-03-29 | End: 2024-03-29 | Stop reason: SDUPTHER

## 2024-03-29 RX ORDER — ONDANSETRON 2 MG/ML
INJECTION INTRAMUSCULAR; INTRAVENOUS PRN
Status: DISCONTINUED | OUTPATIENT
Start: 2024-03-29 | End: 2024-03-29 | Stop reason: SDUPTHER

## 2024-03-29 RX ORDER — DEXAMETHASONE SODIUM PHOSPHATE 4 MG/ML
INJECTION, SOLUTION INTRA-ARTICULAR; INTRALESIONAL; INTRAMUSCULAR; INTRAVENOUS; SOFT TISSUE PRN
Status: DISCONTINUED | OUTPATIENT
Start: 2024-03-29 | End: 2024-03-29 | Stop reason: SDUPTHER

## 2024-03-29 RX ORDER — PROPOFOL 10 MG/ML
INJECTION, EMULSION INTRAVENOUS PRN
Status: DISCONTINUED | OUTPATIENT
Start: 2024-03-29 | End: 2024-03-29 | Stop reason: SDUPTHER

## 2024-03-29 RX ADMIN — PROPOFOL 100 MG: 10 INJECTION, EMULSION INTRAVENOUS at 16:43

## 2024-03-29 RX ADMIN — ROCURONIUM BROMIDE 30 MG: 50 INJECTION, SOLUTION INTRAVENOUS at 16:54

## 2024-03-29 RX ADMIN — PROPOFOL 30 MG: 10 INJECTION, EMULSION INTRAVENOUS at 16:48

## 2024-03-29 RX ADMIN — DEXAMETHASONE SODIUM PHOSPHATE 10 MG: 4 INJECTION, SOLUTION INTRAMUSCULAR; INTRAVENOUS at 16:48

## 2024-03-29 RX ADMIN — SODIUM CHLORIDE, SODIUM LACTATE, POTASSIUM CHLORIDE, AND CALCIUM CHLORIDE: .6; .31; .03; .02 INJECTION, SOLUTION INTRAVENOUS at 16:38

## 2024-03-29 RX ADMIN — SUCCINYLCHOLINE CHLORIDE 100 MG: 20 INJECTION, SOLUTION INTRAMUSCULAR; INTRAVENOUS at 16:43

## 2024-03-29 RX ADMIN — ONDANSETRON 4 MG: 2 INJECTION INTRAMUSCULAR; INTRAVENOUS at 16:48

## 2024-03-29 RX ADMIN — PROPOFOL 30 MG: 10 INJECTION, EMULSION INTRAVENOUS at 16:46

## 2024-03-29 RX ADMIN — SUGAMMADEX 200 MG: 100 INJECTION, SOLUTION INTRAVENOUS at 17:05

## 2024-03-29 RX ADMIN — LIDOCAINE HYDROCHLORIDE 80 MG: 20 INJECTION, SOLUTION INFILTRATION; PERINEURAL at 16:43

## 2024-03-29 ASSESSMENT — ENCOUNTER SYMPTOMS
SORE THROAT: 0
SINUS PRESSURE: 0
NAUSEA: 0
CHEST TIGHTNESS: 0
CONSTIPATION: 0
DIARRHEA: 0
RHINORRHEA: 0
VOMITING: 0
SINUS PAIN: 0
COUGH: 0
EYE DISCHARGE: 0
EYE REDNESS: 0
ABDOMINAL PAIN: 0
SHORTNESS OF BREATH: 0

## 2024-03-29 ASSESSMENT — PAIN - FUNCTIONAL ASSESSMENT: PAIN_FUNCTIONAL_ASSESSMENT: NONE - DENIES PAIN

## 2024-03-29 NOTE — PROGRESS NOTES
A: Discharge instructions reviewed with patients daughter who acknowledged understanding, patient dressed with help from daughter and was discharged via wheelchair to private car.

## 2024-03-29 NOTE — ED PROVIDER NOTES
[facility]  ED  EMERGENCY DEPARTMENT ENCOUNTER        Pt Name: Vlad Carrillo  MRN: [id number]  Birthdate 1940  Date of evaluation: 3/29/2024  Provider: Sita De La O PA-C  PCP: Eliud Leach MD  Note Started: 3:05 PM EDT 3/29/24      KALPANA. I have evaluated this patient.        CHIEF COMPLAINT       Chief Complaint   Patient presents with    Foreign Body     Pt with hx of getting food stuck pt called Dr. MALLORY Bashir he told her to come over to the ER        HISTORY OF PRESENT ILLNESS: 1 or more Elements     History from : Patient    Limitations to history : None    Vlad Carrillo is a 83 y.o. female with a past medical history of CAD, HTN, HLD, NSTEMI, A-fib, who presents to the emergency room for evaluation of a 1 day history of having a food bolus stuck in her esophagus.  Patient called her GI doctor she has had this happen before and he advised to come into the ER.  Patient is not anticoagulated but does take daily Plavix.  Also takes daily aspirin.  Food is roast beef. Can't keep down water     Nursing Notes were all reviewed and agreed with or any disagreements were addressed in the HPI.    REVIEW OF SYSTEMS :      Review of Systems   Constitutional:  Negative for chills and fever.   HENT: Negative.  Negative for congestion, rhinorrhea, sinus pressure, sinus pain and sore throat.    Eyes:  Negative for discharge, redness and visual disturbance.   Respiratory:  Negative for cough, chest tightness and shortness of breath.    Cardiovascular:  Negative for chest pain and palpitations.   Gastrointestinal:  Negative for abdominal pain, constipation, diarrhea, nausea and vomiting.   Genitourinary:  Negative for difficulty urinating, dysuria and frequency.   Musculoskeletal: Negative.    Skin: Negative.    Neurological: Negative.  Negative for dizziness, weakness, numbness and headaches.   Psychiatric/Behavioral: Negative.     All other systems reviewed and are  abdominal tenderness.   Musculoskeletal:         General: Normal range of motion.      Cervical back: Normal range of motion and neck supple.   Skin:     General: Skin is warm and dry.      Capillary Refill: Capillary refill takes less than 2 seconds.      Coloration: Skin is not pale.   Neurological:      General: No focal deficit present.      Mental Status: She is alert and oriented to person, place, and time.   Psychiatric:         Mood and Affect: Mood normal.         Behavior: Behavior normal.           DIAGNOSTIC RESULTS   LABS:    Labs Reviewed   CBC WITH AUTO DIFFERENTIAL - Abnormal; Notable for the following components:       Result Value    RDW 15.6 (*)     All other components within normal limits   COMPREHENSIVE METABOLIC PANEL W/ REFLEX TO MG FOR LOW K - Abnormal; Notable for the following components:    Glucose 103 (*)     Creatinine <0.5 (*)     All other components within normal limits   EXTRA TUBE, BLOOD BANK       When ordered only abnormal lab results are displayed. All other labs were within normal range or not returned as of this dictation.    EKG: When ordered, EKG's are interpreted by the Emergency Department Physician in the absence of a cardiologist.  Please see their note for interpretation of EKG.    CRITICAL CARE TIME (.cctime)   Because of high probability of sudden clinical deterioration of the patient's condition and risk of further deterioration, critical care time required my full attention to the patient's condition; which included chart data review, documentation, medication ordering, reviewing the patient's old records, reevaluation patient's cardiac, pulmonary and neurological status.  Reevaluation of vital signs. Consultations with ED attending and admitting physician. Ordering, interpreting reviewing diagnostic testing.  Therefore, I personally saw the patient and independently provided 20 minutes of non-concurrent critical care out of the total shared critical care time

## 2024-03-29 NOTE — ED NOTES
ED GI CONSULT  RE-impacted food bolus  1443-Paged [facility]  1503- returned page, transferred to Sita MURRAY

## 2024-03-29 NOTE — PROCEDURES
EGD PROCEDURE NOTE         Esophagogastroduodenoscopy Procedure Note     Patient: Vlad Carrillo MRN: [id number]   YOB: 1940 Age: 83 y.o. Sex: female   Unit: [facility] EMERGENCY DEPT Room/Bed: G1/G-01 Location: [facility]    Admitting Physician:      Primary Care Physician: Eliud Leach MD      DATE OF PROCEDURE: 3/29/2024  PROCEDURE: Esophagogastroduodenoscopy  INDICATION: This is a 83 y.o. year old female who presents today due to swallow  ENDOSCOPIST: Harjit Blanco DO    POSTOPERATIVE DIAGNOSIS: Obstruction of the distal esophagus with food.  Underlying esophageal ulceration above a stricture and a possible diverticulum.  Gastritis    PLAN: Clear liquids today then she can eat a puréed diet  Maintain Protonix as she has been taking it at home.  She will need EGD with dilation in about 2 weeks.      INFORMED CONSENT:  Informed consent for esophagogastoduodenoscopy was obtained.  The benefits and risks including adverse medicine reaction have been explained.  The patient's questions were answered and the patient agreed to proceed.    ASA:  ASA 2 - Patient with mild systemic disease with no functional limitations    SEDATION: MAC     The patient's vital signs, cardiac status, pulmonary status, abdominal status and mental status were stable for the procedure.  The patient's vitals signs and respiratory function as monitored by oxygen saturation were stable throughout    Procedure Details:    The Olympus videoendoscope was inserted into the mouth and carefully passed into the esophagus, through the stomach and to the distal duodenum.  Antegrade and retrograde examination of the upper gi tract was carefully performed.    Findings:   The esophagus and the oropharynx were filled with liquid and fragments of food debris.  This was suctioned the scope was advanced into the esophagus where an increased amount of liquid and fragmented food debris were suctioned until I was  able to reach a food bolus about 10 cm above the GE junction.  This consisted of necrotic debris.  Initially I tried a polyp snare but that just went right through it.  I therefore used a Cobos net was able to take multiple clumps of this compacted food out through the mouth.  Eventually I was able to work around the food bolus into the stomach and dropped the remaining food into the stomach.   The mucosa of the antrum cardia and fundus were irritated.  There was some heme in the cardia there is linear erosions in the antrum and body.  Of the stomach is normal.  The pylorus is patent and of normal contour.  The scope easily advanced into the duodenal bulb and down to the distal duodenum.  Ante-and retrograde exam of the duodenum is normal.  There is noted to be a approximately 3 cm linear ulceration in the distal esophagus then a stricture was noted at the GE junction.  Just proximal to the stricture almost seemed as though there was a diverticulum.  The remainder of the food debris in the esophagus was washed down into the stomach and suctioned out as well as the oropharynx.  Gastric or Duodenal ulcer present: No    Estimated Blood Loss: Less than 50 ml      Signed By: Harjit Blanco DO

## 2024-03-29 NOTE — PROGRESS NOTES
D: Patient here from ENDO via stretcher, taken to bay 5 in PACU, patient is awake, alert, and oriented x 4, denies pain at present, call light is in reach. A:: Assessment completed and documented, dicussed plan of care with patient who agreed.

## 2024-03-29 NOTE — H&P
Duration 02/19/2024 84  ms Final    Q-T Interval 02/19/2024 364  ms Final    QTc Calculation (Bazett) 02/19/2024 430  ms Final    P Axis 02/19/2024 21  degrees Final    R Axis 02/19/2024 -11  degrees Final    T Axis 02/19/2024 50  degrees Final    Diagnosis 02/19/2024 Normal sinus rhythmNormal ECGConfirmed by MEAGAN TIPTON MD ([id number]) on 2/19/2024 5:37:04 PM   Final    Troponin, High Sensitivity 02/19/2024 29 (H)  0 - 14 ng/L Final    WBC 02/19/2024 5.9  4.0 - 11.0 K/uL Final    RBC 02/19/2024 4.12  4.00 - 5.20 M/uL Final    Hemoglobin 02/19/2024 12.0  12.0 - 16.0 g/dL Final    Hematocrit 02/19/2024 36.8  36.0 - 48.0 % Final    MCV 02/19/2024 89.2  80.0 - 100.0 fL Final    MCH 02/19/2024 29.2  26.0 - 34.0 pg Final    MCHC 02/19/2024 32.8  31.0 - 36.0 g/dL Final    RDW 02/19/2024 15.4  12.4 - 15.4 % Final    Platelets 02/19/2024 202  135 - 450 K/uL Final    MPV 02/19/2024 7.6  5.0 - 10.5 fL Final    Neutrophils % 02/19/2024 53.2  % Final    Lymphocytes % 02/19/2024 31.2  % Final    Monocytes % 02/19/2024 12.1  % Final    Eosinophils % 02/19/2024 2.6  % Final    Basophils % 02/19/2024 0.9  % Final    Neutrophils Absolute 02/19/2024 3.1  1.7 - 7.7 K/uL Final    Lymphocytes Absolute 02/19/2024 1.8  1.0 - 5.1 K/uL Final    Monocytes Absolute 02/19/2024 0.7  0.0 - 1.3 K/uL Final    Eosinophils Absolute 02/19/2024 0.2  0.0 - 0.6 K/uL Final    Basophils Absolute 02/19/2024 0.1  0.0 - 0.2 K/uL Final    Sodium 02/19/2024 136  136 - 145 mmol/L Final    Potassium reflex Magnesium 02/19/2024 4.6  3.5 - 5.1 mmol/L Final    Chloride 02/19/2024 100  99 - 110 mmol/L Final    CO2 02/19/2024 26  21 - 32 mmol/L Final    Anion Gap 02/19/2024 10  3 - 16 Final    Glucose 02/19/2024 136 (H)  70 - 99 mg/dL Final    BUN 02/19/2024 21 (H)  7 - 20 mg/dL Final    Creatinine 02/19/2024 <0.5 (L)  0.6 - 1.2 mg/dL Final    Est, Glom Filt Rate 02/19/2024 >60  >60 Final    Calcium 02/19/2024 9.1  8.3 - 10.6 mg/dL Final    Total Protein  02/20/2024 12.1  % Final    Eosinophils % 02/20/2024 4.5  % Final    Basophils % 02/20/2024 0.5  % Final    Neutrophils Absolute 02/20/2024 3.1  1.7 - 7.7 K/uL Final    Lymphocytes Absolute 02/20/2024 1.5  1.0 - 5.1 K/uL Final    Monocytes Absolute 02/20/2024 0.7  0.0 - 1.3 K/uL Final    Eosinophils Absolute 02/20/2024 0.3  0.0 - 0.6 K/uL Final    Basophils Absolute 02/20/2024 0.0  0.0 - 0.2 K/uL Final    Sodium 02/20/2024 137  136 - 145 mmol/L Final    Potassium 02/20/2024 3.6  3.5 - 5.1 mmol/L Final    Chloride 02/20/2024 103  99 - 110 mmol/L Final    CO2 02/20/2024 25  21 - 32 mmol/L Final    Anion Gap 02/20/2024 9  3 - 16 Final    Glucose 02/20/2024 95  70 - 99 mg/dL Final    BUN 02/20/2024 13  7 - 20 mg/dL Final    Creatinine 02/20/2024 <0.5 (L)  0.6 - 1.2 mg/dL Final    Est, Glom Filt Rate 02/20/2024 >60  >60 Final    Calcium 02/20/2024 9.0  8.3 - 10.6 mg/dL Final    POC ACT LR 02/19/2024 308  Not Established sec Final    POC ACT LR 02/19/2024 >400  Not Established sec Final        Imaging:  No orders to display       ASA:3    Mallampati Score: II    Sedation planned:General    Patient in acceptable condition for procedure:Yes    5:16 PM 3/29/2024    Harjit Blanco, DO      Please note that some or all of this record was generated using voice recognition software. If there are any questions about the content of this document, please contact the author as some errors in transcription may have occurred.    The patient and I discussed that this is an elective procedure/surgery. We discussed the risks of the procedure/surgery, including but not limited to what is outlined in the signed informed consent. We also discussed the risk of shun COVID 19 while in the facility. We discussed the increased risk of a bad outcome should the patient contract COVID 19 during the post-procedural/post-operative period, given the patient’s current health condition, chronic conditions, and the added risk of COVID 19 in

## 2024-03-29 NOTE — DISCHARGE INSTRUCTIONS
Patient should stay on clear liquids today then she can eat a puréed diet.  She needs to chew her food well eat slowly.  Stay on Protonix on a daily basis.  Patient is to call Dr. Miranda for follow-up EGD with dilation in about 2 weeks.

## 2024-03-30 NOTE — ANESTHESIA POSTPROCEDURE EVALUATION
Department of Anesthesiology  Postprocedure Note    Patient: Vlad Carrillo  MRN: [id number]  YOB: 1940  Date of evaluation: 3/29/2024    Procedure Summary       Date: 03/29/24 Room / Location: [facility] VIRTUAL ROOM / [facility]    Anesthesia Start: 1638 Anesthesia Stop: 1715    Procedure: ESOPHAGOGASTRODUODENOSCOPY FOREIGN BODY REMOVAL Diagnosis:       Swallowed foreign body, initial encounter      (Swallowed foreign body, initial encounter [T18.9XXA])    Surgeons: Harjit Blanco DO Responsible Provider: Gabriel Walls MD    Anesthesia Type: general ASA Status: 3 - Emergent            Anesthesia Type: No value filed.    Saad Phase I: Saad Score: 10    Saad Phase II: Saad Score: 10    Anesthesia Post Evaluation    Patient location during evaluation: PACU  Patient participation: complete - patient participated  Level of consciousness: awake and alert  Airway patency: patent  Nausea & Vomiting: no nausea and no vomiting  Cardiovascular status: blood pressure returned to baseline  Respiratory status: acceptable  Hydration status: euvolemic  Comments: VSS on transfer to phase 2 recovery.  No anesthetic complications.  Pain management: adequate    No notable events documented.  
routine and ritual male circumcision

## 2024-04-03 NOTE — PROGRESS NOTES
[facility] Pre-Admission Testing Electronic Communication Worksheet for OR/ENDO Procedures        Patient: Vlad Carrillo    DOS: 4/12    Transportation Confirmed: [x] YES    []  NO    History and Physical:  [] YES    []  NO  [x] N/A  If yes, please list doctor or Urgent Care and date of H&P:     Additional Clearance(Cardiac, Pulmonary, etc):  [] YES    [x]  NO    Pre-Admission Testing Visit:  [] YES    [x]  NO If no, do labs/testing need to be done DOS?  [] YES    [x]  NO    Medication Reconciliation Complete:  [x] YES    []  NO    Patient aware to hold ASA and Plavix, patient plans to call MD Infante for how long.       Additional Notes:      Interview Complete: [x] YES    []  NO          Carol Martinez RN  10:15 AM

## 2024-04-03 NOTE — PROGRESS NOTES
[facility] PRE-OPERATIVE INSTRUCTIONS    Day of Procedure:  4/12              Arrival time: 1330               Surgery time:1500    Take the following medications with a sip of water:  Follow your MD/Surgeons pre-procedure instructions regarding your medications     Do not eat or drink anything after 12:00 midnight prior to your surgery.  This includes water chewing gum, mints and ice chips.   You may brush your teeth and gargle the morning of your surgery, but do not swallow the water     Please see your family doctor/pediatrician for a history and physical and/or concerning medications.   Bring any test results/reports from your physicians office.   If you are under the care of a heart doctor or specialist doctor, please be aware that you may be asked to them for clearance    You may be asked to stop blood thinners such as Coumadin, Plavix, Fragmin, Lovenox, etc., or any anti-inflammatories such as:  Aspirin, Ibuprofen, Advil, Naproxen prior to your surgery.    We also ask that you stop any OTC medications such as fish oil, vitamin E, glucosamine, garlic, Multivitamins, COQ 10, etc.    We ask that you do not smoke 24 hours prior to surgery  We ask that you do not  drink any alcoholic beverages 24 hours prior to surgery     You must make arrangements for a responsible adult to take you home after your surgery.    For your safety you will not be allowed to leave alone or drive yourself home.  Your surgery will be cancelled if you do not have a ride home.     Also for your safety, it is strongly suggested that someone stay with you the first 24 hours after your surgery.     A parent or legal guardian must accompany a child scheduled for surgery and plan to stay at the hospital until the child is discharged.    Please do not bring other children with you.    For your comfort, please wear simple loose fitting clothing to the hospital.  Please do not bring valuables.    Do not wear any make-up or nail

## 2024-04-11 ENCOUNTER — ANESTHESIA EVENT (OUTPATIENT)
Dept: ENDOSCOPY | Age: 84
End: 2024-04-11
Payer: MEDICARE

## 2024-04-12 ENCOUNTER — ANESTHESIA (OUTPATIENT)
Dept: ENDOSCOPY | Age: 84
End: 2024-04-12
Payer: MEDICARE

## 2024-04-12 ENCOUNTER — HOSPITAL ENCOUNTER (OUTPATIENT)
Age: 84
Setting detail: OUTPATIENT SURGERY
Discharge: HOME | End: 2024-04-12
Attending: INTERNAL MEDICINE | Admitting: INTERNAL MEDICINE
Payer: MEDICARE

## 2024-04-12 VITALS
RESPIRATION RATE: 16 BRPM | SYSTOLIC BLOOD PRESSURE: 136 MMHG | WEIGHT: 155 LBS | DIASTOLIC BLOOD PRESSURE: 68 MMHG | HEART RATE: 68 BPM | OXYGEN SATURATION: 97 % | HEIGHT: 66 IN | TEMPERATURE: 96.8 F | BODY MASS INDEX: 24.91 KG/M2

## 2024-04-12 PROCEDURE — 7100000011 HC PHASE II RECOVERY - ADDTL 15 MIN: Performed by: INTERNAL MEDICINE

## 2024-04-12 PROCEDURE — 2580000003 HC RX 258: Performed by: ANESTHESIOLOGY

## 2024-04-12 PROCEDURE — 6360000002 HC RX W HCPCS: Performed by: NURSE ANESTHETIST, CERTIFIED REGISTERED

## 2024-04-12 PROCEDURE — 3700000000 HC ANESTHESIA ATTENDED CARE: Performed by: INTERNAL MEDICINE

## 2024-04-12 PROCEDURE — 3700000001 HC ADD 15 MINUTES (ANESTHESIA): Performed by: INTERNAL MEDICINE

## 2024-04-12 PROCEDURE — 7100000001 HC PACU RECOVERY - ADDTL 15 MIN: Performed by: INTERNAL MEDICINE

## 2024-04-12 PROCEDURE — 2500000003 HC RX 250 WO HCPCS: Performed by: NURSE ANESTHETIST, CERTIFIED REGISTERED

## 2024-04-12 PROCEDURE — 7100000000 HC PACU RECOVERY - FIRST 15 MIN: Performed by: INTERNAL MEDICINE

## 2024-04-12 PROCEDURE — 3609017100 HC EGD: Performed by: INTERNAL MEDICINE

## 2024-04-12 PROCEDURE — 7100000010 HC PHASE II RECOVERY - FIRST 15 MIN: Performed by: INTERNAL MEDICINE

## 2024-04-12 PROCEDURE — 2709999900 HC NON-CHARGEABLE SUPPLY: Performed by: INTERNAL MEDICINE

## 2024-04-12 RX ORDER — SODIUM CHLORIDE 0.9 % (FLUSH) 0.9 %
5-40 SYRINGE (ML) INJECTION EVERY 12 HOURS SCHEDULED
Status: DISCONTINUED | OUTPATIENT
Start: 2024-04-12 | End: 2024-04-12 | Stop reason: HOSPADM

## 2024-04-12 RX ORDER — SODIUM CHLORIDE 9 MG/ML
INJECTION, SOLUTION INTRAVENOUS PRN
Status: DISCONTINUED | OUTPATIENT
Start: 2024-04-12 | End: 2024-04-12 | Stop reason: HOSPADM

## 2024-04-12 RX ORDER — FENTANYL CITRATE 0.05 MG/ML
25 INJECTION, SOLUTION INTRAMUSCULAR; INTRAVENOUS EVERY 5 MIN PRN
Status: DISCONTINUED | OUTPATIENT
Start: 2024-04-12 | End: 2024-04-12 | Stop reason: HOSPADM

## 2024-04-12 RX ORDER — SODIUM CHLORIDE 0.9 % (FLUSH) 0.9 %
5-40 SYRINGE (ML) INJECTION PRN
Status: DISCONTINUED | OUTPATIENT
Start: 2024-04-12 | End: 2024-04-12 | Stop reason: HOSPADM

## 2024-04-12 RX ORDER — NALOXONE HYDROCHLORIDE 0.4 MG/ML
INJECTION, SOLUTION INTRAMUSCULAR; INTRAVENOUS; SUBCUTANEOUS PRN
Status: DISCONTINUED | OUTPATIENT
Start: 2024-04-12 | End: 2024-04-12 | Stop reason: HOSPADM

## 2024-04-12 RX ORDER — PROPOFOL 10 MG/ML
INJECTION, EMULSION INTRAVENOUS PRN
Status: DISCONTINUED | OUTPATIENT
Start: 2024-04-12 | End: 2024-04-12 | Stop reason: SDUPTHER

## 2024-04-12 RX ORDER — LABETALOL HYDROCHLORIDE 5 MG/ML
5 INJECTION, SOLUTION INTRAVENOUS
Status: DISCONTINUED | OUTPATIENT
Start: 2024-04-12 | End: 2024-04-12 | Stop reason: HOSPADM

## 2024-04-12 RX ORDER — LIDOCAINE HYDROCHLORIDE 20 MG/ML
INJECTION, SOLUTION EPIDURAL; INFILTRATION; INTRACAUDAL; PERINEURAL PRN
Status: DISCONTINUED | OUTPATIENT
Start: 2024-04-12 | End: 2024-04-12 | Stop reason: SDUPTHER

## 2024-04-12 RX ORDER — DIPHENHYDRAMINE HYDROCHLORIDE 50 MG/ML
12.5 INJECTION INTRAMUSCULAR; INTRAVENOUS
Status: DISCONTINUED | OUTPATIENT
Start: 2024-04-12 | End: 2024-04-12 | Stop reason: HOSPADM

## 2024-04-12 RX ORDER — ONDANSETRON 2 MG/ML
4 INJECTION INTRAMUSCULAR; INTRAVENOUS
Status: DISCONTINUED | OUTPATIENT
Start: 2024-04-12 | End: 2024-04-12 | Stop reason: HOSPADM

## 2024-04-12 RX ADMIN — PROPOFOL 120 MCG/KG/MIN: 10 INJECTION, EMULSION INTRAVENOUS at 14:52

## 2024-04-12 RX ADMIN — LIDOCAINE HYDROCHLORIDE 50 MG: 20 INJECTION, SOLUTION EPIDURAL; INFILTRATION; INTRACAUDAL; PERINEURAL at 14:50

## 2024-04-12 RX ADMIN — SODIUM CHLORIDE: 9 INJECTION, SOLUTION INTRAVENOUS at 14:22

## 2024-04-12 RX ADMIN — PROPOFOL 100 MG: 10 INJECTION, EMULSION INTRAVENOUS at 14:51

## 2024-04-12 ASSESSMENT — PAIN DESCRIPTION - DESCRIPTORS
DESCRIPTORS: DISCOMFORT
DESCRIPTORS: DISCOMFORT

## 2024-04-12 ASSESSMENT — PAIN DESCRIPTION - PAIN TYPE
TYPE: ACUTE PAIN
TYPE: ACUTE PAIN

## 2024-04-12 ASSESSMENT — PAIN DESCRIPTION - LOCATION
LOCATION: RIB CAGE
LOCATION: RIB CAGE

## 2024-04-12 ASSESSMENT — PAIN - FUNCTIONAL ASSESSMENT
PAIN_FUNCTIONAL_ASSESSMENT: ADULT NONVERBAL PAIN SCALE (NPVS)
PAIN_FUNCTIONAL_ASSESSMENT: 0-10

## 2024-04-12 ASSESSMENT — PAIN DESCRIPTION - ONSET
ONSET: ON-GOING
ONSET: SUDDEN

## 2024-04-12 ASSESSMENT — PAIN SCALES - GENERAL
PAINLEVEL_OUTOF10: 6
PAINLEVEL_OUTOF10: 4

## 2024-04-12 ASSESSMENT — ENCOUNTER SYMPTOMS: SHORTNESS OF BREATH: 0

## 2024-04-12 ASSESSMENT — LIFESTYLE VARIABLES: SMOKING_STATUS: 0

## 2024-04-12 ASSESSMENT — PAIN DESCRIPTION - FREQUENCY
FREQUENCY: INTERMITTENT
FREQUENCY: INTERMITTENT

## 2024-04-12 NOTE — ANESTHESIA PRE PROCEDURE
Department of Anesthesiology  Preprocedure Note       Name:  Vlad Carrillo   Age:  83 y.o.  :  1940                                          MRN:  [id number]         Date:  2024      Surgeon: Surgeon(s):  Alex Miranda MD    Procedure: Procedure(s):  ESOPHAGOGASTRODUODENOSCOPY WITH DILATION    Medications prior to admission:   Prior to Admission medications    Medication Sig Start Date End Date Taking? Authorizing Provider   clopidogrel (PLAVIX) 75 MG tablet Take 1 tablet by mouth daily    Satish Warner MD   Milk Thistle 500 MG CAPS Take 1 capsule by mouth daily    Satish Warner MD   DULoxetine (CYMBALTA) 20 MG extended release capsule Take 1 capsule by mouth daily 22   Satish Warner MD   famotidine (PEPCID) 20 MG tablet Take 1 tablet by mouth 2 times daily 22   Satish Warner MD   furosemide (LASIX) 20 MG tablet Take 1 tablet by mouth daily 22   Satish Warner MD   aspirin 81 MG tablet Take 1 tablet by mouth daily    Satish Warner MD   atenolol (TENORMIN) 50 MG tablet Take 1 tablet by mouth daily 8/10/17   Satish Warner MD   nitroGLYCERIN (NITROSTAT) 0.4 MG SL tablet Place 1 tablet under the tongue every 5 minutes as needed 08   Satish Warner MD   pantoprazole (PROTONIX) 40 MG tablet Take 1 tablet by mouth at bedtime 8/15/17   Satish Warner MD   rOPINIRole (REQUIP) 2 MG tablet Take 0.5 tablets by mouth 4 times daily 1/10/17   Satish Warner MD   lisinopril (PRINIVIL;ZESTRIL) 10 MG tablet Take 1 tablet by mouth daily    Satish Warner MD   potassium chloride (KLOR-CON M) 20 MEQ TBCR extended release tablet Take 1 tablet by mouth daily (with breakfast)    Satish Warner MD       Current medications:    Current Facility-Administered Medications   Medication Dose Route Frequency Provider Last Rate Last Admin   • sodium chloride flush 0.9 % injection 5-40 mL  5-40 mL IntraVENous 2 times

## 2024-04-12 NOTE — PROGRESS NOTES
Pt asleep on arrival to PACU. Bite block removed. Pt occasionally snores. BP 84/45. IV fluids infusing. O2 sat 98% on 2L. Laying on left side.

## 2024-04-12 NOTE — H&P
[facility]   Attending Pre-operative History and Physical      PROCEDURE:  EGD    Indication:The patient is a 83 y.o. female presents for an upper endoscopy.    Past Medical History:    Past Medical History:   Diagnosis Date    A-fib (HCC)     Acid reflux     Arthritis     Atrial fibrillation (HCC)     CAD (coronary artery disease)     Diarrhea     Dysphagia 03/2023    \"my throat has problems closing up.\"    Fatty liver     Hyperlipidemia     Hypertension     NSTEMI (non-ST elevated myocardial infarction) (HCC)     stent placed 2/19/24    Prolonged emergence from general anesthesia     FAMILY HX-BROTHER-AFTER HEART SURGERY    Restless leg     Stomach ulcer     TIA (transient ischemic attack) 1995    NO RESIDUAL      Past Surgical History:    Past Surgical History:   Procedure Laterality Date    CAROTID ENDARTERECTOMY Right 1995    CAROTID ENDARTERECTOMY Left     CHOLECYSTECTOMY      COLONOSCOPY      CORONARY ANGIOPLASTY WITH STENT PLACEMENT  2014    X4    CORONARY ANGIOPLASTY WITH STENT PLACEMENT  02/19/2024    x2    DILATION AND CURETTAGE OF UTERUS N/A 04/02/2019    HYSTEROSCOPY DILATION AND CURETTAGE POLYP REMOVAL performed by Zahira Wilkes MD at [facility] OR    EYE SURGERY      LIPOMA RESECTION      LIPOMA REMOVAL SHOULDER    UPPER GASTROINTESTINAL ENDOSCOPY N/A 02/10/2023    ESOPHAGOGASTRODUODENOSCOPY WITH BALLOON DILATION OF ESOPHAGUS performed by Alex Miranda MD at [facility] ENDOSCOPY    UPPER GASTROINTESTINAL ENDOSCOPY N/A 03/17/2023    EGD DILATION BALLOON performed by Alex Miranda MD at [facility] ENDOSCOPY    UPPER GASTROINTESTINAL ENDOSCOPY N/A 09/08/2023    ESOPHAGOGASTRODUODENOSCOPY WITH DILATION performed by Alex Miranda MD at [facility] ENDOSCOPY    UPPER GASTROINTESTINAL ENDOSCOPY N/A 03/29/2024    ESOPHAGOGASTRODUODENOSCOPY FOREIGN BODY REMOVAL performed by Harjit Blanco DO at [facility] ENDOSCOPY      Medications Prior to Admission:   Prior to Admission medications    Medication Sig  Start Date End Date Taking? Authorizing Provider   clopidogrel (PLAVIX) 75 MG tablet Take 1 tablet by mouth daily    Satish Warner MD   Milk Thistle 500 MG CAPS Take 1 capsule by mouth daily    Satish Warner MD   DULoxetine (CYMBALTA) 20 MG extended release capsule Take 1 capsule by mouth daily 12/1/22   Satish Warner MD   famotidine (PEPCID) 20 MG tablet Take 1 tablet by mouth 2 times daily 9/24/22   Satish Warner MD   furosemide (LASIX) 20 MG tablet Take 1 tablet by mouth daily 8/31/22   Satish Warner MD   aspirin 81 MG tablet Take 1 tablet by mouth daily    Satish Warner MD   atenolol (TENORMIN) 50 MG tablet Take 1 tablet by mouth daily 8/10/17   Satish Warner MD   nitroGLYCERIN (NITROSTAT) 0.4 MG SL tablet Place 1 tablet under the tongue every 5 minutes as needed 11/12/08   Satish Warner MD   pantoprazole (PROTONIX) 40 MG tablet Take 1 tablet by mouth at bedtime 8/15/17   Satish Warner MD   rOPINIRole (REQUIP) 2 MG tablet Take 0.5 tablets by mouth 4 times daily 1/10/17   Satish Warner MD   lisinopril (PRINIVIL;ZESTRIL) 10 MG tablet Take 1 tablet by mouth daily    Satish Warner MD   potassium chloride (KLOR-CON M) 20 MEQ TBCR extended release tablet Take 1 tablet by mouth daily (with breakfast)    Satish Warner MD        Allergies:  Gabapentin and Penicillins  History of allergic reaction to anesthesia:  No    Social History:   Social History     Socioeconomic History    Marital status:      Spouse name: Not on file    Number of children: Not on file    Years of education: Not on file    Highest education level: Not on file   Occupational History    Not on file   Tobacco Use    Smoking status: Never    Smokeless tobacco: Never   Vaping Use    Vaping Use: Never used   Substance and Sexual Activity    Alcohol use: Yes     Comment: social    Drug use: Never    Sexual activity: Not Currently   Other Topics Concern

## 2024-04-12 NOTE — ANESTHESIA POSTPROCEDURE EVALUATION
Department of Anesthesiology  Postprocedure Note    Patient: Vlad Carrillo  MRN: [id number]  YOB: 1940  Date of evaluation: 4/12/2024    Procedure Summary       Date: 04/12/24 Room / Location: [address]    Anesthesia Start: 1440 Anesthesia Stop: 1458    Procedure: ESOPHAGOGASTRODUODENOSCOPY Diagnosis:       Esophageal obstruction      (Esophageal obstruction [K22.2])    Surgeons: Alex Miranda MD Responsible Provider: Elvie Amin MD    Anesthesia Type: MAC ASA Status: 4            Anesthesia Type: MAC    Saad Phase I: Saad Score: 10    Saad Phase II: Saad Score: 10    Anesthesia Post Evaluation    Patient location during evaluation: bedside  Patient participation: complete - patient participated  Level of consciousness: awake and alert  Pain score: 0  Airway patency: patent  Nausea & Vomiting: no vomiting  Cardiovascular status: blood pressure returned to baseline  Respiratory status: acceptable  Hydration status: euvolemic  Pain management: adequate    No notable events documented.

## 2024-04-12 NOTE — DISCHARGE INSTRUCTIONS
Endoscopy Discharge Instructions    [facility] with any questions or concerns.    You may be drowsy or lightheaded after receiving sedation.  DO NOT operate  a vehicle (automobile, bicycle, motorcycle, machinery, or power tools), no  alcoholic beverages, and do not make any important decisions today.                 Plan on bed rest or quiet relaxation today.  Resume normal activities in the morning.     Resume normal activity tomorrow unless otherwise advised by your physician.            Eat a light first meal, avoiding spicy and fatty foods, then resume normal diet unless  you are told otherwise by your physician.    If the intravenous medication site is painful, apply warm compresses on the site until the soreness is relieved and elevate the arm above the heart. Call your physician if no improvement  in 2-3 days.       POSSIBLE SYMPTOMS TO WATCH:     1. fever (greater than 100) 5. increased abdominal bloating   2. severe pain   6. excessive bleeding   3. nausea and vomiting  7. chest pain   4. chills    8. shortness of breath       [facility] if these problems occur     Expected as normal and remedies:  Sore throat: use over the counter throat lozenges or gargle with warm salt water.  Redness or soreness at the IV site: apply warm compress  Gaseous discomfort: belching or passing flatus (gas).    Continue same  meds.    Alex Miranda MD    With questions or concerns call Dr Miranad at .

## 2024-04-12 NOTE — PROGRESS NOTES
Pt opened eyes to voice at 1510 and asks about procedure. Denies pain. BP 89/48, asymptomatic. Covered with warm blanket. Explained PACU course.

## 2024-04-12 NOTE — OP NOTE
Esophagogastroduodenoscopy Note    Patient:   Vlad Carrillo    :    1940    Facility:   [facility] [Outpatient]   Referring/PCP: Eliud Leach MD    Procedure:   Esophagogastroduodenoscopy   Date:     2024   Endoscopist:  Alex Miranda MD     Preoperative Diagnosis:    dysphagia    Postoperative Diagnosis:  1.  Stricture at the GE junction, easily allows the passage of the  upper endoscope.  Stricture was not dilated.  Patient is on Plavix and aspirin.  2.  Previously noted esophageal ulceration has resolved.  3.  Mild gastritis.  4.  Medium size hiatal hernia.    Anesthesia: MAC    Estimated blood loss: None    Complications: None    Specimen: None    Instrument:     Description of Procedure:  Informed consent was obtained from the patient after explanation of the procedure including indications, description of the procedure,  benefits and possible risks and complications of the procedure, and alternatives. Questions were answered.  The patient's history was reviewed and a directed physical examination was performed prior to the procedure.    Patient was monitored throughout the procedure with pulse oximetry and periodic assessment of vital signs. Patient was sedated as noted above. With the patient in the left lateral decubitus position, the Olympus videoendoscope was placed in the patient's mouth and under direct visualization passed into the esophagus.  Visualization of the esophagus, stomach, and duodenum was performed during both introduction and withdrawal of the endoscope and retroflexed view of the proximal stomach was obtained. The scope was passed to the 2nd portion of the duodenum.  The patient tolerated the procedure well and was taken to the recovery area in good condition.    Findings: The mucosa in the esophagus is normal.  The distal esophagus is somewhat tortuous.  The previously noted ulceration has resolved.  A

## 2024-04-12 NOTE — PROGRESS NOTES
Pt complain of dryness. Lips dry and chapped. Given sips of water. Chapstick applied to lips. Pt resting.

## 2024-04-13 ENCOUNTER — OFFICE VISIT (OUTPATIENT)
Age: 84
End: 2024-04-13

## 2024-04-13 VITALS
SYSTOLIC BLOOD PRESSURE: 133 MMHG | RESPIRATION RATE: 18 BRPM | TEMPERATURE: 97.5 F | HEART RATE: 63 BPM | BODY MASS INDEX: 24.33 KG/M2 | OXYGEN SATURATION: 98 % | WEIGHT: 155 LBS | DIASTOLIC BLOOD PRESSURE: 75 MMHG | HEIGHT: 67 IN

## 2024-04-13 DIAGNOSIS — R41.0: ICD-10-CM

## 2024-04-13 DIAGNOSIS — N30.01 ACUTE CYSTITIS WITH HEMATURIA: ICD-10-CM

## 2024-04-13 DIAGNOSIS — R39.9 LOWER URINARY TRACT SYMPTOMS (LUTS): ICD-10-CM

## 2024-04-13 DIAGNOSIS — R19.7 ACUTE DIARRHEA: Primary | ICD-10-CM

## 2024-04-13 PROBLEM — R73.03 PREDIABETES: Status: ACTIVE | Noted: 2017-11-09

## 2024-04-13 PROBLEM — R10.13 EPIGASTRIC PAIN: Status: ACTIVE | Noted: 2018-04-09

## 2024-04-13 PROBLEM — M54.16 LUMBAR RADICULOPATHY: Status: ACTIVE | Noted: 2023-09-26

## 2024-04-13 PROBLEM — R13.19 ESOPHAGEAL DYSPHAGIA: Status: ACTIVE | Noted: 2019-05-29

## 2024-04-13 PROBLEM — M81.0 AGE-RELATED OSTEOPOROSIS WITHOUT CURRENT PATHOLOGICAL FRACTURE: Status: ACTIVE | Noted: 2021-11-11

## 2024-04-13 PROBLEM — M17.0 PRIMARY OSTEOARTHRITIS OF BOTH KNEES: Status: ACTIVE | Noted: 2017-08-17

## 2024-04-13 PROBLEM — M19.011 OSTEOARTHRITIS OF RIGHT AC (ACROMIOCLAVICULAR) JOINT: Status: ACTIVE | Noted: 2021-06-29

## 2024-04-13 PROBLEM — R68.89 CONGESTION OF THROAT: Status: ACTIVE | Noted: 2021-11-14

## 2024-04-13 PROBLEM — R43.0 ANOSMIA: Status: ACTIVE | Noted: 2017-11-09

## 2024-04-13 PROBLEM — G89.29 CHRONIC RIGHT-SIDED LOW BACK PAIN: Status: ACTIVE | Noted: 2021-08-09

## 2024-04-13 PROBLEM — I65.23 CAROTID STENOSIS, BILATERAL: Status: ACTIVE | Noted: 2020-02-28

## 2024-04-13 PROBLEM — M79.672 FOOT PAIN, BILATERAL: Status: ACTIVE | Noted: 2018-05-05

## 2024-04-13 PROBLEM — M79.671 FOOT PAIN, BILATERAL: Status: ACTIVE | Noted: 2018-05-05

## 2024-04-13 PROBLEM — G25.81 RESTLESS LEGS SYNDROME (RLS): Status: ACTIVE | Noted: 2017-08-17

## 2024-04-13 PROBLEM — R60.0 LOCALIZED EDEMA: Status: ACTIVE | Noted: 2018-12-17

## 2024-04-13 PROBLEM — M54.50 CHRONIC RIGHT-SIDED LOW BACK PAIN: Status: ACTIVE | Noted: 2021-08-09

## 2024-04-13 PROBLEM — M17.11 PRIMARY OSTEOARTHRITIS OF RIGHT KNEE: Status: ACTIVE | Noted: 2018-10-03

## 2024-04-13 PROBLEM — R29.6 REPEATED FALLS: Status: ACTIVE | Noted: 2023-10-25

## 2024-04-13 PROBLEM — F32.9 MAJOR DEPRESSIVE DISORDER, SINGLE EPISODE, UNSPECIFIED: Status: ACTIVE | Noted: 2023-09-24

## 2024-04-13 PROBLEM — M47.812 SPONDYLOSIS WITHOUT MYELOPATHY OR RADICULOPATHY, CERVICAL REGION: Status: ACTIVE | Noted: 2023-09-24

## 2024-04-13 PROBLEM — M25.511 ACUTE PAIN OF RIGHT SHOULDER: Status: ACTIVE | Noted: 2021-06-07

## 2024-04-13 PROBLEM — S46.011A TRAUMATIC COMPLETE TEAR OF RIGHT ROTATOR CUFF: Status: ACTIVE | Noted: 2021-06-29

## 2024-04-13 LAB
BILIRUBIN, POC: ABNORMAL
BLOOD URINE, POC: ABNORMAL
CLARITY, POC: CLEAR
COLOR, POC: ABNORMAL
GLUCOSE URINE, POC: NEGATIVE
KETONES, POC: NEGATIVE
LEUKOCYTE EST, POC: ABNORMAL
NITRITE, POC: NEGATIVE
PH, POC: 5.5
PROTEIN, POC: 30
SPECIFIC GRAVITY, POC: 1.03
UROBILINOGEN, POC: 0.2

## 2024-04-13 RX ORDER — CIPROFLOXACIN 500 MG/1
500 TABLET, FILM COATED ORAL 2 TIMES DAILY
Qty: 14 TABLET | Refills: 0 | Status: SHIPPED | OUTPATIENT
Start: 2024-04-13 | End: 2024-04-20

## 2024-04-16 LAB
BACTERIA UR CULT: ABNORMAL
ORGANISM: ABNORMAL

## 2024-11-17 ENCOUNTER — HOSPITAL ENCOUNTER (EMERGENCY)
Age: 84
Discharge: HOME | End: 2024-11-17
Attending: EMERGENCY MEDICINE
Payer: MEDICARE

## 2024-11-17 ENCOUNTER — APPOINTMENT (OUTPATIENT)
Dept: GENERAL RADIOLOGY | Age: 84
End: 2024-11-17
Payer: MEDICARE

## 2024-11-17 VITALS
HEART RATE: 68 BPM | WEIGHT: 156.53 LBS | OXYGEN SATURATION: 98 % | SYSTOLIC BLOOD PRESSURE: 132 MMHG | BODY MASS INDEX: 24.57 KG/M2 | TEMPERATURE: 98.1 F | RESPIRATION RATE: 18 BRPM | HEIGHT: 67 IN | DIASTOLIC BLOOD PRESSURE: 57 MMHG

## 2024-11-17 DIAGNOSIS — R13.10 DYSPHAGIA, UNSPECIFIED TYPE: Primary | ICD-10-CM

## 2024-11-17 LAB
ANION GAP SERPL CALCULATED.3IONS-SCNC: 11 MMOL/L (ref 3–16)
BASOPHILS # BLD: 0 K/UL (ref 0–0.2)
BASOPHILS NFR BLD: 0.7 %
BUN SERPL-MCNC: 19 MG/DL (ref 7–20)
CALCIUM SERPL-MCNC: 9.7 MG/DL (ref 8.3–10.6)
CHLORIDE SERPL-SCNC: 102 MMOL/L (ref 99–110)
CO2 SERPL-SCNC: 30 MMOL/L (ref 21–32)
CREAT SERPL-MCNC: 0.7 MG/DL (ref 0.6–1.2)
DEPRECATED RDW RBC AUTO: 15.3 % (ref 12.4–15.4)
EOSINOPHIL # BLD: 0.1 K/UL (ref 0–0.6)
EOSINOPHIL NFR BLD: 1.8 %
GFR SERPLBLD CREATININE-BSD FMLA CKD-EPI: 86 ML/MIN/{1.73_M2}
GLUCOSE SERPL-MCNC: 78 MG/DL (ref 70–99)
HCT VFR BLD AUTO: 37.1 % (ref 36–48)
HGB BLD-MCNC: 12.4 G/DL (ref 12–16)
LYMPHOCYTES # BLD: 2.6 K/UL (ref 1–5.1)
LYMPHOCYTES NFR BLD: 39.2 %
MAGNESIUM SERPL-MCNC: 2 MG/DL (ref 1.8–2.4)
MCH RBC QN AUTO: 30.6 PG (ref 26–34)
MCHC RBC AUTO-ENTMCNC: 33.5 G/DL (ref 31–36)
MCV RBC AUTO: 91.2 FL (ref 80–100)
MONOCYTES # BLD: 0.8 K/UL (ref 0–1.3)
MONOCYTES NFR BLD: 11.6 %
NEUTROPHILS # BLD: 3 K/UL (ref 1.7–7.7)
NEUTROPHILS NFR BLD: 46.7 %
PLATELET # BLD AUTO: 182 K/UL (ref 135–450)
PMV BLD AUTO: 7.9 FL (ref 5–10.5)
POTASSIUM SERPL-SCNC: 3.5 MMOL/L (ref 3.5–5.1)
RBC # BLD AUTO: 4.07 M/UL (ref 4–5.2)
SODIUM SERPL-SCNC: 143 MMOL/L (ref 136–145)
WBC # BLD AUTO: 6.5 K/UL (ref 4–11)

## 2024-11-17 PROCEDURE — 99284 EMERGENCY DEPT VISIT MOD MDM: CPT

## 2024-11-17 PROCEDURE — 85025 COMPLETE CBC W/AUTO DIFF WBC: CPT

## 2024-11-17 PROCEDURE — 74022 RADEX COMPL AQT ABD SERIES: CPT

## 2024-11-17 PROCEDURE — 80048 BASIC METABOLIC PNL TOTAL CA: CPT

## 2024-11-17 PROCEDURE — 83735 ASSAY OF MAGNESIUM: CPT

## 2024-11-17 PROCEDURE — 36415 COLL VENOUS BLD VENIPUNCTURE: CPT

## 2024-11-17 PROCEDURE — 6370000000 HC RX 637 (ALT 250 FOR IP): Performed by: EMERGENCY MEDICINE

## 2024-11-17 RX ORDER — ROPINIROLE 1 MG/1
2 TABLET, FILM COATED ORAL ONCE
Status: COMPLETED | OUTPATIENT
Start: 2024-11-17 | End: 2024-11-17

## 2024-11-17 RX ADMIN — ROPINIROLE HYDROCHLORIDE 2 MG: 1 TABLET, FILM COATED ORAL at 09:50

## 2024-11-17 ASSESSMENT — PAIN - FUNCTIONAL ASSESSMENT: PAIN_FUNCTIONAL_ASSESSMENT: NONE - DENIES PAIN

## 2024-11-17 NOTE — ED PROVIDER NOTES
Reviewed   CBC WITH AUTO DIFFERENTIAL   BASIC METABOLIC PANEL W/ REFLEX TO MG FOR LOW K   MAGNESIUM      When ordered only abnormal lab results are displayed. All other labs were within normal range or not returned as of this dictation.     EKG:      RADIOLOGY:    Non-plain film images such as CT, Ultrasound and MRI are read by the radiologist. Plain radiographic images are visualized and preliminarily interpreted by the ED Provider with the below findings:      Interpretation per the Radiologist below, if available at the time of this note:    XR ACUTE ABD SERIES CHEST 1 VW   Final Result   1. Large hiatal hernia.   2. Nonspecific bowel gas pattern.   3. Nonspecific bowel gas pattern.  Consider CT scan to further evaluate.                  EMERGENCY DEPARTMENT COURSE and DIFFERENTIAL DIAGNOSIS/MDM:     Vitals:    Vitals:    11/17/24 0726 11/17/24 0727   BP: (!) 155/79    Pulse: 72    Resp: 16    Temp: 98.1 °F (36.7 °C)    TempSrc: Oral    SpO2: 94%    Weight: 71.8 kg (158 lb 4.6 oz) 71 kg (156 lb 8.4 oz)   Height: 1.702 m (5' 7\") 1.702 m (5' 7\")        Patient was given the following medications:   Medications   rOPINIRole (REQUIP) tablet 2 mg (has no administration in time range)             CC/HPI Summary, DDx, ED Course, and Reassessment: The above history and physical exam were performed.  I reviewed her past medical past surgical social family.  Review of systems performed is negative as otherwise noted.  IV was established for baseline labs.  I gave her a cup of water to attempt to drink and she actually tolerated this quite well.    I discussed the case with the GI physician on-call and at this point in time they believe she can be discharged and can safely follow-up on an outpatient basis with appropriate return precautions.    CONSULTS: GI  Social Determinants:   Chronic Conditions: Esophageal Stricture   Disposition Considerations: None    Critical Care: I personally saw the patient and independently

## 2024-11-17 NOTE — DISCHARGE INSTRUCTIONS
1.  Follow-up your primary care physician in the next few days and follow-up with your GI physician call for an appointment tomorrow.  2.  Light diet for the next few days and no significant foods that involve chewing such as meat or chicken fried steak.  Fluids are more important than solids.  3.  Return emergency apartment for further episodes with inability to tolerate oral fluids

## 2024-11-20 ENCOUNTER — HOSPITAL ENCOUNTER (OUTPATIENT)
Age: 84
Setting detail: OUTPATIENT SURGERY
Discharge: HOME | End: 2024-11-20
Attending: INTERNAL MEDICINE | Admitting: INTERNAL MEDICINE
Payer: MEDICARE

## 2024-11-20 ENCOUNTER — ANESTHESIA (OUTPATIENT)
Dept: ENDOSCOPY | Age: 84
End: 2024-11-20
Payer: MEDICARE

## 2024-11-20 ENCOUNTER — APPOINTMENT (OUTPATIENT)
Dept: ENDOSCOPY | Age: 84
End: 2024-11-20
Attending: INTERNAL MEDICINE
Payer: MEDICARE

## 2024-11-20 ENCOUNTER — ANESTHESIA EVENT (OUTPATIENT)
Dept: ENDOSCOPY | Age: 84
End: 2024-11-20
Payer: MEDICARE

## 2024-11-20 VITALS
BODY MASS INDEX: 23.7 KG/M2 | TEMPERATURE: 97.4 F | RESPIRATION RATE: 14 BRPM | HEART RATE: 63 BPM | SYSTOLIC BLOOD PRESSURE: 132 MMHG | HEIGHT: 67 IN | WEIGHT: 151 LBS | DIASTOLIC BLOOD PRESSURE: 59 MMHG | OXYGEN SATURATION: 99 %

## 2024-11-20 DIAGNOSIS — R13.10 DYSPHAGIA, UNSPECIFIED TYPE: ICD-10-CM

## 2024-11-20 PROCEDURE — C1726 CATH, BAL DIL, NON-VASCULAR: HCPCS | Performed by: INTERNAL MEDICINE

## 2024-11-20 PROCEDURE — 7100000010 HC PHASE II RECOVERY - FIRST 15 MIN: Performed by: INTERNAL MEDICINE

## 2024-11-20 PROCEDURE — 3700000001 HC ADD 15 MINUTES (ANESTHESIA): Performed by: INTERNAL MEDICINE

## 2024-11-20 PROCEDURE — 3609017700 HC EGD DILATION GASTRIC/DUODENAL STRICTURE: Performed by: INTERNAL MEDICINE

## 2024-11-20 PROCEDURE — 6360000002 HC RX W HCPCS: Performed by: NURSE ANESTHETIST, CERTIFIED REGISTERED

## 2024-11-20 PROCEDURE — 7100000011 HC PHASE II RECOVERY - ADDTL 15 MIN: Performed by: INTERNAL MEDICINE

## 2024-11-20 PROCEDURE — 3609012400 HC EGD TRANSORAL BIOPSY SINGLE/MULTIPLE: Performed by: INTERNAL MEDICINE

## 2024-11-20 PROCEDURE — 2709999900 HC NON-CHARGEABLE SUPPLY: Performed by: INTERNAL MEDICINE

## 2024-11-20 PROCEDURE — 7100000001 HC PACU RECOVERY - ADDTL 15 MIN: Performed by: INTERNAL MEDICINE

## 2024-11-20 PROCEDURE — 3700000000 HC ANESTHESIA ATTENDED CARE: Performed by: INTERNAL MEDICINE

## 2024-11-20 PROCEDURE — 88305 TISSUE EXAM BY PATHOLOGIST: CPT

## 2024-11-20 PROCEDURE — 7100000000 HC PACU RECOVERY - FIRST 15 MIN: Performed by: INTERNAL MEDICINE

## 2024-11-20 RX ORDER — NALOXONE HYDROCHLORIDE 0.4 MG/ML
INJECTION, SOLUTION INTRAMUSCULAR; INTRAVENOUS; SUBCUTANEOUS PRN
Status: DISCONTINUED | OUTPATIENT
Start: 2024-11-20 | End: 2024-11-20 | Stop reason: HOSPADM

## 2024-11-20 RX ORDER — LIDOCAINE HYDROCHLORIDE 20 MG/ML
INJECTION, SOLUTION EPIDURAL; INFILTRATION; INTRACAUDAL; PERINEURAL
Status: DISCONTINUED | OUTPATIENT
Start: 2024-11-20 | End: 2024-11-20 | Stop reason: SDUPTHER

## 2024-11-20 RX ORDER — ONDANSETRON 2 MG/ML
4 INJECTION INTRAMUSCULAR; INTRAVENOUS
Status: DISCONTINUED | OUTPATIENT
Start: 2024-11-20 | End: 2024-11-20 | Stop reason: HOSPADM

## 2024-11-20 RX ORDER — PROPOFOL 10 MG/ML
INJECTION, EMULSION INTRAVENOUS
Status: DISCONTINUED | OUTPATIENT
Start: 2024-11-20 | End: 2024-11-20 | Stop reason: SDUPTHER

## 2024-11-20 RX ADMIN — PROPOFOL 50 MG: 10 INJECTION, EMULSION INTRAVENOUS at 16:20

## 2024-11-20 RX ADMIN — PROPOFOL 25 MG: 10 INJECTION, EMULSION INTRAVENOUS at 16:14

## 2024-11-20 RX ADMIN — LIDOCAINE HYDROCHLORIDE 8 MG: 20 INJECTION, SOLUTION EPIDURAL; INFILTRATION; INTRACAUDAL; PERINEURAL at 16:10

## 2024-11-20 RX ADMIN — PROPOFOL 50 MG: 10 INJECTION, EMULSION INTRAVENOUS at 16:11

## 2024-11-20 RX ADMIN — PROPOFOL 75 MCG/KG/MIN: 10 INJECTION, EMULSION INTRAVENOUS at 16:12

## 2024-11-20 ASSESSMENT — PAIN SCALES - GENERAL
PAINLEVEL_OUTOF10: 0

## 2024-11-20 ASSESSMENT — PAIN - FUNCTIONAL ASSESSMENT
PAIN_FUNCTIONAL_ASSESSMENT: ADULT NONVERBAL PAIN SCALE (NPVS)
PAIN_FUNCTIONAL_ASSESSMENT: 0-10

## 2024-11-20 ASSESSMENT — LIFESTYLE VARIABLES: SMOKING_STATUS: 0

## 2024-11-20 ASSESSMENT — ENCOUNTER SYMPTOMS: SHORTNESS OF BREATH: 0

## 2024-11-20 NOTE — OP NOTE
Esophagogastroduodenoscopy Note    Patient:   Vlad Carrillo    :    1940    Facility:   [facility] [Outpatient]   Referring/PCP: Eliud Leach MD    Procedure:   Esophagogastroduodenoscopy   Date:     2024   Endoscopist:  Alex Miranda MD     Preoperative Diagnosis:    Dysphagia    Postoperative Diagnosis:  1.  Stricture at the gastroesophageal junction dilated with a through-the-scope balloon to 10 mm, 11 mm, 12 mm, 13.5 mm  2.  Shallow circumferential tear at the site of the stricture post dilation.  3.  Hiatal hernia  4.  Gastritis, biopsies obtained and sent for pathology  5.  Normal duodenum    Anesthesia:  MAC    Estimated blood loss: Minimal    Complications: None    Specimen: Biopsy of gastric mucosa    Instrument:     Description of Procedure:  Informed consent was obtained from the patient after explanation of the procedure including indications, description of the procedure,  benefits and possible risks and complications of the procedure, and alternatives. Questions were answered.  The patient's history was reviewed and a directed physical examination was performed prior to the procedure.    Patient was monitored throughout the procedure with pulse oximetry and periodic assessment of vital signs. Patient was sedated as noted above. With the patient in the left lateral decubitus position, the Olympus videoendoscope was placed in the patient's mouth and under direct visualization passed into the esophagus.  Visualization of the esophagus, stomach, and duodenum was performed during both introduction and withdrawal of the endoscope and retroflexed view of the proximal stomach was obtained. The scope was passed to the 2nd portion of the duodenum.  The patient tolerated the procedure well and was taken to the recovery area in good condition.    Findings: The mucosa in esophagus is normal.  A stricture was present at the gastroesophageal  junction.  We were able to pass the  upper endoscope through the stricture with minimal difficulty.  A medium size hiatal hernia is present.  Changes of gastritis were noted in the antrum and body of the stomach.  Biopsies were obtained to rule out H. pylori infection.  The mucosa in duodenal bulb and descending duodenum is normal.  Next, a through-the-scope balloon was passed and the stricture at the gastroesophageal junction was sequentially dilated to 10 mm, 11 mm, 12 mm, and then 13.5 mm.  After the 13.5 mm dilation, a shallow circumferential tear was noted with self-limited oozing of blood that resolved spontaneously.  No immediate complications were noted.      Recommendations:  1.  Check biopsy results  2.  Continue PPI  3.  Soft diet  4.  Consider repeat upper endoscopy and dilation in 2 to 3 weeks.    Alex Miranda MD, MD

## 2024-11-20 NOTE — ANESTHESIA PRE PROCEDURE
Department of Anesthesiology  Preprocedure Note       Name:  Vlad Carrillo   Age:  84 y.o.  :  1940                                          MRN:  [id number]         Date:  2024      Surgeon: Surgeon(s):  Alex Miranda MD    Procedure: Procedure(s):  ESOPHAGOGASTRODUODENOSCOPY    Medications prior to admission:   Prior to Admission medications    Medication Sig Start Date End Date Taking? Authorizing Provider   clopidogrel (PLAVIX) 75 MG tablet Take 1 tablet by mouth daily    Satish Warner MD   Milk Thistle 500 MG CAPS Take 1 capsule by mouth daily    Satish Warner MD   DULoxetine (CYMBALTA) 20 MG extended release capsule Take 1 capsule by mouth daily 22   Satish Warner MD   famotidine (PEPCID) 20 MG tablet Take 1 tablet by mouth 2 times daily 22   Satish Warner MD   furosemide (LASIX) 20 MG tablet Take 1 tablet by mouth daily 22   Satish Warner MD   aspirin 81 MG tablet Take 1 tablet by mouth daily    Satish Warner MD   atenolol (TENORMIN) 50 MG tablet Take 1 tablet by mouth daily 8/10/17   Satish Warner MD   nitroGLYCERIN (NITROSTAT) 0.4 MG SL tablet Place 1 tablet under the tongue every 5 minutes as needed 08   Satish Warner MD   pantoprazole (PROTONIX) 40 MG tablet Take 1 tablet by mouth at bedtime 8/15/17   Satish Warner MD   rOPINIRole (REQUIP) 2 MG tablet Take 0.5 tablets by mouth 4 times daily 1/10/17   Satish Warner MD   lisinopril (PRINIVIL;ZESTRIL) 10 MG tablet Take 1 tablet by mouth daily    Satish Warner MD   potassium chloride (KLOR-CON M) 20 MEQ TBCR extended release tablet Take 1 tablet by mouth daily (with breakfast)    Satish Warner MD       Current medications:    No current facility-administered medications for this encounter.       Allergies:    Allergies   Allergen Reactions   • Gabapentin Other (See Comments)     Blurred vision   • Penicillins Rash  No results found for: \"PREGTESTUR\", \"PREGSERUM\", \"HCG\", \"HCGQUANT\"     ABGs: No results found for: \"PHART\", \"PO2ART\", \"QAB6BME\", \"BUB3KER\", \"BEART\", \"O5IVLSBW\"     Type & Screen (If Applicable):  No results found for: \"LABABO\"    Drug/Infectious Status (If Applicable):  Lab Results   Component Value Date/Time    HEPCAB Non-Reactive (Negative) 08/05/2011 09:05 AM       COVID-19 Screening (If Applicable):   Lab Results   Component Value Date/Time    COVID19 Not Detected 12/06/2022 10:51 AM           Anesthesia Evaluation  Patient summary reviewed   no history of anesthetic complications:   Airway: Mallampati: II  TM distance: >3 FB   Neck ROM: full  Mouth opening: > = 3 FB   Dental:          Pulmonary:Negative Pulmonary ROS       (-) shortness of breath and not a current smoker                           Cardiovascular:  Exercise tolerance: no interval change  (+) hypertension:, past MI:, CAD: no interval change, CABG/stent: no interval change, dysrhythmias: atrial fibrillation, hyperlipidemia               ROS comment: Dr Phan gave Cardiac clearance for upcoming CEA 4/16/24 and todays EGD - per primary care note.    NSTEMI 2/19/24, CARA to LAD  S/p watchman  On plavix - last does yesterday  DNR - suspended periop       Neuro/Psych:   (+) TIA             ROS comment: TIA 1995, pt scheduled for CEA 4/16/24 at  GI/Hepatic/Renal:   (+) GERD:, PUD, liver disease:         ROS comment: S/p food bolus 3/29/24  dysphagia  Fatty liver.   Endo/Other: Negative Endo/Other ROS                    Abdominal:             Vascular: negative vascular ROS.         Other Findings:       Anesthesia Plan      MAC     ASA 4       Induction: intravenous.      Anesthetic plan and risks discussed with patient.      Plan discussed with CRNA.    Attending anesthesiologist reviewed and agrees with Preprocedure content            Hiren Bahena MD   11/20/2024

## 2024-11-20 NOTE — H&P
Gastro[facility]oloy   Attending Pre-operative History and Physical      PROCEDURE:  EGD    Indication:The patient is a 84 y.o. female presents for an upper endoscopy.    Past Medical History:    Past Medical History:   Diagnosis Date    A-fib (HCC)     Acid reflux     Arthritis     Atrial fibrillation (HCC)     CAD (coronary artery disease)     Diarrhea     Dysphagia 03/2023    \"my throat has problems closing up.\"    Fatty liver     Hyperlipidemia     Hypertension     NSTEMI (non-ST elevated myocardial infarction) (HCC)     stent placed 2/19/24    Prolonged emergence from general anesthesia     FAMILY HX-BROTHER-AFTER HEART SURGERY    Restless leg     Stomach ulcer     TIA (transient ischemic attack) 1995    NO RESIDUAL      Past Surgical History:    Past Surgical History:   Procedure Laterality Date    CAROTID ENDARTERECTOMY Right 1995    CAROTID ENDARTERECTOMY Left     CHOLECYSTECTOMY      COLONOSCOPY      CORONARY ANGIOPLASTY WITH STENT PLACEMENT  2014    X4    CORONARY ANGIOPLASTY WITH STENT PLACEMENT  02/19/2024    x2    DILATION AND CURETTAGE OF UTERUS N/A 04/02/2019    HYSTEROSCOPY DILATION AND CURETTAGE POLYP REMOVAL performed by Zahira Wilkes MD at [facility] OR    EYE SURGERY      LIPOMA RESECTION      LIPOMA REMOVAL SHOULDER    UPPER GASTROINTESTINAL ENDOSCOPY N/A 02/10/2023    ESOPHAGOGASTRODUODENOSCOPY WITH BALLOON DILATION OF ESOPHAGUS performed by Alex Miranda MD at [facility] ENDOSCOPY    UPPER GASTROINTESTINAL ENDOSCOPY N/A 03/17/2023    EGD DILATION BALLOON performed by Alex Miranda MD at [facility] ENDOSCOPY    UPPER GASTROINTESTINAL ENDOSCOPY N/A 09/08/2023    ESOPHAGOGASTRODUODENOSCOPY WITH DILATION performed by Alex Miranda MD at [facility] ENDOSCOPY    UPPER GASTROINTESTINAL ENDOSCOPY N/A 03/29/2024    ESOPHAGOGASTRODUODENOSCOPY FOREIGN BODY REMOVAL performed by Harjit Blanco DO at [facility] ENDOSCOPY    UPPER GASTROINTESTINAL ENDOSCOPY N/A 4/12/2024    ESOPHAGOGASTRODUODENOSCOPY    Substance and Sexual Activity    Alcohol use: Yes     Comment: social    Drug use: Never    Sexual activity: Not Currently   Other Topics Concern    Not on file   Social History Narrative    Not on file     Social Determinants of Health     Financial Resource Strain: Not on file   Food Insecurity: Not At Risk (3/15/2024)    Received from [employer] and [facility]    Food Insecurities     Within the past 12 months, did you worry that your food would run out before you got money to buy more?: No     Within the past 12 months, did the food you bought just not last and you didn't have money to get more?: No   Transportation Needs: Not At Risk (3/15/2024)    Received from [employer] and [employer]    Transportation     Within the past 12 months, has a lack of transportation kept you from medical appointments or from doing things needed for daily living?: No   Physical Activity: Not on file   Stress: Not on file   Social Connections: Not on file   Intimate Partner Violence: Not At Risk (3/15/2024)    Received from [employer] and [facility]ADE [facility]    Interpersonal Safety     Do you feel physically or emotionally unsafe where you currently live?: No     Within the past 12 months, have you been hit, slapped, kicked or otherwise physically hurt by anyone? : No     Within the past 12 months, have you been hit, slapped, kicked or otherwise physically hurt by anyone? : No   Housing Stability: Not At Risk (3/15/2024)    Received from [employer] and [facility]ADE TO [facility]ATE [facility]    Housing/Utilities     Are you worried about losing your housing? : No     Within the past 12 months, have you ever stayed: outside, in a car, in a tent, in an overnight shelter, or temporarily in someone else's home (i.e. couch-surfing)?: No     Within the past 12 months, have you been unable to get utilities (heat, electricity) when it was really needed?: No      Family History:   Family History   Problem Relation

## 2024-11-20 NOTE — DISCHARGE INSTRUCTIONS
Endoscopy Discharge Instructions    Call @[facility] with any questions or concerns.    You may be drowsy or lightheaded after receiving sedation.  DO NOT operate  a vehicle (automobile, bicycle, motorcycle, machinery, or power tools), no  alcoholic beverages, and do not make any important decisions today.                 Plan on bed rest or quiet relaxation today.  Resume normal activities in the morning.     Resume normal activity tomorrow unless otherwise advised by your physician.            Eat a light first meal, avoiding spicy and fatty foods, then resume normal diet unless  you are told otherwise by your physician.    If the intravenous medication site is painful, apply warm compresses on the site until the soreness is relieved and elevate the arm above the heart. Call your physician if no improvement  in 2-3 days.       POSSIBLE SYMPTOMS TO WATCH:     1. fever (greater than 100) 5. increased abdominal bloating   2. severe pain   6. excessive bleeding   3. nausea and vomiting  7. chest pain   4. chills    8. shortness of breath       [facility] if these problems occur     Expected as normal and remedies:  Sore throat: use over the counter throat lozenges or gargle with warm salt water.  Redness or soreness at the IV site: apply warm compress  Gaseous discomfort: belching or passing flatus (gas).    Soft diet. Resume plavix. Repeat upper endoscopy and dilation in 2-3 weeks.    Alex Miranda MD    With questions or concerns call Dr Miranda at .

## 2024-11-20 NOTE — PROGRESS NOTES
PT received into room 3 from PACU. Report obtained. Vss. Pt stable. Denies pain. Assisted up to bathroom with walker. Daughter to room. PO provided.

## 2024-12-04 NOTE — ANESTHESIA POSTPROCEDURE EVALUATION
Department of Anesthesiology  Postprocedure Note    Patient: Vlad Carrillo  MRN: [id number]  YOB: 1940  Date of evaluation: 12/4/2024    Procedure Summary       Date: 11/20/24 Room / Location: [address]    Anesthesia Start: 1605 Anesthesia Stop: 1637    Procedures:       ESOPHAGOGASTRODUODENOSCOPY DILATION BALLOON      ESOPHAGOGASTRODUODENOSCOPY BIOPSY Diagnosis:       Dysphagia, unspecified type      (Dysphagia, unspecified type [R13.10])    Surgeons: Alex Miranda MD Responsible Provider: Hiren Bahena MD    Anesthesia Type: MAC ASA Status: 4            Anesthesia Type: No value filed.    Saad Phase I: Saad Score: 10    Saad Phase II: Saad Score: 10    Anesthesia Post Evaluation    Patient location during evaluation: PACU  Level of consciousness: awake and alert  Airway patency: patent  Nausea & Vomiting: no nausea and no vomiting  Cardiovascular status: blood pressure returned to baseline  Respiratory status: acceptable  Hydration status: euvolemic  Comments: Postoperative Anesthesia Note    Name:    Vlad Carrillo  MRN:      [id number]    Patient Vitals in the past 12 hrs:     LABS:    CBC  Lab Results       Component                Value               Date/Time                  WBC                      6.5                 11/17/2024 07:41 AM        HGB                      12.4                11/17/2024 07:41 AM        HCT                      37.1                11/17/2024 07:41 AM        PLT                      182                 11/17/2024 07:41 AM   RENAL  Lab Results       Component                Value               Date/Time                  NA                       143                 11/17/2024 07:41 AM        K                        3.5                 11/17/2024 07:41 AM        CL                       102                 11/17/2024 07:41 AM        CO2                      30                  11/17/2024 07:41 AM

## 2024-12-12 NOTE — PROGRESS NOTES
[facility] PRE-OPERATIVE INSTRUCTIONS    Day of Procedure: 12/27/24               Arrival time:   1000             Surgery time: 1130    Take the following medications with a sip of water:  Follow your MD/Surgeons pre-procedure instructions regarding your medications     Do not eat or drink anything after 12:00 midnight prior to your surgery.  This includes water, chewing gum, mints and ice chips.   You may brush your teeth and gargle the morning of your surgery, but do not swallow the water.     Please see your family doctor/pediatrician for a history and physical and/or concerning medications.   Bring any test results/reports from your physicians office.   If you are under the care of a heart doctor or specialist doctor, please be aware that you may be asked to see them for clearance.    You may be asked to stop blood thinners such as Coumadin, Plavix, Fragmin, Lovenox, etc., or any anti-inflammatories such as:  Aspirin, Ibuprofen, Advil, Naproxen prior to your surgery.    We also ask that you stop any over the counter medications such as fish oil, vitamin E, glucosamine, garlic, Multivitamins, COQ 10, etc.    We ask that you do not smoke 24 hours prior to surgery.  We ask that you do not  drink any alcoholic beverages 24 hours prior to surgery     You must make arrangements for a responsible adult to take you home after your surgery.    For your safety, you will not be allowed to leave alone or drive yourself home.  Your surgery will be cancelled if you do not have a ride home.     Also for your safety, you must have someone stay with you the first 24 hours after your surgery.     A parent or legal guardian must accompany a child scheduled for surgery and plan to stay at the hospital until the child is discharged.    Please do not bring other children with you.    For your comfort, please wear simple loose fitting clothing to the hospital.  Please do not bring valuables.    Do not wear any make-up on

## 2024-12-19 ENCOUNTER — OFFICE VISIT (OUTPATIENT)
Dept: UROGYNECOLOGY | Age: 84
End: 2024-12-19
Payer: MEDICARE

## 2024-12-19 VITALS
DIASTOLIC BLOOD PRESSURE: 78 MMHG | SYSTOLIC BLOOD PRESSURE: 133 MMHG | HEART RATE: 67 BPM | RESPIRATION RATE: 16 BRPM | OXYGEN SATURATION: 97 %

## 2024-12-19 DIAGNOSIS — N32.81 OAB (OVERACTIVE BLADDER): Primary | ICD-10-CM

## 2024-12-19 DIAGNOSIS — N39.41 URGE INCONTINENCE: ICD-10-CM

## 2024-12-19 PROCEDURE — 3075F SYST BP GE 130 - 139MM HG: CPT | Performed by: NURSE PRACTITIONER

## 2024-12-19 PROCEDURE — 99203 OFFICE O/P NEW LOW 30 MIN: CPT | Performed by: NURSE PRACTITIONER

## 2024-12-19 PROCEDURE — G8400 PT W/DXA NO RESULTS DOC: HCPCS | Performed by: NURSE PRACTITIONER

## 2024-12-19 PROCEDURE — 1123F ACP DISCUSS/DSCN MKR DOCD: CPT | Performed by: NURSE PRACTITIONER

## 2024-12-19 PROCEDURE — 1090F PRES/ABSN URINE INCON ASSESS: CPT | Performed by: NURSE PRACTITIONER

## 2024-12-19 PROCEDURE — 3078F DIAST BP <80 MM HG: CPT | Performed by: NURSE PRACTITIONER

## 2024-12-19 PROCEDURE — G8484 FLU IMMUNIZE NO ADMIN: HCPCS | Performed by: NURSE PRACTITIONER

## 2024-12-19 PROCEDURE — 1160F RVW MEDS BY RX/DR IN RCRD: CPT | Performed by: NURSE PRACTITIONER

## 2024-12-19 PROCEDURE — 1036F TOBACCO NON-USER: CPT | Performed by: NURSE PRACTITIONER

## 2024-12-19 PROCEDURE — G8419 CALC BMI OUT NRM PARAM NOF/U: HCPCS | Performed by: NURSE PRACTITIONER

## 2024-12-19 PROCEDURE — 0509F URINE INCON PLAN DOCD: CPT | Performed by: NURSE PRACTITIONER

## 2024-12-19 PROCEDURE — 1159F MED LIST DOCD IN RCRD: CPT | Performed by: NURSE PRACTITIONER

## 2024-12-19 PROCEDURE — G8427 DOCREV CUR MEDS BY ELIG CLIN: HCPCS | Performed by: NURSE PRACTITIONER

## 2024-12-19 RX ORDER — ATORVASTATIN CALCIUM 10 MG/1
1 TABLET, FILM COATED ORAL NIGHTLY
COMMUNITY

## 2024-12-19 ASSESSMENT — ENCOUNTER SYMPTOMS
BACK PAIN: 1
SINUS PRESSURE: 1
DIARRHEA: 1
COUGH: 1
CONSTIPATION: 1
CHEST TIGHTNESS: 1

## 2024-12-19 NOTE — PROGRESS NOTES
12/19/2024      HPI:     Name: Vlad Carrillo  YOB: 1940    CC: Patient is a 84 y.o. female who is seen in consultation from Referral, Self   for evaluation of Urinary frequency.     HPI:   Surya presents with her daughter Marie for consult regarding urinary incontinence.  She has been evaluated in June by Dr. Wu at [facility] and diagnosed with OAB.  Has had difficulty tolerating medications as below.  Desires to continue further care here.    Tolterodine: blurred vision  Myrbetriq (cardiologist does not want used)  Gemtessa : double vision    Bladder emptying problems:  Yes   How many months have you had a bladder problem? 2 years  Do you use pads to absorb lost urine? Yes. If yes how many pads do you wear a day? 5-6   How many trips do you make to the bathroom during the day? 5-6  How many times do you wake at night to No  Are there times when you cannot make it to the bathroom on time? Yes  Does sound, sight, or feel of running water cause you to lose urine? Yes  How many ounces of liquid do you consume daily? 8 decaf  Which best describes urine loss: (Check all that apply)  [x] I lose urine during coughing, sneezing, running, lifting  [x] I lose urine with changes in posture, standing, walking  [] I lose urine continuously such that I am constantly wet  [x] I have sudden, urgent needs without the ability to make it to the bathroom  Have you seen a physician for complaints of urine loss? Yes If yes, who? Jeung  Have you taken medication to prevent urine loss? No     Bladder Emptying Problems:Yes  How long have you had bladder emptying problems? 2 years  Do you notice any dribbling of urine when you stand after passing urine? Yes  Do you usually have difficulty starting your urine stream? No  Do you have to assume abnormal positions to urinate? No   Do you have to strain to empty your bladder? No  Do you feel as if your bladder is empty after passing urine? Yes  Is your urine flow: strong

## 2024-12-26 ENCOUNTER — ANESTHESIA EVENT (OUTPATIENT)
Dept: ENDOSCOPY | Age: 84
End: 2024-12-26
Payer: MEDICARE

## 2024-12-27 ENCOUNTER — ANESTHESIA (OUTPATIENT)
Dept: ENDOSCOPY | Age: 84
End: 2024-12-27
Payer: MEDICARE

## 2024-12-27 ENCOUNTER — HOSPITAL ENCOUNTER (OUTPATIENT)
Age: 84
Setting detail: OUTPATIENT SURGERY
Discharge: HOME | End: 2024-12-27
Attending: INTERNAL MEDICINE | Admitting: INTERNAL MEDICINE
Payer: MEDICARE

## 2024-12-27 ENCOUNTER — APPOINTMENT (OUTPATIENT)
Dept: ENDOSCOPY | Age: 84
End: 2024-12-27
Attending: INTERNAL MEDICINE
Payer: MEDICARE

## 2024-12-27 VITALS
DIASTOLIC BLOOD PRESSURE: 51 MMHG | TEMPERATURE: 97.2 F | BODY MASS INDEX: 24.91 KG/M2 | SYSTOLIC BLOOD PRESSURE: 106 MMHG | WEIGHT: 155 LBS | HEIGHT: 66 IN | HEART RATE: 66 BPM | OXYGEN SATURATION: 96 % | RESPIRATION RATE: 16 BRPM

## 2024-12-27 PROCEDURE — 3609017700 HC EGD DILATION GASTRIC/DUODENAL STRICTURE: Performed by: INTERNAL MEDICINE

## 2024-12-27 PROCEDURE — 6360000002 HC RX W HCPCS

## 2024-12-27 PROCEDURE — C1726 CATH, BAL DIL, NON-VASCULAR: HCPCS | Performed by: INTERNAL MEDICINE

## 2024-12-27 PROCEDURE — 3700000001 HC ADD 15 MINUTES (ANESTHESIA): Performed by: INTERNAL MEDICINE

## 2024-12-27 PROCEDURE — 7100000011 HC PHASE II RECOVERY - ADDTL 15 MIN: Performed by: INTERNAL MEDICINE

## 2024-12-27 PROCEDURE — 2709999900 HC NON-CHARGEABLE SUPPLY: Performed by: INTERNAL MEDICINE

## 2024-12-27 PROCEDURE — 3700000000 HC ANESTHESIA ATTENDED CARE: Performed by: INTERNAL MEDICINE

## 2024-12-27 PROCEDURE — 7100000000 HC PACU RECOVERY - FIRST 15 MIN: Performed by: INTERNAL MEDICINE

## 2024-12-27 PROCEDURE — 2500000003 HC RX 250 WO HCPCS: Performed by: ANESTHESIOLOGY

## 2024-12-27 PROCEDURE — 7100000010 HC PHASE II RECOVERY - FIRST 15 MIN: Performed by: INTERNAL MEDICINE

## 2024-12-27 RX ORDER — SULFAMETHOXAZOLE AND TRIMETHOPRIM 800; 160 MG/1; MG/1
1 TABLET ORAL 2 TIMES DAILY
COMMUNITY

## 2024-12-27 RX ORDER — LIDOCAINE HYDROCHLORIDE 20 MG/ML
INJECTION, SOLUTION EPIDURAL; INFILTRATION; INTRACAUDAL; PERINEURAL
Status: DISCONTINUED | OUTPATIENT
Start: 2024-12-27 | End: 2024-12-27 | Stop reason: SDUPTHER

## 2024-12-27 RX ORDER — SODIUM CHLORIDE 0.9 % (FLUSH) 0.9 %
5-40 SYRINGE (ML) INJECTION EVERY 12 HOURS SCHEDULED
Status: DISCONTINUED | OUTPATIENT
Start: 2024-12-27 | End: 2024-12-27 | Stop reason: HOSPADM

## 2024-12-27 RX ORDER — SODIUM CHLORIDE 0.9 % (FLUSH) 0.9 %
5-40 SYRINGE (ML) INJECTION PRN
Status: DISCONTINUED | OUTPATIENT
Start: 2024-12-27 | End: 2024-12-27 | Stop reason: HOSPADM

## 2024-12-27 RX ORDER — SODIUM CHLORIDE 9 MG/ML
INJECTION, SOLUTION INTRAVENOUS PRN
Status: DISCONTINUED | OUTPATIENT
Start: 2024-12-27 | End: 2024-12-27 | Stop reason: HOSPADM

## 2024-12-27 RX ORDER — CEPHALEXIN 500 MG/1
500 CAPSULE ORAL 4 TIMES DAILY
COMMUNITY
Start: 2024-12-26

## 2024-12-27 RX ORDER — NALOXONE HYDROCHLORIDE 0.4 MG/ML
INJECTION, SOLUTION INTRAMUSCULAR; INTRAVENOUS; SUBCUTANEOUS PRN
Status: DISCONTINUED | OUTPATIENT
Start: 2024-12-27 | End: 2024-12-27 | Stop reason: HOSPADM

## 2024-12-27 RX ORDER — PROPOFOL 10 MG/ML
INJECTION, EMULSION INTRAVENOUS
Status: DISCONTINUED | OUTPATIENT
Start: 2024-12-27 | End: 2024-12-27 | Stop reason: SDUPTHER

## 2024-12-27 RX ADMIN — SODIUM CHLORIDE, PRESERVATIVE FREE 10 ML: 5 INJECTION INTRAVENOUS at 11:40

## 2024-12-27 RX ADMIN — PROPOFOL 50 MG: 10 INJECTION, EMULSION INTRAVENOUS at 11:31

## 2024-12-27 RX ADMIN — LIDOCAINE HYDROCHLORIDE 80 MG: 20 INJECTION, SOLUTION EPIDURAL; INFILTRATION; INTRACAUDAL; PERINEURAL at 11:31

## 2024-12-27 RX ADMIN — PROPOFOL 140 MCG/KG/MIN: 10 INJECTION, EMULSION INTRAVENOUS at 11:32

## 2024-12-27 ASSESSMENT — PAIN - FUNCTIONAL ASSESSMENT
PAIN_FUNCTIONAL_ASSESSMENT: 0-10
PAIN_FUNCTIONAL_ASSESSMENT: 0-10

## 2024-12-27 ASSESSMENT — LIFESTYLE VARIABLES: SMOKING_STATUS: 0

## 2024-12-27 ASSESSMENT — ENCOUNTER SYMPTOMS: SHORTNESS OF BREATH: 0

## 2024-12-27 NOTE — PROGRESS NOTES
Pt received into room 3 from PACU. Report obtained. Vss. Pt stable. Denies pain. Text to daughter. PO provided.

## 2024-12-27 NOTE — OP NOTE
Esophagogastroduodenoscopy Note    Patient:   Vlad Carrillo    :    1940    Facility:   [facility] [Outpatient]   Referring/PCP: Eliud Leach MD    Procedure:   Esophagogastroduodenoscopy   Date:     2024   Endoscopist:  Alex Miranda MD     Preoperative Diagnosis:    esophageal stricture    Postoperative Diagnosis:  1.  Stricture at the gastroesophageal junction dilated with a through-the-scope balloon to 12 mm, 13.5 mm and then 15 mm.  Shallow tear with minimal oozing of blood was noted post dilation.  2.  Hiatal hernia  3.  Mild gastritis  4.  Normal duodenum    Anesthesia:  MAC    Estimated blood loss: Minimal    Complications: None    Specimen: no    Instrument:     Description of Procedure:  Informed consent was obtained from the patient after explanation of the procedure including indications, description of the procedure,  benefits and possible risks and complications of the procedure, and alternatives. Questions were answered.  The patient's history was reviewed and a directed physical examination was performed prior to the procedure.    Patient was monitored throughout the procedure with pulse oximetry and periodic assessment of vital signs. Patient was sedated as noted above. With the patient in the left lateral decubitus position, the Olympus videoendoscope was placed in the patient's mouth and under direct visualization passed into the esophagus.  Visualization of the esophagus, stomach, and duodenum was performed during both introduction and withdrawal of the endoscope and retroflexed view of the proximal stomach was obtained. The scope was passed to the 2nd portion of the duodenum.  The patient tolerated the procedure well and was taken to the recovery area in good condition.    Findings: The mucosa in the esophagus is normal.  The distal esophagus is tortuous.  A stricture was noted at the gastroesophageal junction.  We

## 2024-12-27 NOTE — DISCHARGE INSTRUCTIONS
Recommendations:  1.  Continue PPI in the long-term  2.  Follow-up as needed.     ENDOSCOPY DISCHARGE INSTRUCTIONS    You may experience some lightheadedness for the next several hours.  Plan on quiet relaxation for the rest of today.  A responsible adult needs to stay with you today.  Because of the medications you received today-do not drive,operate machinery,or sign any contractual agreement for the next 24 hours.  Do not drink any alcoholic beverages or take any unprescribed medications tonight.  Eat bland food and avoid anything greasy or spicy initially-progress to your normal diet gradually.  Diet restrictions as instructed.  You may resume home medications as instructed.  It is not unusual to experience some mild cramping or gas pains, and you may not have a bowel movement for several days.  If you have any of the following problems, notify your physician or return to the hospital emergency room : fever, chills, excessive bleeding, excessive vomiting, difficulty swallowing, uncontrolled pain, increased abdominal distention, shortness of breath or any other problems.  If you had a polyp removed, avoid strenuous activity for 48 hours.Avoid the use of aspirin or related compounds for one week, unless otherwise instructed by your physician.  You may notice a small amount of blood in your next few bowel movements, but if a large amount passes, call your physician.  If you have a sore throat, you may use lozenges or salt water gargles.  If you had biopsy's taken from your procedure call the office for results in 5-7 days.     ANESTHESIA DISCHARGE INSTRUCTIONS    Wear your seatbelt home.  You are under the influence of drugs-do not drink alcohol,drive,operate machinery,or make any important decisions or sign any legal documentsfor 24 hours  A responsible adult needs to be with you for 24 hours.  You may experience lightheadedness,dizziness,or sleepiness following surgery.  Rest at home today- increase activity as

## 2024-12-27 NOTE — ANESTHESIA PRE PROCEDURE
ENDOSCOPY    UPPER GASTROINTESTINAL ENDOSCOPY N/A 04/12/2024    ESOPHAGOGASTRODUODENOSCOPY performed by Alex Miranda MD at [facility] ENDOSCOPY    UPPER GASTROINTESTINAL ENDOSCOPY N/A 11/20/2024    ESOPHAGOGASTRODUODENOSCOPY DILATION BALLOON performed by Alex Miranda MD at [facility] ENDOSCOPY    UPPER GASTROINTESTINAL ENDOSCOPY  11/20/2024    ESOPHAGOGASTRODUODENOSCOPY BIOPSY performed by Alex Miranda MD at [facility] ENDOSCOPY       Social History:    Social History     Tobacco Use    Smoking status: Never    Smokeless tobacco: Never   Substance Use Topics    Alcohol use: Yes     Comment: social                                Counseling given: Not Answered      Vital Signs (Current):   Vitals:    12/12/24 1034   Weight: 70.3 kg (155 lb)   Height: 1.676 m (5' 6\")                                              BP Readings from Last 3 Encounters:   12/19/24 133/78   11/20/24 (!) 132/59   11/17/24 (!) 132/57       NPO Status:                                                                                 BMI:   Wt Readings from Last 3 Encounters:   12/12/24 70.3 kg (155 lb)   11/20/24 68.5 kg (151 lb)   11/17/24 71 kg (156 lb 8.4 oz)     Body mass index is 25.02 kg/m².    CBC:   Lab Results   Component Value Date/Time    WBC 6.5 11/17/2024 07:41 AM    RBC 4.07 11/17/2024 07:41 AM    HGB 12.4 11/17/2024 07:41 AM    HCT 37.1 11/17/2024 07:41 AM    MCV 91.2 11/17/2024 07:41 AM    RDW 15.3 11/17/2024 07:41 AM     11/17/2024 07:41 AM       CMP:   Lab Results   Component Value Date/Time     11/17/2024 07:41 AM    K 3.5 11/17/2024 07:41 AM     11/17/2024 07:41 AM    CO2 30 11/17/2024 07:41 AM    BUN 19 11/17/2024 07:41 AM    CREATININE 0.7 11/17/2024 07:41 AM    GFRAA >60 04/17/2020 06:00 PM    GFRAA >60 10/06/2011 09:15 AM    AGRATIO 1.5 03/29/2024 02:43 PM    LABGLOM 86 11/17/2024 07:41 AM    LABGLOM >90 03/29/2024 02:43 PM    GLUCOSE 78 11/17/2024 07:41 AM    CALCIUM 9.7 11/17/2024 07:41 AM    BILITOT

## 2024-12-27 NOTE — H&P
Gastro[facility]   Attending Pre-operative History and Physical      PROCEDURE:  EGD    Indication:The patient is a 84 y.o. female presents for an upper endoscopy.    Past Medical History:    Past Medical History:   Diagnosis Date    Acid reflux     Arthritis     Atrial fibrillation (HCC)     CAD (coronary artery disease)     Diarrhea     Dysphagia 03/2023    \"my throat has problems closing up.\"    Fatty liver     Hyperlipidemia     Hypertension     NSTEMI (non-ST elevated myocardial infarction) (HCC)     stent placed 2/19/24    Overactive bladder     Prolonged emergence from general anesthesia     FAMILY HX-BROTHER-AFTER HEART SURGERY    Restless leg     Stomach ulcer     TIA (transient ischemic attack) 1995    NO RESIDUAL      Past Surgical History:    Past Surgical History:   Procedure Laterality Date    CAROTID ENDARTERECTOMY Right 1995    CAROTID ENDARTERECTOMY Left     CHOLECYSTECTOMY      COLONOSCOPY      CORONARY ANGIOPLASTY WITH STENT PLACEMENT  2014    X4    CORONARY ANGIOPLASTY WITH STENT PLACEMENT  02/19/2024    x2    DILATION AND CURETTAGE OF UTERUS N/A 04/02/2019    HYSTEROSCOPY DILATION AND CURETTAGE POLYP REMOVAL performed by Zahira Wilkes MD at [facility] OR    EYE SURGERY Bilateral     cataract removal with lens implant    LIPOMA RESECTION      LIPOMA REMOVAL SHOULDER    UPPER GASTROINTESTINAL ENDOSCOPY N/A 02/10/2023    ESOPHAGOGASTRODUODENOSCOPY WITH BALLOON DILATION OF ESOPHAGUS performed by Alex Miranda MD at [facility] ENDOSCOPY    UPPER GASTROINTESTINAL ENDOSCOPY N/A 03/17/2023    EGD DILATION BALLOON performed by Alex Miranda MD at [facility] ENDOSCOPY    UPPER GASTROINTESTINAL ENDOSCOPY N/A 09/08/2023    ESOPHAGOGASTRODUODENOSCOPY WITH DILATION performed by Alex Miranda MD at [facility] ENDOSCOPY    UPPER GASTROINTESTINAL ENDOSCOPY N/A 03/29/2024    ESOPHAGOGASTRODUODENOSCOPY FOREIGN BODY REMOVAL performed by Harjit Blanco DO at [facility] ENDOSCOPY    UPPER GASTROINTESTINAL

## 2024-12-27 NOTE — ANESTHESIA POSTPROCEDURE EVALUATION
Department of Anesthesiology  Postprocedure Note    Patient: Vlad Carrillo  MRN: [id number]  YOB: 1940  Date of evaluation: 12/27/2024    Procedure Summary       Date: 12/27/24 Room / Location: [address]    Anesthesia Start: 1121 Anesthesia Stop: 1141    Procedure: ESOPHAGOGASTRODUODENOSCOPY WITH BALLOON DILATION Diagnosis:       Dysphagia, unspecified type      (Dysphagia, unspecified type [R13.10])    Surgeons: Alex Miranda MD Responsible Provider: Yusef Simon MD    Anesthesia Type: MAC ASA Status: 3            Anesthesia Type: MAC    Saad Phase I: Saad Score: 10    Saad Phase II: Saad Score: 10    Anesthesia Post Evaluation    Patient location during evaluation: bedside  Patient participation: complete - patient participated  Level of consciousness: awake and alert  Pain score: 0  Nausea & Vomiting: no nausea  Cardiovascular status: hemodynamically stable  Respiratory status: acceptable  Hydration status: stable  Pain management: adequate    No notable events documented.

## 2025-01-01 ENCOUNTER — HOSPITAL ENCOUNTER (INPATIENT)
Age: 85
LOS: 2 days | Discharge: HOME HEALTH | DRG: 554 | End: 2025-01-03
Attending: STUDENT IN AN ORGANIZED HEALTH CARE EDUCATION/TRAINING PROGRAM
Payer: MEDICARE

## 2025-01-01 DIAGNOSIS — R52 DIFFUSE PAIN: Primary | ICD-10-CM

## 2025-01-01 DIAGNOSIS — I95.9 HYPOTENSION, UNSPECIFIED HYPOTENSION TYPE: ICD-10-CM

## 2025-01-01 DIAGNOSIS — I48.91 RAPID ATRIAL FIBRILLATION (HCC): ICD-10-CM

## 2025-01-01 PROBLEM — M25.50 POLYARTHRALGIA: Status: ACTIVE | Noted: 2025-01-01

## 2025-01-01 LAB
ALBUMIN SERPL-MCNC: 3.6 G/DL (ref 3.4–5)
ALBUMIN/GLOB SERPL: 1.3 {RATIO} (ref 1.1–2.2)
ALP SERPL-CCNC: 77 U/L (ref 40–129)
ALT SERPL-CCNC: 15 U/L (ref 10–40)
ANION GAP SERPL CALCULATED.3IONS-SCNC: 11 MMOL/L (ref 3–16)
AST SERPL-CCNC: 24 U/L (ref 15–37)
BASOPHILS # BLD: 0 K/UL (ref 0–0.2)
BASOPHILS NFR BLD: 0.4 %
BILIRUB SERPL-MCNC: 1 MG/DL (ref 0–1)
BUN SERPL-MCNC: 29 MG/DL (ref 7–20)
C DIFF TOX A+B STL QL IA: NORMAL
CALCIUM SERPL-MCNC: 9 MG/DL (ref 8.3–10.6)
CHLORIDE SERPL-SCNC: 107 MMOL/L (ref 99–110)
CK SERPL-CCNC: 87 U/L (ref 26–192)
CO2 SERPL-SCNC: 18 MMOL/L (ref 21–32)
CREAT SERPL-MCNC: 1 MG/DL (ref 0.6–1.2)
CRP SERPL-MCNC: 112 MG/L (ref 0–5.1)
DEPRECATED RDW RBC AUTO: 14.4 % (ref 12.4–15.4)
EKG DIAGNOSIS: NORMAL
EKG Q-T INTERVAL: 310 MS
EKG QRS DURATION: 82 MS
EKG QTC CALCULATION (BAZETT): 428 MS
EKG R AXIS: 3 DEGREES
EKG T AXIS: 115 DEGREES
EKG VENTRICULAR RATE: 115 BPM
EOSINOPHIL # BLD: 0.2 K/UL (ref 0–0.6)
EOSINOPHIL NFR BLD: 2 %
ERYTHROCYTE [SEDIMENTATION RATE] IN BLOOD BY WESTERGREN METHOD: 28 MM/HR (ref 0–30)
GFR SERPLBLD CREATININE-BSD FMLA CKD-EPI: 56 ML/MIN/{1.73_M2}
GLUCOSE SERPL-MCNC: 98 MG/DL (ref 70–99)
HCT VFR BLD AUTO: 33.8 % (ref 36–48)
HGB BLD-MCNC: 11.2 G/DL (ref 12–16)
LYMPHOCYTES # BLD: 1 K/UL (ref 1–5.1)
LYMPHOCYTES NFR BLD: 11.5 %
MAGNESIUM SERPL-MCNC: 1.85 MG/DL (ref 1.8–2.4)
MCH RBC QN AUTO: 30.2 PG (ref 26–34)
MCHC RBC AUTO-ENTMCNC: 33.2 G/DL (ref 31–36)
MCV RBC AUTO: 91.1 FL (ref 80–100)
MONOCYTES # BLD: 1.1 K/UL (ref 0–1.3)
MONOCYTES NFR BLD: 12 %
NEUTROPHILS # BLD: 6.6 K/UL (ref 1.7–7.7)
NEUTROPHILS NFR BLD: 74.1 %
PHOSPHATE SERPL-MCNC: 2.9 MG/DL (ref 2.5–4.9)
PLATELET # BLD AUTO: 179 K/UL (ref 135–450)
PMV BLD AUTO: 8.1 FL (ref 5–10.5)
POTASSIUM SERPL-SCNC: 4.4 MMOL/L (ref 3.5–5.1)
PROT SERPL-MCNC: 6.3 G/DL (ref 6.4–8.2)
RBC # BLD AUTO: 3.71 M/UL (ref 4–5.2)
SODIUM SERPL-SCNC: 136 MMOL/L (ref 136–145)
WBC # BLD AUTO: 9 K/UL (ref 4–11)

## 2025-01-01 PROCEDURE — 1200000000 HC SEMI PRIVATE

## 2025-01-01 PROCEDURE — 2060000000 HC ICU INTERMEDIATE R&B

## 2025-01-01 PROCEDURE — 96374 THER/PROPH/DIAG INJ IV PUSH: CPT

## 2025-01-01 PROCEDURE — 93010 ELECTROCARDIOGRAM REPORT: CPT | Performed by: INTERNAL MEDICINE

## 2025-01-01 PROCEDURE — 6370000000 HC RX 637 (ALT 250 FOR IP)

## 2025-01-01 PROCEDURE — 36415 COLL VENOUS BLD VENIPUNCTURE: CPT

## 2025-01-01 PROCEDURE — 83516 IMMUNOASSAY NONANTIBODY: CPT

## 2025-01-01 PROCEDURE — 6360000002 HC RX W HCPCS: Performed by: EMERGENCY MEDICINE

## 2025-01-01 PROCEDURE — 6370000000 HC RX 637 (ALT 250 FOR IP): Performed by: EMERGENCY MEDICINE

## 2025-01-01 PROCEDURE — 82550 ASSAY OF CK (CPK): CPT

## 2025-01-01 PROCEDURE — 6360000002 HC RX W HCPCS: Performed by: STUDENT IN AN ORGANIZED HEALTH CARE EDUCATION/TRAINING PROGRAM

## 2025-01-01 PROCEDURE — 96361 HYDRATE IV INFUSION ADD-ON: CPT

## 2025-01-01 PROCEDURE — 99285 EMERGENCY DEPT VISIT HI MDM: CPT

## 2025-01-01 PROCEDURE — 6370000000 HC RX 637 (ALT 250 FOR IP): Performed by: STUDENT IN AN ORGANIZED HEALTH CARE EDUCATION/TRAINING PROGRAM

## 2025-01-01 PROCEDURE — 83735 ASSAY OF MAGNESIUM: CPT

## 2025-01-01 PROCEDURE — 2500000003 HC RX 250 WO HCPCS

## 2025-01-01 PROCEDURE — 80053 COMPREHEN METABOLIC PANEL: CPT

## 2025-01-01 PROCEDURE — 87324 CLOSTRIDIUM AG IA: CPT

## 2025-01-01 PROCEDURE — 6360000002 HC RX W HCPCS

## 2025-01-01 PROCEDURE — 86038 ANTINUCLEAR ANTIBODIES: CPT

## 2025-01-01 PROCEDURE — 2580000003 HC RX 258: Performed by: EMERGENCY MEDICINE

## 2025-01-01 PROCEDURE — 85025 COMPLETE CBC W/AUTO DIFF WBC: CPT

## 2025-01-01 PROCEDURE — 84100 ASSAY OF PHOSPHORUS: CPT

## 2025-01-01 PROCEDURE — 85652 RBC SED RATE AUTOMATED: CPT

## 2025-01-01 PROCEDURE — 86140 C-REACTIVE PROTEIN: CPT

## 2025-01-01 PROCEDURE — 2580000003 HC RX 258: Performed by: STUDENT IN AN ORGANIZED HEALTH CARE EDUCATION/TRAINING PROGRAM

## 2025-01-01 PROCEDURE — 87449 NOS EACH ORGANISM AG IA: CPT

## 2025-01-01 PROCEDURE — 96375 TX/PRO/DX INJ NEW DRUG ADDON: CPT

## 2025-01-01 PROCEDURE — 93005 ELECTROCARDIOGRAM TRACING: CPT | Performed by: EMERGENCY MEDICINE

## 2025-01-01 RX ORDER — 0.9 % SODIUM CHLORIDE 0.9 %
500 INTRAVENOUS SOLUTION INTRAVENOUS ONCE
Status: COMPLETED | OUTPATIENT
Start: 2025-01-01 | End: 2025-01-01

## 2025-01-01 RX ORDER — SODIUM CHLORIDE 0.9 % (FLUSH) 0.9 %
5-40 SYRINGE (ML) INJECTION PRN
Status: DISCONTINUED | OUTPATIENT
Start: 2025-01-01 | End: 2025-01-03 | Stop reason: HOSPADM

## 2025-01-01 RX ORDER — METHYLPREDNISOLONE SODIUM SUCCINATE 40 MG/ML
40 INJECTION INTRAMUSCULAR; INTRAVENOUS DAILY
Status: DISCONTINUED | OUTPATIENT
Start: 2025-01-01 | End: 2025-01-03 | Stop reason: HOSPADM

## 2025-01-01 RX ORDER — 0.9 % SODIUM CHLORIDE 0.9 %
500 INTRAVENOUS SOLUTION INTRAVENOUS ONCE
Status: DISCONTINUED | OUTPATIENT
Start: 2025-01-01 | End: 2025-01-01

## 2025-01-01 RX ORDER — CLOPIDOGREL BISULFATE 75 MG/1
75 TABLET ORAL DAILY
Status: DISCONTINUED | OUTPATIENT
Start: 2025-01-01 | End: 2025-01-03 | Stop reason: HOSPADM

## 2025-01-01 RX ORDER — MAGNESIUM SULFATE IN WATER 40 MG/ML
2000 INJECTION, SOLUTION INTRAVENOUS PRN
Status: DISCONTINUED | OUTPATIENT
Start: 2025-01-01 | End: 2025-01-03 | Stop reason: HOSPADM

## 2025-01-01 RX ORDER — FAMOTIDINE 20 MG/1
20 TABLET, FILM COATED ORAL DAILY
Status: DISCONTINUED | OUTPATIENT
Start: 2025-01-01 | End: 2025-01-03 | Stop reason: HOSPADM

## 2025-01-01 RX ORDER — PANTOPRAZOLE SODIUM 40 MG/1
40 TABLET, DELAYED RELEASE ORAL NIGHTLY
Status: DISCONTINUED | OUTPATIENT
Start: 2025-01-01 | End: 2025-01-03 | Stop reason: HOSPADM

## 2025-01-01 RX ORDER — LIDOCAINE 4 G/G
3 PATCH TOPICAL ONCE
Status: COMPLETED | OUTPATIENT
Start: 2025-01-01 | End: 2025-01-01

## 2025-01-01 RX ORDER — ACETAMINOPHEN 500 MG
1000 TABLET ORAL ONCE
Status: COMPLETED | OUTPATIENT
Start: 2025-01-01 | End: 2025-01-01

## 2025-01-01 RX ORDER — WATER 10 ML/10ML
INJECTION INTRAMUSCULAR; INTRAVENOUS; SUBCUTANEOUS
Status: COMPLETED
Start: 2025-01-01 | End: 2025-01-01

## 2025-01-01 RX ORDER — MORPHINE SULFATE 2 MG/ML
2 INJECTION, SOLUTION INTRAMUSCULAR; INTRAVENOUS ONCE
Status: COMPLETED | OUTPATIENT
Start: 2025-01-01 | End: 2025-01-01

## 2025-01-01 RX ORDER — ROPINIROLE 0.5 MG/1
0.5 TABLET, FILM COATED ORAL ONCE
Status: COMPLETED | OUTPATIENT
Start: 2025-01-01 | End: 2025-01-01

## 2025-01-01 RX ORDER — LOPERAMIDE HYDROCHLORIDE 2 MG/1
2 CAPSULE ORAL 4 TIMES DAILY PRN
Status: DISCONTINUED | OUTPATIENT
Start: 2025-01-01 | End: 2025-01-03 | Stop reason: HOSPADM

## 2025-01-01 RX ORDER — FUROSEMIDE 40 MG/1
20 TABLET ORAL DAILY
Status: DISCONTINUED | OUTPATIENT
Start: 2025-01-01 | End: 2025-01-03 | Stop reason: HOSPADM

## 2025-01-01 RX ORDER — ONDANSETRON 4 MG/1
4 TABLET, ORALLY DISINTEGRATING ORAL EVERY 8 HOURS PRN
Status: DISCONTINUED | OUTPATIENT
Start: 2025-01-01 | End: 2025-01-03 | Stop reason: HOSPADM

## 2025-01-01 RX ORDER — ATENOLOL 50 MG/1
50 TABLET ORAL ONCE
Status: DISCONTINUED | OUTPATIENT
Start: 2025-01-01 | End: 2025-01-01

## 2025-01-01 RX ORDER — FUROSEMIDE 40 MG/1
40 TABLET ORAL DAILY
COMMUNITY
Start: 2024-11-02

## 2025-01-01 RX ORDER — FAMOTIDINE 20 MG/1
20 TABLET, FILM COATED ORAL 2 TIMES DAILY
Status: DISCONTINUED | OUTPATIENT
Start: 2025-01-01 | End: 2025-01-01 | Stop reason: DRUGHIGH

## 2025-01-01 RX ORDER — KETOROLAC TROMETHAMINE 30 MG/ML
30 INJECTION, SOLUTION INTRAMUSCULAR; INTRAVENOUS EVERY 6 HOURS PRN
Status: DISCONTINUED | OUTPATIENT
Start: 2025-01-01 | End: 2025-01-01 | Stop reason: DRUGHIGH

## 2025-01-01 RX ORDER — KETOROLAC TROMETHAMINE 15 MG/ML
10 INJECTION, SOLUTION INTRAMUSCULAR; INTRAVENOUS ONCE
Status: COMPLETED | OUTPATIENT
Start: 2025-01-01 | End: 2025-01-01

## 2025-01-01 RX ORDER — KETOROLAC TROMETHAMINE 15 MG/ML
15 INJECTION, SOLUTION INTRAMUSCULAR; INTRAVENOUS EVERY 6 HOURS PRN
Status: DISCONTINUED | OUTPATIENT
Start: 2025-01-01 | End: 2025-01-03 | Stop reason: HOSPADM

## 2025-01-01 RX ORDER — LISINOPRIL 10 MG/1
10 TABLET ORAL DAILY
Status: DISCONTINUED | OUTPATIENT
Start: 2025-01-01 | End: 2025-01-03 | Stop reason: HOSPADM

## 2025-01-01 RX ORDER — PREDNISONE 20 MG/1
40 TABLET ORAL DAILY
Status: DISCONTINUED | OUTPATIENT
Start: 2025-01-01 | End: 2025-01-01

## 2025-01-01 RX ORDER — 0.9 % SODIUM CHLORIDE 0.9 %
1000 INTRAVENOUS SOLUTION INTRAVENOUS ONCE
Status: COMPLETED | OUTPATIENT
Start: 2025-01-01 | End: 2025-01-01

## 2025-01-01 RX ORDER — SODIUM CHLORIDE 9 MG/ML
INJECTION, SOLUTION INTRAVENOUS PRN
Status: DISCONTINUED | OUTPATIENT
Start: 2025-01-01 | End: 2025-01-03 | Stop reason: HOSPADM

## 2025-01-01 RX ORDER — POLYETHYLENE GLYCOL 3350 17 G/17G
17 POWDER, FOR SOLUTION ORAL DAILY PRN
Status: DISCONTINUED | OUTPATIENT
Start: 2025-01-01 | End: 2025-01-03 | Stop reason: HOSPADM

## 2025-01-01 RX ORDER — ENOXAPARIN SODIUM 100 MG/ML
40 INJECTION SUBCUTANEOUS DAILY
Status: DISCONTINUED | OUTPATIENT
Start: 2025-01-01 | End: 2025-01-03 | Stop reason: HOSPADM

## 2025-01-01 RX ORDER — ATENOLOL 50 MG/1
50 TABLET ORAL DAILY
Status: DISCONTINUED | OUTPATIENT
Start: 2025-01-01 | End: 2025-01-03 | Stop reason: HOSPADM

## 2025-01-01 RX ORDER — POTASSIUM CHLORIDE 1500 MG/1
40 TABLET, EXTENDED RELEASE ORAL PRN
Status: DISCONTINUED | OUTPATIENT
Start: 2025-01-01 | End: 2025-01-03 | Stop reason: HOSPADM

## 2025-01-01 RX ORDER — METOPROLOL TARTRATE 1 MG/ML
5 INJECTION, SOLUTION INTRAVENOUS ONCE
Status: DISCONTINUED | OUTPATIENT
Start: 2025-01-01 | End: 2025-01-01

## 2025-01-01 RX ORDER — OXYCODONE HYDROCHLORIDE 5 MG/1
5 TABLET ORAL EVERY 4 HOURS PRN
Status: DISCONTINUED | OUTPATIENT
Start: 2025-01-01 | End: 2025-01-02

## 2025-01-01 RX ORDER — ATORVASTATIN CALCIUM 10 MG/1
10 TABLET, FILM COATED ORAL NIGHTLY
Status: DISCONTINUED | OUTPATIENT
Start: 2025-01-01 | End: 2025-01-03 | Stop reason: HOSPADM

## 2025-01-01 RX ORDER — ACETAMINOPHEN 650 MG/1
650 SUPPOSITORY RECTAL EVERY 6 HOURS PRN
Status: DISCONTINUED | OUTPATIENT
Start: 2025-01-01 | End: 2025-01-03 | Stop reason: HOSPADM

## 2025-01-01 RX ORDER — AMIODARONE HYDROCHLORIDE 150 MG/3ML
150 INJECTION, SOLUTION INTRAVENOUS ONCE
Status: COMPLETED | OUTPATIENT
Start: 2025-01-01 | End: 2025-01-01

## 2025-01-01 RX ORDER — ACETAMINOPHEN 325 MG/1
650 TABLET ORAL EVERY 6 HOURS PRN
Status: DISCONTINUED | OUTPATIENT
Start: 2025-01-01 | End: 2025-01-03 | Stop reason: HOSPADM

## 2025-01-01 RX ORDER — ONDANSETRON 2 MG/ML
4 INJECTION INTRAMUSCULAR; INTRAVENOUS EVERY 6 HOURS PRN
Status: DISCONTINUED | OUTPATIENT
Start: 2025-01-01 | End: 2025-01-03 | Stop reason: HOSPADM

## 2025-01-01 RX ORDER — SODIUM CHLORIDE 0.9 % (FLUSH) 0.9 %
5-40 SYRINGE (ML) INJECTION EVERY 12 HOURS SCHEDULED
Status: DISCONTINUED | OUTPATIENT
Start: 2025-01-01 | End: 2025-01-03 | Stop reason: HOSPADM

## 2025-01-01 RX ORDER — POTASSIUM CHLORIDE 7.45 MG/ML
10 INJECTION INTRAVENOUS PRN
Status: DISCONTINUED | OUTPATIENT
Start: 2025-01-01 | End: 2025-01-03 | Stop reason: HOSPADM

## 2025-01-01 RX ORDER — ASPIRIN 81 MG/1
81 TABLET, CHEWABLE ORAL DAILY
Status: DISCONTINUED | OUTPATIENT
Start: 2025-01-01 | End: 2025-01-03 | Stop reason: HOSPADM

## 2025-01-01 RX ORDER — ROPINIROLE 1 MG/1
1 TABLET, FILM COATED ORAL 4 TIMES DAILY
Status: DISCONTINUED | OUTPATIENT
Start: 2025-01-01 | End: 2025-01-02

## 2025-01-01 RX ADMIN — ROPINIROLE HYDROCHLORIDE 1 MG: 1 TABLET, FILM COATED ORAL at 17:34

## 2025-01-01 RX ADMIN — AMIODARONE HYDROCHLORIDE 150 MG: 50 INJECTION, SOLUTION INTRAVENOUS at 07:59

## 2025-01-01 RX ADMIN — LOPERAMIDE HYDROCHLORIDE 2 MG: 2 CAPSULE ORAL at 18:08

## 2025-01-01 RX ADMIN — ATORVASTATIN CALCIUM 10 MG: 10 TABLET, FILM COATED ORAL at 20:34

## 2025-01-01 RX ADMIN — KETOROLAC TROMETHAMINE 10 MG: 15 INJECTION, SOLUTION INTRAMUSCULAR; INTRAVENOUS at 10:05

## 2025-01-01 RX ADMIN — ROPINIROLE HYDROCHLORIDE 0.5 MG: 0.5 TABLET, FILM COATED ORAL at 06:17

## 2025-01-01 RX ADMIN — LISINOPRIL 10 MG: 10 TABLET ORAL at 17:34

## 2025-01-01 RX ADMIN — ACETAMINOPHEN 650 MG: 325 TABLET ORAL at 13:39

## 2025-01-01 RX ADMIN — SODIUM CHLORIDE 500 ML: 9 INJECTION, SOLUTION INTRAVENOUS at 05:30

## 2025-01-01 RX ADMIN — ATENOLOL 50 MG: 50 TABLET ORAL at 17:34

## 2025-01-01 RX ADMIN — METHYLPREDNISOLONE SODIUM SUCCINATE 40 MG: 40 INJECTION INTRAMUSCULAR; INTRAVENOUS at 17:35

## 2025-01-01 RX ADMIN — WATER 10 ML: 1 INJECTION INTRAMUSCULAR; INTRAVENOUS; SUBCUTANEOUS at 17:35

## 2025-01-01 RX ADMIN — FAMOTIDINE 20 MG: 20 TABLET, FILM COATED ORAL at 17:34

## 2025-01-01 RX ADMIN — MORPHINE SULFATE 2 MG: 2 INJECTION, SOLUTION INTRAMUSCULAR; INTRAVENOUS at 05:16

## 2025-01-01 RX ADMIN — FUROSEMIDE 20 MG: 40 TABLET ORAL at 17:34

## 2025-01-01 RX ADMIN — SODIUM CHLORIDE, PRESERVATIVE FREE 10 ML: 5 INJECTION INTRAVENOUS at 20:34

## 2025-01-01 RX ADMIN — PANTOPRAZOLE SODIUM 40 MG: 40 TABLET, DELAYED RELEASE ORAL at 20:34

## 2025-01-01 RX ADMIN — ASPIRIN 81 MG: 81 TABLET, CHEWABLE ORAL at 18:08

## 2025-01-01 RX ADMIN — ENOXAPARIN SODIUM 40 MG: 100 INJECTION SUBCUTANEOUS at 12:50

## 2025-01-01 RX ADMIN — ACETAMINOPHEN 1000 MG: 500 TABLET ORAL at 06:42

## 2025-01-01 RX ADMIN — ROPINIROLE HYDROCHLORIDE 1 MG: 1 TABLET, FILM COATED ORAL at 12:15

## 2025-01-01 RX ADMIN — SODIUM CHLORIDE 1000 ML: 9 INJECTION, SOLUTION INTRAVENOUS at 08:56

## 2025-01-01 RX ADMIN — SODIUM CHLORIDE 1000 ML: 9 INJECTION, SOLUTION INTRAVENOUS at 08:04

## 2025-01-01 RX ADMIN — CLOPIDOGREL BISULFATE 75 MG: 75 TABLET ORAL at 17:34

## 2025-01-01 RX ADMIN — ROPINIROLE HYDROCHLORIDE 1 MG: 1 TABLET, FILM COATED ORAL at 20:34

## 2025-01-01 ASSESSMENT — PAIN SCALES - GENERAL
PAINLEVEL_OUTOF10: 9
PAINLEVEL_OUTOF10: 10
PAINLEVEL_OUTOF10: 10
PAINLEVEL_OUTOF10: 0
PAINLEVEL_OUTOF10: 10
PAINLEVEL_OUTOF10: 10
PAINLEVEL_OUTOF10: 7

## 2025-01-01 ASSESSMENT — PAIN DESCRIPTION - LOCATION
LOCATION: GENERALIZED
LOCATION: ARM

## 2025-01-01 ASSESSMENT — PAIN DESCRIPTION - DESCRIPTORS: DESCRIPTORS: ACHING

## 2025-01-01 ASSESSMENT — LIFESTYLE VARIABLES
HOW OFTEN DO YOU HAVE A DRINK CONTAINING ALCOHOL: NEVER
HOW MANY STANDARD DRINKS CONTAINING ALCOHOL DO YOU HAVE ON A TYPICAL DAY: PATIENT DOES NOT DRINK

## 2025-01-01 ASSESSMENT — PAIN DESCRIPTION - ORIENTATION: ORIENTATION: RIGHT

## 2025-01-01 NOTE — H&P
Name:  Vlad Carrilol /Age/Sex: 1940  (84 y.o. female)   MRN & CSN:  [id number] & [id number] Encounter Date/Time: 2025 3:15 PM EST   Location:  [id number] PCP: Eliud Leach MD     Attending:Salty Gomez MD       Vlad Carrillo is a 84 y.o. female who presented with     Chief Complaint   Patient presents with    Pain    Medication Reaction     States began taking ciprofloxacin about a week ago, not sure for what and states since has been having an immense amount of generalized joint pain; states already has restless leg syndrome and it is making it worse; pt is seen moaning in pain and very restless in bed.    Addendum: initial triage note is incorrect and she had been on TMP/SMX and cephalexin recently.          Assessment and Plan     Adverse drug reaction   Patient presented to the ER with chief complaints of polyarthralgia and lower extremity skin changes after starting cephalexin   Likely drug induced polyarthralgia and rash   Antibiotics on hold currently   CRP levels elevated   Ordered PIPPA and anti-histone antibodies to check for drug induced lupus   Started on IV steroids and NSAIDS     Paroxysmal atrial fibrillation   Currently in atrial fibrillation   S/p watchman device   Heart rate under control   Resumed home medications    CAD s/p PCI   Continue ASA and Plavix    Restless leg syndrome   Resume home medications    DVT prophylaxis   Lovenox'    Medications:   Medications:    lidocaine  3 patch TransDERmal Once    sodium chloride flush  5-40 mL IntraVENous 2 times per day    enoxaparin  40 mg SubCUTAneous Daily    rOPINIRole  1 mg Oral 4x Daily    aspirin  81 mg Oral Daily    atenolol  50 mg Oral Daily    atorvastatin  10 mg Oral Nightly    clopidogrel  75 mg Oral Daily    furosemide  20 mg Oral Daily    lisinopril  10 mg Oral Daily    pantoprazole  40 mg Oral Nightly    methylPREDNISolone  40 mg IntraVENous Daily    famotidine  20 mg Oral Daily      Infusions:

## 2025-01-01 NOTE — ED NOTES
Pt's call light answered; sts needs pulled up to bed: pt assisted with repositioning, call light within reach, bed wheels locked and at lowest position with bilateral side rails up with for safety, pt has no questions or any needs at this time.

## 2025-01-01 NOTE — ED PROVIDER NOTES
Care transferred to me from Dr. Goins    84yrs F recently found to have UTI and cellulitis of her RLE  She was prescribed courses of cephalexin and TMP/SMX on 12/26/2024  She also takes lisinopril on a daily basis  For the last 24-36 hours she has been experiencing diffuse, intense, migratory muscle and joint pains  During this same time she has been feeling fairly fatigued and her appetite has been very poor  Daughter notes that Ms. Carrillo has been eating and drinking very little  Shortly after she began ABX she was struggling with diarrhea; this seems to have subsided over time  Family spoke with PCP yesterday and were advised to discontinue ABX  Pain has not improved since then so Ms. Carrillo's daughter brought her here this morning to be evaluated    On exam:  Awake, alert, chronically ill-appearing, looks frail  CTAB, tachycardic with irregularly irregular rhythm  Systolic murmur present, 2+ radial pulses  Abdomen soft, NT, ND  Circled area of recent cellulitis to distal, medial R lower leg seems to have normalized    07:47: BP declined to 80-90s systolic / 50s-60s diastolic on 4 successive BP checks    Results for orders placed or performed during the hospital encounter of 01/01/25   CBC with Auto Differential   Result Value Ref Range    WBC 9.0 4.0 - 11.0 K/uL    RBC 3.71 (L) 4.00 - 5.20 M/uL    Hemoglobin 11.2 (L) 12.0 - 16.0 g/dL    Hematocrit 33.8 (L) 36.0 - 48.0 %    MCV 91.1 80.0 - 100.0 fL    MCH 30.2 26.0 - 34.0 pg    MCHC 33.2 31.0 - 36.0 g/dL    RDW 14.4 12.4 - 15.4 %    Platelets 179 135 - 450 K/uL    MPV 8.1 5.0 - 10.5 fL    Neutrophils % 74.1 %    Lymphocytes % 11.5 %    Monocytes % 12.0 %    Eosinophils % 2.0 %    Basophils % 0.4 %    Neutrophils Absolute 6.6 1.7 - 7.7 K/uL    Lymphocytes Absolute 1.0 1.0 - 5.1 K/uL    Monocytes Absolute 1.1 0.0 - 1.3 K/uL    Eosinophils Absolute 0.2 0.0 - 0.6 K/uL    Basophils Absolute 0.0 0.0 - 0.2 K/uL   Comprehensive Metabolic Panel   Result Value Ref

## 2025-01-01 NOTE — ED PROVIDER NOTES
[facility] EMERGENCY DEPARTMENT      EMERGENCY MEDICINE     Pt Name: Vlad Carrillo  MRN: [id number]  Birthdate 1940  Date of evaluation: 1/1/2025  Provider: Isidro Goins DO    CHIEF COMPLAINT       Chief Complaint   Patient presents with    Pain    Medication Reaction     States began taking ciprofloxacin about a week ago, not sure for what and states since has been having an immense amount of generalized joint pain; states already has restless leg syndrome and it is making it worse; pt is seen moaning in pain and very restless in bed     HISTORY OF PRESENT ILLNESS   Vlad Carrillo is a 84 y.o. female who presents to the emergency department for diffuse generalized joint pain.  Patient also states she has a history of restless legs.  She reports that she had recently been given a prescription for ciprofloxacin but she is unsure what for.  She states that she is urinating frequently so it is possible it is for a UTI.  She states she was told that this may cause joint pain which she has been experiencing.  She denies any chest pain or shortness of breath.  No abdominal pain nausea or vomiting.  She is not febrile but is noted to be tachycardic and in atrial fibrillation.  She is anticoagulated with Eliquis and had a recent Watchman placement.  She states that the pain is in all of her joints primarily her shoulders knees and ankles.        PASTMEDICAL HISTORY     Past Medical History:   Diagnosis Date    Acid reflux     Arthritis     Atrial fibrillation (HCC)     CAD (coronary artery disease)     Diarrhea     Dysphagia 03/2023    \"my throat has problems closing up.\"    Fatty liver     Hyperlipidemia     Hypertension     NSTEMI (non-ST elevated myocardial infarction) (HCC)     stent placed 2/19/24    Overactive bladder     Prolonged emergence from general anesthesia     FAMILY HX-BROTHER-AFTER HEART SURGERY    Restless leg     Stomach ulcer     TIA (transient ischemic attack)

## 2025-01-02 LAB
ANA SER QL IA: NEGATIVE
ANION GAP SERPL CALCULATED.3IONS-SCNC: 11 MMOL/L (ref 3–16)
BACTERIA URNS QL MICRO: ABNORMAL /HPF
BASOPHILS # BLD: 0 K/UL (ref 0–0.2)
BASOPHILS NFR BLD: 0.1 %
BILIRUB UR QL STRIP.AUTO: NEGATIVE
BUN SERPL-MCNC: 22 MG/DL (ref 7–20)
CALCIUM SERPL-MCNC: 9.1 MG/DL (ref 8.3–10.6)
CHLORIDE SERPL-SCNC: 107 MMOL/L (ref 99–110)
CLARITY UR: CLEAR
CO2 SERPL-SCNC: 18 MMOL/L (ref 21–32)
COLOR UR: YELLOW
CREAT SERPL-MCNC: 0.7 MG/DL (ref 0.6–1.2)
CRP SERPL-MCNC: 184 MG/L (ref 0–5.1)
DEPRECATED RDW RBC AUTO: 14.6 % (ref 12.4–15.4)
EOSINOPHIL # BLD: 0 K/UL (ref 0–0.6)
EOSINOPHIL NFR BLD: 0 %
EPI CELLS #/AREA URNS AUTO: 2 /HPF (ref 0–5)
ERYTHROCYTE [SEDIMENTATION RATE] IN BLOOD BY WESTERGREN METHOD: 39 MM/HR (ref 0–30)
GFR SERPLBLD CREATININE-BSD FMLA CKD-EPI: 86 ML/MIN/{1.73_M2}
GLUCOSE SERPL-MCNC: 134 MG/DL (ref 70–99)
GLUCOSE UR STRIP.AUTO-MCNC: NEGATIVE MG/DL
HCT VFR BLD AUTO: 32.3 % (ref 36–48)
HGB BLD-MCNC: 10.7 G/DL (ref 12–16)
HGB UR QL STRIP.AUTO: NEGATIVE
HYALINE CASTS #/AREA URNS AUTO: 4 /LPF (ref 0–8)
KETONES UR STRIP.AUTO-MCNC: NEGATIVE MG/DL
LEUKOCYTE ESTERASE UR QL STRIP.AUTO: ABNORMAL
LYMPHOCYTES # BLD: 0.8 K/UL (ref 1–5.1)
LYMPHOCYTES NFR BLD: 10.7 %
MCH RBC QN AUTO: 30.3 PG (ref 26–34)
MCHC RBC AUTO-ENTMCNC: 33 G/DL (ref 31–36)
MCV RBC AUTO: 91.8 FL (ref 80–100)
MONOCYTES # BLD: 0.6 K/UL (ref 0–1.3)
MONOCYTES NFR BLD: 7.4 %
NEUTROPHILS # BLD: 6.2 K/UL (ref 1.7–7.7)
NEUTROPHILS NFR BLD: 81.8 %
NITRITE UR QL STRIP.AUTO: NEGATIVE
PH UR STRIP.AUTO: 5.5 [PH] (ref 5–8)
PLATELET # BLD AUTO: 191 K/UL (ref 135–450)
PMV BLD AUTO: 8.5 FL (ref 5–10.5)
POTASSIUM SERPL-SCNC: 4.7 MMOL/L (ref 3.5–5.1)
PROT UR STRIP.AUTO-MCNC: ABNORMAL MG/DL
RBC # BLD AUTO: 3.52 M/UL (ref 4–5.2)
RBC CLUMPS #/AREA URNS AUTO: 3 /HPF (ref 0–4)
RHEUMATOID FACT SER IA-ACNC: 13.9 IU/ML
SODIUM SERPL-SCNC: 136 MMOL/L (ref 136–145)
SP GR UR STRIP.AUTO: 1.02 (ref 1–1.03)
UA COMPLETE W REFLEX CULTURE PNL UR: YES
UA DIPSTICK W REFLEX MICRO PNL UR: YES
URN SPEC COLLECT METH UR: ABNORMAL
UROBILINOGEN UR STRIP-ACNC: 0.2 E.U./DL
WBC # BLD AUTO: 7.6 K/UL (ref 4–11)
WBC #/AREA URNS AUTO: 15 /HPF (ref 0–5)

## 2025-01-02 PROCEDURE — 87086 URINE CULTURE/COLONY COUNT: CPT

## 2025-01-02 PROCEDURE — 85025 COMPLETE CBC W/AUTO DIFF WBC: CPT

## 2025-01-02 PROCEDURE — 97535 SELF CARE MNGMENT TRAINING: CPT

## 2025-01-02 PROCEDURE — 6370000000 HC RX 637 (ALT 250 FOR IP): Performed by: NURSE PRACTITIONER

## 2025-01-02 PROCEDURE — 85652 RBC SED RATE AUTOMATED: CPT

## 2025-01-02 PROCEDURE — 97116 GAIT TRAINING THERAPY: CPT

## 2025-01-02 PROCEDURE — 97530 THERAPEUTIC ACTIVITIES: CPT

## 2025-01-02 PROCEDURE — 1200000000 HC SEMI PRIVATE

## 2025-01-02 PROCEDURE — 86140 C-REACTIVE PROTEIN: CPT

## 2025-01-02 PROCEDURE — 97162 PT EVAL MOD COMPLEX 30 MIN: CPT

## 2025-01-02 PROCEDURE — 80048 BASIC METABOLIC PNL TOTAL CA: CPT

## 2025-01-02 PROCEDURE — 81001 URINALYSIS AUTO W/SCOPE: CPT

## 2025-01-02 PROCEDURE — 2500000003 HC RX 250 WO HCPCS

## 2025-01-02 PROCEDURE — 36415 COLL VENOUS BLD VENIPUNCTURE: CPT

## 2025-01-02 PROCEDURE — 86431 RHEUMATOID FACTOR QUANT: CPT

## 2025-01-02 PROCEDURE — 6370000000 HC RX 637 (ALT 250 FOR IP)

## 2025-01-02 PROCEDURE — 97166 OT EVAL MOD COMPLEX 45 MIN: CPT

## 2025-01-02 PROCEDURE — 94760 N-INVAS EAR/PLS OXIMETRY 1: CPT

## 2025-01-02 PROCEDURE — 6360000002 HC RX W HCPCS

## 2025-01-02 RX ORDER — HYDROCODONE BITARTRATE AND ACETAMINOPHEN 5; 325 MG/1; MG/1
1 TABLET ORAL EVERY 6 HOURS PRN
Status: DISCONTINUED | OUTPATIENT
Start: 2025-01-02 | End: 2025-01-03 | Stop reason: HOSPADM

## 2025-01-02 RX ORDER — POTASSIUM CHLORIDE 1500 MG/1
20 TABLET, EXTENDED RELEASE ORAL 2 TIMES DAILY
COMMUNITY
Start: 2024-11-08

## 2025-01-02 RX ORDER — SENNOSIDES 8.6 MG
650 CAPSULE ORAL EVERY 8 HOURS PRN
COMMUNITY

## 2025-01-02 RX ORDER — LOPERAMIDE HYDROCHLORIDE 2 MG/1
2 CAPSULE ORAL 4 TIMES DAILY PRN
COMMUNITY

## 2025-01-02 RX ORDER — ROPINIROLE 2 MG/1
2 TABLET, FILM COATED ORAL NIGHTLY PRN
COMMUNITY

## 2025-01-02 RX ORDER — WATER 10 ML/10ML
INJECTION INTRAMUSCULAR; INTRAVENOUS; SUBCUTANEOUS
Status: COMPLETED
Start: 2025-01-02 | End: 2025-01-02

## 2025-01-02 RX ORDER — ROPINIROLE 1 MG/1
2 TABLET, FILM COATED ORAL 4 TIMES DAILY
Status: DISCONTINUED | OUTPATIENT
Start: 2025-01-02 | End: 2025-01-03 | Stop reason: HOSPADM

## 2025-01-02 RX ORDER — ROPINIROLE 0.5 MG/1
0.5 TABLET, FILM COATED ORAL ONCE
Status: COMPLETED | OUTPATIENT
Start: 2025-01-02 | End: 2025-01-02

## 2025-01-02 RX ADMIN — WATER 10 ML: 1 INJECTION INTRAMUSCULAR; INTRAVENOUS; SUBCUTANEOUS at 09:20

## 2025-01-02 RX ADMIN — ENOXAPARIN SODIUM 40 MG: 100 INJECTION SUBCUTANEOUS at 09:20

## 2025-01-02 RX ADMIN — HYDROCODONE BITARTRATE AND ACETAMINOPHEN 1 TABLET: 5; 325 TABLET ORAL at 18:29

## 2025-01-02 RX ADMIN — LISINOPRIL 10 MG: 10 TABLET ORAL at 09:20

## 2025-01-02 RX ADMIN — ROPINIROLE HYDROCHLORIDE 2 MG: 1 TABLET, FILM COATED ORAL at 18:29

## 2025-01-02 RX ADMIN — SODIUM CHLORIDE, PRESERVATIVE FREE 10 ML: 5 INJECTION INTRAVENOUS at 10:29

## 2025-01-02 RX ADMIN — FUROSEMIDE 20 MG: 40 TABLET ORAL at 09:20

## 2025-01-02 RX ADMIN — ROPINIROLE HYDROCHLORIDE 2 MG: 1 TABLET, FILM COATED ORAL at 12:56

## 2025-01-02 RX ADMIN — SODIUM CHLORIDE, PRESERVATIVE FREE 10 ML: 5 INJECTION INTRAVENOUS at 20:18

## 2025-01-02 RX ADMIN — METHYLPREDNISOLONE SODIUM SUCCINATE 40 MG: 40 INJECTION INTRAMUSCULAR; INTRAVENOUS at 09:20

## 2025-01-02 RX ADMIN — CLOPIDOGREL BISULFATE 75 MG: 75 TABLET ORAL at 09:20

## 2025-01-02 RX ADMIN — ROPINIROLE HYDROCHLORIDE 0.5 MG: 0.5 TABLET, FILM COATED ORAL at 03:50

## 2025-01-02 RX ADMIN — PANTOPRAZOLE SODIUM 40 MG: 40 TABLET, DELAYED RELEASE ORAL at 20:17

## 2025-01-02 RX ADMIN — ROPINIROLE HYDROCHLORIDE 2 MG: 1 TABLET, FILM COATED ORAL at 20:17

## 2025-01-02 RX ADMIN — ATENOLOL 50 MG: 50 TABLET ORAL at 09:20

## 2025-01-02 RX ADMIN — FAMOTIDINE 20 MG: 20 TABLET, FILM COATED ORAL at 09:20

## 2025-01-02 RX ADMIN — ASPIRIN 81 MG: 81 TABLET, CHEWABLE ORAL at 09:20

## 2025-01-02 RX ADMIN — ACETAMINOPHEN 650 MG: 325 TABLET ORAL at 12:56

## 2025-01-02 RX ADMIN — ATORVASTATIN CALCIUM 10 MG: 10 TABLET, FILM COATED ORAL at 20:17

## 2025-01-02 RX ADMIN — HYDROCODONE BITARTRATE AND ACETAMINOPHEN 1 TABLET: 5; 325 TABLET ORAL at 09:20

## 2025-01-02 ASSESSMENT — PAIN SCALES - GENERAL
PAINLEVEL_OUTOF10: 0
PAINLEVEL_OUTOF10: 9
PAINLEVEL_OUTOF10: 3
PAINLEVEL_OUTOF10: 8
PAINLEVEL_OUTOF10: 6
PAINLEVEL_OUTOF10: 0
PAINLEVEL_OUTOF10: 3

## 2025-01-02 ASSESSMENT — PAIN DESCRIPTION - ORIENTATION
ORIENTATION: LOWER;MID;UPPER
ORIENTATION: RIGHT
ORIENTATION: RIGHT;LEFT

## 2025-01-02 ASSESSMENT — PAIN - FUNCTIONAL ASSESSMENT
PAIN_FUNCTIONAL_ASSESSMENT: ACTIVITIES ARE NOT PREVENTED
PAIN_FUNCTIONAL_ASSESSMENT: ACTIVITIES ARE NOT PREVENTED
PAIN_FUNCTIONAL_ASSESSMENT: PREVENTS OR INTERFERES SOME ACTIVE ACTIVITIES AND ADLS

## 2025-01-02 ASSESSMENT — PAIN DESCRIPTION - DESCRIPTORS
DESCRIPTORS: ACHING;DULL
DESCRIPTORS: STABBING;SHARP;SHOOTING
DESCRIPTORS: ACHING

## 2025-01-02 ASSESSMENT — PAIN DESCRIPTION - LOCATION
LOCATION: FINGER (COMMENT WHICH ONE);HAND
LOCATION: ARM;GENERALIZED
LOCATION: BACK

## 2025-01-02 ASSESSMENT — PAIN SCALES - WONG BAKER
WONGBAKER_NUMERICALRESPONSE: NO HURT

## 2025-01-02 NOTE — PLAN OF CARE
Problem: Discharge Planning  Goal: Discharge to home or other facility with appropriate resources  1/2/2025 0121 by Elie Alicia, RN  Outcome: Progressing  Problem: Pain  Goal: Verbalizes/displays adequate comfort level or baseline comfort level  1/2/2025 0121 by Elie Alicia, RN  Outcome: Progressing  Flowsheets  Taken 1/1/2025 2330  Verbalizes/displays adequate comfort level or baseline comfort level: Assess pain using appropriate pain scale  Taken 1/1/2025 1956  Verbalizes/displays adequate comfort level or baseline comfort level: Assess pain using appropriate pain scale     Problem: Skin/Tissue Integrity  Goal: Absence of new skin breakdown  Description: 1.  Monitor for areas of redness and/or skin breakdown  2.  Assess vascular access sites hourly  3.  Every 4-6 hours minimum:  Change oxygen saturation probe site  4.  Every 4-6 hours:  If on nasal continuous positive airway pressure, respiratory therapy assess nares and determine need for appliance change or resting period.  1/2/2025 0121 by Elie Alicia, RN  Outcome: Progressing     Problem: Safety - Adult  Goal: Free from fall injury  Outcome: Progressing

## 2025-01-02 NOTE — PLAN OF CARE
Problem: Discharge Planning  Goal: Discharge to home or other facility with appropriate resources  1/2/2025 0854 by Anika Shultz RN  Outcome: Progressing  1/2/2025 0121 by Elie Alicia RN  Outcome: Progressing  Flowsheets (Taken 1/1/2025 2024)  Discharge to home or other facility with appropriate resources: Identify barriers to discharge with patient and caregiver     Problem: Pain  Goal: Verbalizes/displays adequate comfort level or baseline comfort level  1/2/2025 0854 by Anika Shultz RN  Outcome: Progressing  Flowsheets (Taken 1/2/2025 0315 by Elie Alicia, RN)  Verbalizes/displays adequate comfort level or baseline comfort level: Assess pain using appropriate pain scale  1/2/2025 0121 by Elie Alicia RN  Outcome: Progressing  Flowsheets  Taken 1/1/2025 2330  Verbalizes/displays adequate comfort level or baseline comfort level: Assess pain using appropriate pain scale  Taken 1/1/2025 1956  Verbalizes/displays adequate comfort level or baseline comfort level: Assess pain using appropriate pain scale     Problem: Skin/Tissue Integrity  Goal: Absence of new skin breakdown  Description: 1.  Monitor for areas of redness and/or skin breakdown  2.  Assess vascular access sites hourly  3.  Every 4-6 hours minimum:  Change oxygen saturation probe site  4.  Every 4-6 hours:  If on nasal continuous positive airway pressure, respiratory therapy assess nares and determine need for appliance change or resting period.  1/2/2025 0854 by Anika Shultz RN  Outcome: Progressing  1/2/2025 0121 by Elie Alicia RN  Outcome: Progressing     Problem: Safety - Adult  Goal: Free from fall injury  Outcome: Progressing  Flowsheets (Taken 1/1/2025 2024 by Elie Alicia RN)  Free From Fall Injury: Instruct family/caregiver on patient safety

## 2025-01-03 VITALS
BODY MASS INDEX: 25.37 KG/M2 | HEIGHT: 66 IN | WEIGHT: 157.85 LBS | SYSTOLIC BLOOD PRESSURE: 151 MMHG | TEMPERATURE: 97.6 F | OXYGEN SATURATION: 98 % | HEART RATE: 66 BPM | RESPIRATION RATE: 16 BRPM | DIASTOLIC BLOOD PRESSURE: 78 MMHG

## 2025-01-03 LAB
ANION GAP SERPL CALCULATED.3IONS-SCNC: 12 MMOL/L (ref 3–16)
BACTERIA UR CULT: NORMAL
BASOPHILS # BLD: 0 K/UL (ref 0–0.2)
BASOPHILS NFR BLD: 0.1 %
BUN SERPL-MCNC: 24 MG/DL (ref 7–20)
CALCIUM SERPL-MCNC: 9.3 MG/DL (ref 8.3–10.6)
CHLORIDE SERPL-SCNC: 108 MMOL/L (ref 99–110)
CK SERPL-CCNC: 57 U/L (ref 26–192)
CO2 SERPL-SCNC: 20 MMOL/L (ref 21–32)
CREAT SERPL-MCNC: 0.6 MG/DL (ref 0.6–1.2)
DEPRECATED RDW RBC AUTO: 14.4 % (ref 12.4–15.4)
EOSINOPHIL # BLD: 0 K/UL (ref 0–0.6)
EOSINOPHIL NFR BLD: 0 %
GFR SERPLBLD CREATININE-BSD FMLA CKD-EPI: 89 ML/MIN/{1.73_M2}
GLUCOSE SERPL-MCNC: 119 MG/DL (ref 70–99)
HCT VFR BLD AUTO: 30.5 % (ref 36–48)
HGB BLD-MCNC: 10.5 G/DL (ref 12–16)
LYMPHOCYTES # BLD: 1 K/UL (ref 1–5.1)
LYMPHOCYTES NFR BLD: 15.5 %
MCH RBC QN AUTO: 30.9 PG (ref 26–34)
MCHC RBC AUTO-ENTMCNC: 34.3 G/DL (ref 31–36)
MCV RBC AUTO: 90.1 FL (ref 80–100)
MONOCYTES # BLD: 0.6 K/UL (ref 0–1.3)
MONOCYTES NFR BLD: 8.9 %
NEUTROPHILS # BLD: 5.1 K/UL (ref 1.7–7.7)
NEUTROPHILS NFR BLD: 75.5 %
PLATELET # BLD AUTO: 196 K/UL (ref 135–450)
PMV BLD AUTO: 8.7 FL (ref 5–10.5)
POTASSIUM SERPL-SCNC: 4.7 MMOL/L (ref 3.5–5.1)
RBC # BLD AUTO: 3.38 M/UL (ref 4–5.2)
SODIUM SERPL-SCNC: 140 MMOL/L (ref 136–145)
WBC # BLD AUTO: 6.7 K/UL (ref 4–11)

## 2025-01-03 PROCEDURE — 97530 THERAPEUTIC ACTIVITIES: CPT

## 2025-01-03 PROCEDURE — 6360000002 HC RX W HCPCS

## 2025-01-03 PROCEDURE — 97116 GAIT TRAINING THERAPY: CPT

## 2025-01-03 PROCEDURE — 97535 SELF CARE MNGMENT TRAINING: CPT

## 2025-01-03 PROCEDURE — 97110 THERAPEUTIC EXERCISES: CPT

## 2025-01-03 PROCEDURE — 6370000000 HC RX 637 (ALT 250 FOR IP)

## 2025-01-03 PROCEDURE — 80048 BASIC METABOLIC PNL TOTAL CA: CPT

## 2025-01-03 PROCEDURE — 94760 N-INVAS EAR/PLS OXIMETRY 1: CPT

## 2025-01-03 PROCEDURE — 85025 COMPLETE CBC W/AUTO DIFF WBC: CPT

## 2025-01-03 PROCEDURE — 36415 COLL VENOUS BLD VENIPUNCTURE: CPT

## 2025-01-03 PROCEDURE — 82550 ASSAY OF CK (CPK): CPT

## 2025-01-03 RX ORDER — IBUPROFEN 200 MG
200 TABLET ORAL EVERY 8 HOURS PRN
Qty: 20 TABLET | Refills: 0 | Status: SHIPPED | OUTPATIENT
Start: 2025-01-03

## 2025-01-03 RX ORDER — PREDNISONE 10 MG/1
10 TABLET ORAL DAILY
Qty: 5 TABLET | Refills: 0 | Status: SHIPPED | OUTPATIENT
Start: 2025-01-03 | End: 2025-01-08

## 2025-01-03 RX ORDER — NITROFURANTOIN 25; 75 MG/1; MG/1
100 CAPSULE ORAL 2 TIMES DAILY
Qty: 10 CAPSULE | Refills: 0 | Status: SHIPPED | OUTPATIENT
Start: 2025-01-03 | End: 2025-01-08

## 2025-01-03 RX ADMIN — METHYLPREDNISOLONE SODIUM SUCCINATE 40 MG: 40 INJECTION INTRAMUSCULAR; INTRAVENOUS at 08:38

## 2025-01-03 RX ADMIN — ATENOLOL 50 MG: 50 TABLET ORAL at 08:34

## 2025-01-03 RX ADMIN — CLOPIDOGREL BISULFATE 75 MG: 75 TABLET ORAL at 08:34

## 2025-01-03 RX ADMIN — ROPINIROLE HYDROCHLORIDE 2 MG: 1 TABLET, FILM COATED ORAL at 08:34

## 2025-01-03 RX ADMIN — LISINOPRIL 10 MG: 10 TABLET ORAL at 08:34

## 2025-01-03 RX ADMIN — FAMOTIDINE 20 MG: 20 TABLET, FILM COATED ORAL at 08:34

## 2025-01-03 RX ADMIN — ASPIRIN 81 MG: 81 TABLET, CHEWABLE ORAL at 08:34

## 2025-01-03 RX ADMIN — LOPERAMIDE HYDROCHLORIDE 2 MG: 2 CAPSULE ORAL at 03:45

## 2025-01-03 RX ADMIN — FUROSEMIDE 20 MG: 40 TABLET ORAL at 08:34

## 2025-01-03 NOTE — ACP (ADVANCE CARE PLANNING)
Advance Care Planning   Healthcare Decision Maker:    Primary Decision Maker: Marie Richter - Child - [phone]    Primary Decision Maker: perla verma - Child - [phone]    Today we documented Decision Maker(s) consistent with Legal Next of Kin hierarchy.     Electronically signed by THELMA CONWAY RN on 1/3/2025 at 2:14 PM

## 2025-01-03 NOTE — CARE COORDINATION
[R52]  Hypotension, unspecified hypotension type [I95.9]    IF APPLICABLE: The Patient and/or patient representative Vlad and her family were provided with a choice of provider and agrees with the discharge plan. Freedom of choice list with basic dialogue that supports the patient's individualized plan of care/goals and shares the quality data associated with the providers was provided to: Patient   Patient Representative Name:       The Patient and/or Patient Representative Agree with the Discharge Plan? Yes    THEMLA CONWAY RN  Case Management Department  Ph: [phone]

## 2025-01-03 NOTE — DISCHARGE INSTR - COC
Continuity of Care Form    Patient Name: Vlad Verma   :  1940  MRN:  [id number]    Admit date:  2025  Discharge date:  ***    Code Status Order: Full Code   Advance Directives:   Advance Care Flowsheet Documentation             Admitting Physician:  Salty Gomez MD  PCP: Eliud Leach MD    Discharging Nurse: ***  Discharging Hospital Unit/Room#: D5H-9552/5114-01  Discharging Unit Phone Number: ***    Emergency Contact:   Extended Emergency Contact Information  Primary Emergency Contact: Marie Richter   [facility]  Home Phone: [phone]  Relation: Child  Secondary Emergency Contact: perla verma  Home Phone: [phone]  Relation: Child    Past Surgical History:  Past Surgical History:   Procedure Laterality Date    CAROTID ENDARTERECTOMY Right 1995    CAROTID ENDARTERECTOMY Left     CHOLECYSTECTOMY      COLONOSCOPY      CORONARY ANGIOPLASTY WITH STENT PLACEMENT      CORONARY ANGIOPLASTY WITH STENT PLACEMENT  2024    DILATION AND CURETTAGE OF UTERUS N/A 2019    HYSTEROSCOPY DILATION AND CURETTAGE POLYP REMOVAL performed by Zahira Wilkes MD at [facility] OR    EYE SURGERY Bilateral     cataract removal with lens implant    LEFT ATRIAL APPENDAGE OCCLUDER DEVICE      LIPOMA RESECTION      LIPOMA REMOVAL SHOULDER    UPPER GASTROINTESTINAL ENDOSCOPY N/A 02/10/2023    ESOPHAGOGASTRODUODENOSCOPY WITH BALLOON DILATION OF ESOPHAGUS performed by Alex Miranda MD at [facility] ENDOSCOPY    UPPER GASTROINTESTINAL ENDOSCOPY N/A 2023    EGD DILATION BALLOON performed by Alex Miranda MD at [facility] ENDOSCOPY    UPPER GASTROINTESTINAL ENDOSCOPY N/A 2023    ESOPHAGOGASTRODUODENOSCOPY WITH DILATION performed by Alex iMranda MD at [facility] ENDOSCOPY    UPPER GASTROINTESTINAL ENDOSCOPY N/A 2024    ESOPHAGOGASTRODUODENOSCOPY FOREIGN BODY REMOVAL performed by Harjit Blanco DO at [facility] ENDOSCOPY    UPPER GASTROINTESTINAL ENDOSCOPY N/A

## 2025-01-03 NOTE — PLAN OF CARE
Problem: Discharge Planning  Goal: Discharge to home or other facility with appropriate resources  1/3/2025 0923 by Sherri Cho, RN  Outcome: Progressing     Problem: Pain  Goal: Verbalizes/displays adequate comfort level or baseline comfort level  1/3/2025 0923 by Sherri Cho, RN  Outcome: Progressing     Problem: Skin/Tissue Integrity  Goal: Absence of new skin breakdown  Description: 1.  Monitor for areas of redness and/or skin breakdown  2.  Assess vascular access sites hourly  3.  Every 4-6 hours minimum:  Change oxygen saturation probe site  4.  Every 4-6 hours:  If on nasal continuous positive airway pressure, respiratory therapy assess nares and determine need for appliance change or resting period.  1/3/2025 0923 by Sherri Cho, RN  Outcome: Progressing     Problem: Safety - Adult  Goal: Free from fall injury  1/3/2025 0923 by Sherri Cho, RN  Outcome: Progressing

## 2025-01-03 NOTE — DISCHARGE SUMMARY
TSH: No results found for: \"TSH\"  Troponin: No results found for: \"TROPONINT\"  Lactic Acid: No results for input(s): \"LACTA\" in the last 72 hours.  BNP: No results for input(s): \"PROBNP\" in the last 72 hours.  UA:  Lab Results   Component Value Date/Time    NITRU Negative 01/02/2025 05:40 PM    COLORU Yellow 01/02/2025 05:40 PM    PHUR 5.5 01/02/2025 05:40 PM    PHUR 5.5 04/13/2024 08:47 PM    PHUR 5.0 11/21/2023 09:51 AM    WBCUA 15 01/02/2025 05:40 PM    RBCUA 3 01/02/2025 05:40 PM    BACTERIA None Seen 01/02/2025 05:40 PM    CLARITYU Clear 01/02/2025 05:40 PM    SPECGRAV 1.030 04/13/2024 08:47 PM    LEUKOCYTESUR SMALL 01/02/2025 05:40 PM    UROBILINOGEN 0.2 01/02/2025 05:40 PM    BILIRUBINUR Negative 01/02/2025 05:40 PM    BLOODU Negative 01/02/2025 05:40 PM    GLUCOSEU Negative 01/02/2025 05:40 PM    KETUA Negative 01/02/2025 05:40 PM     Urine Cultures:   Lab Results   Component Value Date/Time    LABURIN 50,000 CFU/ml 04/13/2024 08:47 PM     Blood Cultures: No results found for: \"BC\"  No results found for: \"BLOODCULT2\"  Organism:   Lab Results   Component Value Date/Time    ORG Klebsiella variicola 04/13/2024 08:47 PM       Time Spent on Discharge:  40 minutes were spent in patient examination, evaluation, counseling as well as medication reconciliation, prescriptions for required medications, discharge plan and follow up    Salty Gomez MD\

## 2025-01-03 NOTE — DISCHARGE INSTRUCTIONS
Follow up with your PCP and get an appointment with rheumatologist if your joint pain worsens  If you still continue to have muscle pains stop taking atorvastatin and check if it improves and follow up with your primary doctor  Take Advil as needed for pain for your arthritis  Take nitrofurantoin for your UTI

## 2025-01-03 NOTE — PROGRESS NOTES
Name:  Vlad Carrillo /Age/Sex: 1940  (84 y.o. female)   MRN & CSN:  [id number] & [id number] Encounter Date/Time: 2025 12:58 PM EST   Location:  [id number] PCP: Eliud Leach MD     Attending:Salty Gomez MD       Vlad Carrillo is a 84 y.o. female with  has a past medical history of Acid reflux, Arthritis, Atrial fibrillation (HCC), CAD (coronary artery disease), GERD (gastroesophageal reflux disease), Hepatic steatosis, Hyperlipidemia, Hypertension, NSTEMI (non-ST elevated myocardial infarction) (HCC), Overactive bladder, Peptic ulcer disease, Restless leg syndrome, and TIA (transient ischemic attack). who presents with Polyarthralgia    Hospital Day: 2      Interval history:     Seen and examined at bedside. No acute events overnight. 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              Antibiotics on hold currently, UA and urine culture ordered              CRP levels elevated              Ordered PIPPA and anti-histone antibodies to check for drug induced lupus              Started on IV steroids and NSAIDS with much improvement   PT OT consulted                Paroxysmal atrial fibrillation              Currently in atrial fibrillation              S/p watchman device              Heart rate under control              Resumed home medications     CAD s/p PCI              Continue ASA and Plavix     Restless leg syndrome              Resume home medications     DVT prophylaxis              Lovenox'    Review of Systems:      10 point ROS negative except stated above    Objective:       Intake/Output Summary (Last 24 hours) at 2025 1258  Last data filed at 2025 
4 Eyes Skin Assessment     NAME:  Vlad Carrillo  YOB: 1940  MEDICAL RECORD NUMBER:  [id number]    The patient is being assessed for  Admission    I agree that at least one RN has performed a thorough Head to Toe Skin Assessment on the patient. ALL assessment sites listed below have been assessed.      Areas assessed by both nurses:    Head, Face, Ears, Shoulders, Back, Chest, Arms, Elbows, Hands, Sacrum. Buttock, Coccyx, Ischium, Legs. Feet and Heels, and Under Medical Devices         Does the Patient have a Wound? No noted wound(s)       Jorge L Prevention initiated by RN: Yes  Wound Care Orders initiated by RN: No    Pressure Injury (Stage 3,4, Unstageable, DTI, NWPT, and Complex wounds) if present, place Wound referral order by RN under : No    New Ostomies, if present place, Ostomy referral order under : No     Nurse 1 eSignature: Electronically signed by Gareth Collins RN on 1/1/25 at 6:17 PM EST    **SHARE this note so that the co-signing nurse can place an eSignature**    Nurse 2 eSignature: Electronically signed by Elie Alicia RN on 1/2/25 at 6:42 AM EST   
CLINICAL PHARMACY NOTE: MEDS TO BEDS    Total # of Prescriptions Filled: 3   The following medications were delivered to the patient:  Current Discharge Medication List        START taking these medications    Details   ibuprofen (ADVIL) 200 MG tablet Take 1 tablet by mouth every 8 hours as needed for Pain  Qty: 20 tablet, Refills: 0      nitrofurantoin, macrocrystal-monohydrate, (MACROBID) 100 MG capsule Take 1 capsule by mouth 2 times daily for 5 days  Qty: 10 capsule, Refills: 0      predniSONE (DELTASONE) 10 MG tablet Take 1 tablet by mouth daily for 5 days  Qty: 5 tablet, Refills: 0               Additional Documentation: Picked up by daughter    
Clinical Pharmacy Note  Renal Dose Adjustment    Vlad Carrillo is receiving Famotidine.  This medication is renally eliminated.  Based on the patient's Estimated Creatinine Clearance: 40 mL/min (based on SCr of 1 mg/dL). and urine output, the dose has been adjusted to 20mg daily per protocol.    Pharmacy will continue to monitor and adjust dose as needed for changes in renal function.    Tomas Sullivan [facility], 1/1/2025 3:06 PM    
Clinical Pharmacy Note  Renal Dose Adjustment    Vlad Carrillo is receiving Ketorolac.  This medication is renally eliminated.  Based on the patient's Estimated Creatinine Clearance: 40 mL/min (based on SCr of 1 mg/dL). and urine output, the dose has been adjusted to 15mg q6h prn per protocol.    Pharmacy will continue to monitor and adjust dose as needed for changes in renal function.    Tomas Sullivan, [facility], 1/1/2025 3:08 PM    
Discharge reviewed with patient and daughter, all questions answered, daughter picked up meds from retail pharmacy and transporting home.     
Medication Reconciliation    List of medications patient is currently taking is complete.     Source of information: 1. Conversation with patient's daughter at bedside                                      2. EPIC records        Celine Fragoso RPH, PharmD, 1/2/2025 9:18 AM        
Occupational Therapy  Facility/Department: [address] PROGRESSIVE CARE  Occupational Therapy Daily Treatment Note    Name: Vlad Carrillo  : 1940  MRN: [id number]  Date of Service: 1/3/2025    Discharge Recommendations:  2-3 sessions per week, 24 hour supervision or assist, Patient would benefit from continued therapy after discharge, Home with Home health OT     Vlad Carrillo scored a 19/24 on the AM-PAC ADL Inpatient form. Current research shows that an AM-PAC score of 18 or greater is typically associated with a discharge to the patient's home setting. Based on the patient's AM-PAC score, and their current ADL deficits, it is recommended that the patient have 2-3 sessions per week of Occupational Therapy at d/c to increase the patient's independence.  At this time, this patient demonstrates the endurance and safety to discharge home with home OT (home vs OP services) and a follow up treatment frequency of 2-3x/wk.   Please see assessment section for further patient specific details.    If patient discharges prior to next session this note will serve as a discharge summary.  Please see below for the latest assessment towards goals.       Patient Diagnosis(es): The primary encounter diagnosis was Diffuse pain. Diagnoses of Rapid atrial fibrillation (HCC) and Hypotension, unspecified hypotension type were also pertinent to this visit.  Past Medical History:  has a past medical history of Acid reflux, Arthritis, Atrial fibrillation (HCC), CAD (coronary artery disease), GERD (gastroesophageal reflux disease), Hepatic steatosis, Hyperlipidemia, Hypertension, NSTEMI (non-ST elevated myocardial infarction) (HCC), Overactive bladder, Peptic ulcer disease, Restless leg syndrome, and TIA (transient ischemic attack).  Past Surgical History:  has a past surgical history that includes Cholecystectomy; lipoma resection; Coronary angioplasty with stent (); Colonoscopy; Dilation and curettage of uterus (N/A, 
Occupational Therapy  Facility/Department: [address] PROGRESSIVE CARE  Occupational Therapy Initial Assessment    Name: Vlad Carrillo  : 1940  MRN: [id number]  Date of Service: 2025    Discharge Recommendations:  2-3 sessions per week, 24 hour supervision or assist  OT Equipment Recommendations  Equipment Needed: No    Vlad Carrillo scored a 19/24 on the AM-PAC ADL Inpatient form. Current research shows that an AM-PAC score of 18 or greater is typically associated with a discharge to the patient's home setting. Based on the patient's AM-PAC score, and their current ADL deficits, it is recommended that the patient have 2-3 sessions per week of Occupational Therapy at d/c to increase the patient's independence. Please see assessment section for further patient specific details.    If patient discharges prior to next session this note will serve as a discharge summary.  Please see below for the latest assessment towards goals.         Patient Diagnosis(es): The primary encounter diagnosis was Diffuse pain. Diagnoses of Rapid atrial fibrillation (HCC) and Hypotension, unspecified hypotension type were also pertinent to this visit.  Past Medical History:  has a past medical history of Acid reflux, Arthritis, Atrial fibrillation (HCC), CAD (coronary artery disease), GERD (gastroesophageal reflux disease), Hepatic steatosis, Hyperlipidemia, Hypertension, NSTEMI (non-ST elevated myocardial infarction) (HCC), Overactive bladder, Peptic ulcer disease, Restless leg syndrome, and TIA (transient ischemic attack).  Past Surgical History:  has a past surgical history that includes Cholecystectomy; lipoma resection; Coronary angioplasty with stent (); Colonoscopy; Dilation and curettage of uterus (N/A, 2019); Carotid endarterectomy (Right, ); Carotid endarterectomy (Left); eye surgery (Bilateral); Upper gastrointestinal endoscopy (N/A, 02/10/2023); Upper gastrointestinal endoscopy (N/A, 
Patient refusing medications this morning until MD sees her at bedside and goes thru each one with her.     -EDIT: after pharmacist, MD, and daughter came to bedside patient rook her meds without complications.   
Physical Therapy  Facility/Department: [address] PROGRESSIVE CARE  Physical Therapy treatment session    Name: Vlad Carrillo  : 1940  MRN: [id number]  Date of Service: 1/3/2025    Discharge Recommendations:  Home with assist PRN, Home with Home health PT, Patient would benefit from continued therapy after discharge   PT Equipment Recommendations  Equipment Needed: No    Vlad Carrillo scored a 19/24 on the AM-PAC short mobility form. Current research shows that an AM-PAC score of 18 or greater is typically associated with a discharge to the patient's home setting. Based on the patient's AM-PAC score and their current functional mobility deficits, it is recommended that the patient have 2-3 sessions per week of Physical Therapy at d/c to increase the patient's independence.  At this time, this patient demonstrates the endurance and safety to discharge home with HHPT and a follow up treatment frequency of 2-3x/wk.  Please see assessment section for further patient specific details.    If patient discharges prior to next session this note will serve as a discharge summary.  Please see below for the latest assessment towards goals.         Patient Diagnosis(es): The primary encounter diagnosis was Diffuse pain. Diagnoses of Rapid atrial fibrillation (HCC) and Hypotension, unspecified hypotension type were also pertinent to this visit.  Past Medical History:  has a past medical history of Acid reflux, Arthritis, Atrial fibrillation (HCC), CAD (coronary artery disease), GERD (gastroesophageal reflux disease), Hepatic steatosis, Hyperlipidemia, Hypertension, NSTEMI (non-ST elevated myocardial infarction) (HCC), Overactive bladder, Peptic ulcer disease, Restless leg syndrome, and TIA (transient ischemic attack).  Past Surgical History:  has a past surgical history that includes Cholecystectomy; lipoma resection; Coronary angioplasty with stent (); Colonoscopy; Dilation and curettage of uterus (N/A, 
Pt to room 5114 from ED via stretcher. Arrived on room air. Pt placed on telemetry monitor, atrial fibrillation rhythm verified with cmu. VSS. Head to toe assessment completed. Pt A&Ox4. Pt oriented to room and how to use call light. Standard safety measures in place, family at bedside. All needs met at this time. Care ongoing.    Electronically signed by Gareth Collins RN on 1/1/2025 at 3:26 PM   
X4  Cognition  Overall Cognitive Status: WFL    Objective     Observation/Palpation  Palpation: Tenderness to touch R medical lower leg, just proxmal and anterior to medial malleolus.  Observation: Mild edema B lower legs.  AROM RLE (degrees)  RLE AROM: WFL  AROM LLE (degrees)  LLE AROM : WFL  LLE General AROM: self limits L knee flexion to ~60 degrees during stand to sit. Mod crepitus in knee during stand to sit.  Strength RLE  Strength RLE: WFL  Strength LLE  Strength LLE: WFL  Bed mobility  Bed Mobility Comments: unable to assess, pt seated in chair at beginning and EOS  Transfers  Sit to Stand: Stand by assistance  Stand to Sit: Stand by assistance  Ambulation  Surface: Level tile  Device: Rolling Walker  Assistance: Contact guard assistance;Stand by assistance  Quality of Gait: Min flexed trunk, step through pattern, stands to rear of RW base (improved somewhat after therapist lowers RW).  Gait Deviations: Decreased step height  Distance: 21' x 2.  Comments: Patient sits on commode, but states does not need to urinate. Lowers and raises brief. Stands at sink, SBA, and washes hands.  Stairs/Curb  Stairs?: No     Balance  Posture: Fair  Comments: Usupported sitting Supervision.  CGA RW gait.    AM-PAC - Mobility    AM-PAC Basic Mobility - Inpatient   How much help is needed turning from your back to your side while in a flat bed without using bedrails?: None  How much help is needed moving from lying on your back to sitting on the side of a flat bed without using bedrails?: A Little  How much help is needed moving to and from a bed to a chair?: A Little  How much help is needed standing up from a chair using your arms?: None  How much help is needed walking in hospital room?: A Little  How much help is needed climbing 3-5 steps with a railing?: A Lot  AM-PAC Inpatient Mobility Raw Score : 19  AM-PAC Inpatient T-Scale Score : 45.44  Mobility Inpatient CMS 0-100% Score: 41.77  Mobility Inpatient CMS G-Code Modifier

## 2025-01-03 NOTE — PLAN OF CARE
Problem: Discharge Planning  Goal: Discharge to home or other facility with appropriate resources  Outcome: Progressing  Flowsheets (Taken 1/2/2025 2015)  Discharge to home or other facility with appropriate resources: Identify barriers to discharge with patient and caregiver     Problem: Pain  Goal: Verbalizes/displays adequate comfort level or baseline comfort level  Outcome: Progressing     Problem: Skin/Tissue Integrity  Goal: Absence of new skin breakdown  Description: 1.  Monitor for areas of redness and/or skin breakdown  2.  Assess vascular access sites hourly  3.  Every 4-6 hours minimum:  Change oxygen saturation probe site  4.  Every 4-6 hours:  If on nasal continuous positive airway pressure, respiratory therapy assess nares and determine need for appliance change or resting period.  Outcome: Progressing     Problem: Safety - Adult  Goal: Free from fall injury  Outcome: Progressing

## 2025-01-04 LAB — HISTONE IGG SER IA-ACNC: 0.9 UNITS (ref 0–0.9)

## 2025-01-17 ENCOUNTER — PROCEDURE VISIT (OUTPATIENT)
Dept: UROGYNECOLOGY | Age: 85
End: 2025-01-17
Payer: MEDICARE

## 2025-01-17 VITALS
HEART RATE: 84 BPM | TEMPERATURE: 97.2 F | DIASTOLIC BLOOD PRESSURE: 75 MMHG | SYSTOLIC BLOOD PRESSURE: 138 MMHG | RESPIRATION RATE: 16 BRPM | OXYGEN SATURATION: 100 %

## 2025-01-17 DIAGNOSIS — N39.41 URGE INCONTINENCE: ICD-10-CM

## 2025-01-17 DIAGNOSIS — N32.81 OAB (OVERACTIVE BLADDER): Primary | ICD-10-CM

## 2025-01-17 LAB
BILIRUBIN, POC: NORMAL
BLOOD URINE, POC: NORMAL
CLARITY, POC: CLEAR
COLOR, POC: YELLOW
GLUCOSE URINE, POC: NORMAL MG/DL
KETONES, POC: NORMAL MG/DL
LEUKOCYTE EST, POC: NORMAL
NITRITE, POC: NORMAL
PH, POC: 5
PROTEIN, POC: NORMAL MG/DL
SPECIFIC GRAVITY, POC: 1.02
UROBILINOGEN, POC: NORMAL MG/DL

## 2025-01-17 PROCEDURE — 81002 URINALYSIS NONAUTO W/O SCOPE: CPT | Performed by: NURSE PRACTITIONER

## 2025-01-17 PROCEDURE — 51784 ANAL/URINARY MUSCLE STUDY: CPT | Performed by: NURSE PRACTITIONER

## 2025-01-17 PROCEDURE — 51741 ELECTRO-UROFLOWMETRY FIRST: CPT | Performed by: NURSE PRACTITIONER

## 2025-01-17 PROCEDURE — 51729 CYSTOMETROGRAM W/VP&UP: CPT | Performed by: NURSE PRACTITIONER

## 2025-01-17 PROCEDURE — 51797 INTRAABDOMINAL PRESSURE TEST: CPT | Performed by: NURSE PRACTITIONER

## 2025-01-17 NOTE — PROGRESS NOTES
Urodynamic Procedure Note    Vlad Palacioasif  1940    Provider supervising testing in office: Jacqueline Armenta NP  Physician reading test: Dr. Palma    Equipment: Laborie    Brief History/Indication:    Patient is a 84 y.o. female with subjective complaints of urge incontinence and OAB for a urodynamic evaluation.      Cystometrogram   RBC, UA   Date Value Ref Range Status   01/02/2025 3 0 - 4 /HPF Final     Nitrite, Urine   Date Value Ref Range Status   01/02/2025 Negative Negative Final     Done by MEGAN on 6/24/24  ECST: Neg  PVR: 120  1st: 20  Urge: 240  Max: 280  Spasm: Neg  FCST-supine: Neg  FCST-standing: Neg       Pelvic Organ Prolapse Quantification  No data recorded No data recorded No data recorded   No data recorded No data recorded No data recorded   No data recorded No data recorded No data recorded   No data recorded        Procedure:  The Patient was taken to the Urodynamics suite and a free urine flow was obtained followed by catheterization for residual urine. A double-lumen urodynamic catheter was introduced to the bladder for measuring bladder pressure, and for filling. EMG patch electrodes were placed perianally for recording activity of the anal sphincter. A vaginal catheter was inserted to record the abdominal pressure. The entire process was displayed and analyzed using the [employer] Urodynamic machine and software.    Findings:   Results for orders placed or performed in visit on 01/17/25   POCT Urinalysis no Micro   Result Value Ref Range    Color, UA yellow     Clarity, UA clear     Glucose, UA POC neg mg/dL    Bilirubin, UA eng     Ketones, UA neg mg/dL    Spec Grav, UA 1.025     Blood, UA POC neg     pH, UA 5     Protein, UA POC neg mg/dL    Urobilinogen, UA neg mg/dL    Leukocytes, UA neg     Nitrite, UA neg        Uroflow:  Patient was able to void for Uroflow    Voided Volume: 154ml  PVR: 125ml  Max Flow: 19ml/sec with an average flow rate of 6.9ml/sec    CMG:  First

## 2025-01-20 ENCOUNTER — TELEPHONE (OUTPATIENT)
Dept: UROGYNECOLOGY | Age: 85
End: 2025-01-20

## 2025-01-20 NOTE — TELEPHONE ENCOUNTER
Patient will be visiting her primary care physcian tomorrow, encouraged patient that if she believes she has any areas on her skin that appear concerning (change in color, size, scabs that wont heal, etc) then she should ask her PCP about them and see if she needs a dermatologist.     Patient verbalizes understanding of all of the above, and has no further questions or concerns at this time. Encouraged to call back the office should any questions/concerns arise. 1/20/2025 at 1:04 PM.

## 2025-01-27 ENCOUNTER — PROCEDURE VISIT (OUTPATIENT)
Dept: UROGYNECOLOGY | Age: 85
End: 2025-01-27
Payer: MEDICARE

## 2025-01-27 VITALS
SYSTOLIC BLOOD PRESSURE: 150 MMHG | RESPIRATION RATE: 16 BRPM | TEMPERATURE: 97.7 F | HEART RATE: 69 BPM | OXYGEN SATURATION: 97 % | DIASTOLIC BLOOD PRESSURE: 77 MMHG

## 2025-01-27 DIAGNOSIS — N32.81 OAB (OVERACTIVE BLADDER): ICD-10-CM

## 2025-01-27 DIAGNOSIS — R39.15 URINARY URGENCY: ICD-10-CM

## 2025-01-27 DIAGNOSIS — R33.9 INCOMPLETE BLADDER EMPTYING: ICD-10-CM

## 2025-01-27 DIAGNOSIS — R35.0 URINARY FREQUENCY: Primary | ICD-10-CM

## 2025-01-27 DIAGNOSIS — N39.41 URGE INCONTINENCE: ICD-10-CM

## 2025-01-27 PROCEDURE — 1123F ACP DISCUSS/DSCN MKR DOCD: CPT | Performed by: OBSTETRICS & GYNECOLOGY

## 2025-01-27 PROCEDURE — 1111F DSCHRG MED/CURRENT MED MERGE: CPT | Performed by: OBSTETRICS & GYNECOLOGY

## 2025-01-27 PROCEDURE — 1036F TOBACCO NON-USER: CPT | Performed by: OBSTETRICS & GYNECOLOGY

## 2025-01-27 PROCEDURE — 1090F PRES/ABSN URINE INCON ASSESS: CPT | Performed by: OBSTETRICS & GYNECOLOGY

## 2025-01-27 PROCEDURE — G8400 PT W/DXA NO RESULTS DOC: HCPCS | Performed by: OBSTETRICS & GYNECOLOGY

## 2025-01-27 PROCEDURE — 0509F URINE INCON PLAN DOCD: CPT | Performed by: OBSTETRICS & GYNECOLOGY

## 2025-01-27 PROCEDURE — G8428 CUR MEDS NOT DOCUMENT: HCPCS | Performed by: OBSTETRICS & GYNECOLOGY

## 2025-01-27 PROCEDURE — 99214 OFFICE O/P EST MOD 30 MIN: CPT | Performed by: OBSTETRICS & GYNECOLOGY

## 2025-01-27 PROCEDURE — 3078F DIAST BP <80 MM HG: CPT | Performed by: OBSTETRICS & GYNECOLOGY

## 2025-01-27 PROCEDURE — 52285 CYSTOSCOPY AND TREATMENT: CPT | Performed by: OBSTETRICS & GYNECOLOGY

## 2025-01-27 PROCEDURE — G8419 CALC BMI OUT NRM PARAM NOF/U: HCPCS | Performed by: OBSTETRICS & GYNECOLOGY

## 2025-01-27 PROCEDURE — 3077F SYST BP >= 140 MM HG: CPT | Performed by: OBSTETRICS & GYNECOLOGY

## 2025-01-27 RX ORDER — WATER 10 ML/10ML
200 INJECTION INTRAMUSCULAR; INTRAVENOUS; SUBCUTANEOUS ONCE
Status: COMPLETED | OUTPATIENT
Start: 2025-01-27 | End: 2025-01-27

## 2025-01-27 RX ORDER — LIDOCAINE HYDROCHLORIDE 20 MG/ML
JELLY TOPICAL ONCE
Status: COMPLETED | OUTPATIENT
Start: 2025-01-27 | End: 2025-01-27

## 2025-01-27 RX ADMIN — LIDOCAINE HYDROCHLORIDE: 20 JELLY TOPICAL at 12:02

## 2025-01-27 RX ADMIN — WATER 200 ML: 10 INJECTION INTRAMUSCULAR; INTRAVENOUS; SUBCUTANEOUS at 12:03

## 2025-01-27 NOTE — PROGRESS NOTES
(3/15/2024)    Received from [facility] and [facility]    Interpersonal Safety     Do you feel physically or emotionally unsafe where you currently live?: No     Within the past 12 months, have you been hit, slapped, kicked or otherwise physically hurt by anyone? : No     Within the past 12 months, have you been hit, slapped, kicked or otherwise physically hurt by anyone? : No   Housing Stability: Low Risk  (1/1/2025)    Housing Stability Vital Sign     Unable to Pay for Housing in the Last Year: No     Number of Times Moved in the Last Year: 1     Homeless in the Last Year: No     Family History:   Family History   Problem Relation Age of Onset    Heart Attack Mother     Early Death Father     Heart Attack Sister     Heart Attack Sister     Heart Attack Brother          Objective:     Vital Signs  Vitals:    01/27/25 1143   BP: (!) 150/77   Pulse: 69   Resp: 16   Temp: 97.7 °F (36.5 °C)   SpO2: 97%      Physical Exam  Physical Exam  HENT:      Head: Normocephalic and atraumatic.   Eyes:      Conjunctiva/sclera: Conjunctivae normal.   Pulmonary:      Effort: Pulmonary effort is normal.   Abdominal:      Palpations: Abdomen is soft.   Genitourinary:     Comments: Uses a walker  Musculoskeletal:      Cervical back: Normal range of motion and neck supple.   Skin:     General: Skin is warm and dry.   Neurological:      Mental Status: She is alert and oriented to person, place, and time.         Procedure:   The urethral was anesthesized with topical lidocaine jelly and dilated up to a #14 [place]. A 0-degree urethroscope was used to examine the urethra. A 70-degree cystoscope was used to evaluate the bladder.    FINDINGS:  1. Urethra was normal  2. Bladder had trabeculations mild  3. Trigone appeared Normal  4. Ureters illustrated bilaterally  5. Patient exhibited spasms during the study no    No results found for this visit on 01/27/25.   Assessment/Plan:     Vlad Carrillo is a 84 y.o. female with

## 2025-05-19 ENCOUNTER — HOSPITAL ENCOUNTER (EMERGENCY)
Age: 85
Discharge: HOME | End: 2025-05-20
Payer: MEDICARE

## 2025-05-19 VITALS
DIASTOLIC BLOOD PRESSURE: 72 MMHG | OXYGEN SATURATION: 95 % | HEART RATE: 78 BPM | RESPIRATION RATE: 16 BRPM | SYSTOLIC BLOOD PRESSURE: 144 MMHG | TEMPERATURE: 98.2 F

## 2025-05-19 DIAGNOSIS — N39.0 URINARY TRACT INFECTION WITH HEMATURIA, SITE UNSPECIFIED: Primary | ICD-10-CM

## 2025-05-19 DIAGNOSIS — R31.9 URINARY TRACT INFECTION WITH HEMATURIA, SITE UNSPECIFIED: Primary | ICD-10-CM

## 2025-05-19 LAB
BACTERIA URNS QL MICRO: ABNORMAL /HPF
BASOPHILS # BLD: 0 K/UL (ref 0–0.2)
BASOPHILS NFR BLD: 0.7 %
BILIRUB UR QL STRIP.AUTO: NEGATIVE
CHARACTER UR: ABNORMAL
CLARITY UR: ABNORMAL
COLOR UR: YELLOW
DEPRECATED RDW RBC AUTO: 15.3 % (ref 12.4–15.4)
EOSINOPHIL # BLD: 0.5 K/UL (ref 0–0.6)
EOSINOPHIL NFR BLD: 7.6 %
EPI CELLS #/AREA URNS AUTO: 6 /HPF (ref 0–5)
GLUCOSE UR STRIP.AUTO-MCNC: NEGATIVE MG/DL
HCT VFR BLD AUTO: 33.9 % (ref 36–48)
HGB BLD-MCNC: 11.3 G/DL (ref 12–16)
HGB UR QL STRIP.AUTO: ABNORMAL
HYALINE CASTS #/AREA URNS AUTO: 2 /LPF (ref 0–8)
KETONES UR STRIP.AUTO-MCNC: NEGATIVE MG/DL
LEUKOCYTE ESTERASE UR QL STRIP.AUTO: ABNORMAL
LYMPHOCYTES # BLD: 1.6 K/UL (ref 1–5.1)
LYMPHOCYTES NFR BLD: 25.8 %
MCH RBC QN AUTO: 29.5 PG (ref 26–34)
MCHC RBC AUTO-ENTMCNC: 33.3 G/DL (ref 31–36)
MCV RBC AUTO: 88.6 FL (ref 80–100)
MONOCYTES # BLD: 0.7 K/UL (ref 0–1.3)
MONOCYTES NFR BLD: 10.7 %
NEUTROPHILS # BLD: 3.4 K/UL (ref 1.7–7.7)
NEUTROPHILS NFR BLD: 55.2 %
NITRITE UR QL STRIP.AUTO: NEGATIVE
PH UR STRIP.AUTO: 5.5 [PH] (ref 5–8)
PLATELET # BLD AUTO: 186 K/UL (ref 135–450)
PMV BLD AUTO: 7.9 FL (ref 5–10.5)
PROT UR STRIP.AUTO-MCNC: NEGATIVE MG/DL
RBC # BLD AUTO: 3.83 M/UL (ref 4–5.2)
RBC CLUMPS #/AREA URNS AUTO: 2 /HPF (ref 0–4)
RENAL EPI CELLS #/AREA UR COMP ASSIST: ABNORMAL /HPF (ref 0–1)
SP GR UR STRIP.AUTO: 1.01 (ref 1–1.03)
UA COMPLETE W REFLEX CULTURE PNL UR: YES
UA DIPSTICK W REFLEX MICRO PNL UR: YES
URN SPEC COLLECT METH UR: ABNORMAL
UROBILINOGEN UR STRIP-ACNC: 1 E.U./DL
WBC # BLD AUTO: 6.2 K/UL (ref 4–11)
WBC #/AREA URNS AUTO: 62 /HPF (ref 0–5)

## 2025-05-19 PROCEDURE — 99283 EMERGENCY DEPT VISIT LOW MDM: CPT

## 2025-05-19 PROCEDURE — 81001 URINALYSIS AUTO W/SCOPE: CPT

## 2025-05-19 PROCEDURE — 85025 COMPLETE CBC W/AUTO DIFF WBC: CPT

## 2025-05-19 PROCEDURE — 87086 URINE CULTURE/COLONY COUNT: CPT

## 2025-05-19 ASSESSMENT — PAIN SCALES - GENERAL: PAINLEVEL_OUTOF10: 0

## 2025-05-20 LAB
ANION GAP SERPL CALCULATED.3IONS-SCNC: 9 MMOL/L (ref 3–16)
BACTERIA UR CULT: NORMAL
BUN SERPL-MCNC: 22 MG/DL (ref 7–20)
CALCIUM SERPL-MCNC: 9.4 MG/DL (ref 8.3–10.6)
CHLORIDE SERPL-SCNC: 101 MMOL/L (ref 99–110)
CO2 SERPL-SCNC: 28 MMOL/L (ref 21–32)
CREAT SERPL-MCNC: 0.6 MG/DL (ref 0.6–1.2)
GFR SERPLBLD CREATININE-BSD FMLA CKD-EPI: 89 ML/MIN/{1.73_M2}
GLUCOSE SERPL-MCNC: 107 MG/DL (ref 70–99)
POTASSIUM SERPL-SCNC: 4.5 MMOL/L (ref 3.5–5.1)
SODIUM SERPL-SCNC: 138 MMOL/L (ref 136–145)

## 2025-05-20 PROCEDURE — 6370000000 HC RX 637 (ALT 250 FOR IP): Performed by: PHYSICIAN ASSISTANT

## 2025-05-20 PROCEDURE — 80048 BASIC METABOLIC PNL TOTAL CA: CPT

## 2025-05-20 RX ORDER — NITROFURANTOIN 25; 75 MG/1; MG/1
100 CAPSULE ORAL
Status: COMPLETED | OUTPATIENT
Start: 2025-05-20 | End: 2025-05-20

## 2025-05-20 RX ORDER — NITROFURANTOIN 25; 75 MG/1; MG/1
100 CAPSULE ORAL 2 TIMES DAILY
Qty: 10 CAPSULE | Refills: 0 | Status: SHIPPED | OUTPATIENT
Start: 2025-05-20 | End: 2025-05-25

## 2025-05-20 RX ADMIN — NITROFURANTOIN (MONOHYDRATE/MACROCRYSTALS) 100 MG: 25; 75 CAPSULE ORAL at 01:24

## 2025-05-20 NOTE — ED PROVIDER NOTES
[facility] EMERGENCY DEPARTMENT  EMERGENCY DEPARTMENT ENCOUNTER        Pt Name: Vlad Carrillo  MRN: [id number]  Birthdate 1940  Date of evaluation: 5/19/2025  Provider: TREVOR Tinoco  PCP: Eliud Leach MD  Note Started: 1:03 AM EDT 5/20/25      KALPANA. I have evaluated this patient.        CHIEF COMPLAINT       Chief Complaint   Patient presents with    Urinary Tract Infection     Pt in from home with worsening mental status \"since Mother's Day\". Daughter reports that pt has been \"hallucinating\" which she \"usually does when she has a UTI\". Pt alert in triage at this time with no signs of distress noted. Pt denies any pain or symptoms.       HISTORY OF PRESENT ILLNESS: 1 or more Elements     History From: jacintoeint, patient daughters  Limitations to history : None    Vlad Carrillo is a 84 y.o. female who presents to the emergency room due to concern for possible urinary tract infection.  Patient's family members state that for the past week the patient has been having more urinary frequency and just recently patient was having some hallucinations stating that she sees her  who passed away a while ago and patient's family states that she will get this way whenever she has a urinary tract infection.  She states this is very typical for her when she has a urinary tract infection which she will have hallucinations and urinary frequency.  She denies any fevers or chills today.  She denies any back pain or flank pain.  She denies any nausea vomiting chest pain shortness of breath or difficulty.  Patient is not experiencing any hallucinations at this time.     Nursing Notes were all reviewed and agreed with or any disagreements were addressed in the HPI.    REVIEW OF SYSTEMS :      Review of Systems   Genitourinary:  Positive for frequency.       Positives and Pertinent negatives as per HPI.     SURGICAL HISTORY     Past Surgical History:   Procedure Laterality Date

## 2025-06-06 ENCOUNTER — APPOINTMENT (OUTPATIENT)
Dept: GENERAL RADIOLOGY | Age: 85
DRG: 312 | End: 2025-06-06
Payer: MEDICARE

## 2025-06-06 ENCOUNTER — APPOINTMENT (OUTPATIENT)
Dept: CT IMAGING | Age: 85
DRG: 312 | End: 2025-06-06
Payer: MEDICARE

## 2025-06-06 ENCOUNTER — APPOINTMENT (OUTPATIENT)
Dept: MRI IMAGING | Age: 85
DRG: 312 | End: 2025-06-06
Payer: MEDICARE

## 2025-06-06 ENCOUNTER — APPOINTMENT (OUTPATIENT)
Age: 85
DRG: 312 | End: 2025-06-06
Attending: INTERNAL MEDICINE
Payer: MEDICARE

## 2025-06-06 ENCOUNTER — HOSPITAL ENCOUNTER (INPATIENT)
Age: 85
LOS: 1 days | Discharge: HOME HEALTH | DRG: 312 | End: 2025-06-07
Attending: EMERGENCY MEDICINE | Admitting: INTERNAL MEDICINE
Payer: MEDICARE

## 2025-06-06 DIAGNOSIS — R42 DIZZINESS: Primary | ICD-10-CM

## 2025-06-06 DIAGNOSIS — R42 VERTIGO: ICD-10-CM

## 2025-06-06 LAB
ALBUMIN SERPL-MCNC: 3.8 G/DL (ref 3.4–5)
ALBUMIN/GLOB SERPL: 1.5 {RATIO} (ref 1.1–2.2)
ALP SERPL-CCNC: 88 U/L (ref 40–129)
ALT SERPL-CCNC: 19 U/L (ref 10–40)
ANION GAP SERPL CALCULATED.3IONS-SCNC: 13 MMOL/L (ref 3–16)
AST SERPL-CCNC: 36 U/L (ref 15–37)
BACTERIA URNS QL MICRO: NORMAL /HPF
BASOPHILS # BLD: 0 K/UL (ref 0–0.2)
BASOPHILS NFR BLD: 0.7 %
BILIRUB SERPL-MCNC: 0.4 MG/DL (ref 0–1)
BILIRUB UR QL STRIP.AUTO: NEGATIVE
BUN SERPL-MCNC: 28 MG/DL (ref 7–20)
CALCIUM SERPL-MCNC: 9.5 MG/DL (ref 8.3–10.6)
CHLORIDE SERPL-SCNC: 103 MMOL/L (ref 99–110)
CLARITY UR: CLEAR
CO2 SERPL-SCNC: 22 MMOL/L (ref 21–32)
COLOR UR: YELLOW
CREAT SERPL-MCNC: 0.5 MG/DL (ref 0.6–1.2)
DEPRECATED RDW RBC AUTO: 14.8 % (ref 12.4–15.4)
ECHO AO ASC DIAM: 3.5 CM
ECHO AO ASCENDING AORTA INDEX: 1.98 CM/M2
ECHO AO ROOT DIAM: 3 CM
ECHO AO ROOT INDEX: 1.69 CM/M2
ECHO AV AREA PEAK VELOCITY: 1.1 CM2
ECHO AV AREA VTI: 1.1 CM2
ECHO AV AREA/BSA PEAK VELOCITY: 0.6 CM2/M2
ECHO AV AREA/BSA VTI: 0.6 CM2/M2
ECHO AV MEAN GRADIENT: 14 MMHG
ECHO AV MEAN VELOCITY: 1.8 M/S
ECHO AV PEAK GRADIENT: 23 MMHG
ECHO AV PEAK VELOCITY: 2.4 M/S
ECHO AV VELOCITY RATIO: 0.33
ECHO AV VTI: 64.1 CM
ECHO BSA: 1.78 M2
ECHO EST RA PRESSURE: 3 MMHG
ECHO IVC EXP: 1.9 CM
ECHO LA AREA 2C: 19.8 CM2
ECHO LA AREA 4C: 21.4 CM2
ECHO LA MAJOR AXIS: 5.3 CM
ECHO LA MINOR AXIS: 5.4 CM
ECHO LA VOL BP: 63 ML (ref 22–52)
ECHO LA VOL MOD A2C: 58 ML (ref 22–52)
ECHO LA VOL MOD A4C: 66 ML (ref 22–52)
ECHO LA VOL/BSA BIPLANE: 36 ML/M2 (ref 16–34)
ECHO LA VOLUME INDEX MOD A2C: 33 ML/M2 (ref 16–34)
ECHO LA VOLUME INDEX MOD A4C: 37 ML/M2 (ref 16–34)
ECHO LV E' LATERAL VELOCITY: 9.34 CM/S
ECHO LV E' SEPTAL VELOCITY: 5.77 CM/S
ECHO LV EF PHYSICIAN: 62 %
ECHO LV FRACTIONAL SHORTENING: 28 % (ref 28–44)
ECHO LV INTERNAL DIMENSION DIASTOLE INDEX: 2.43 CM/M2
ECHO LV INTERNAL DIMENSION DIASTOLIC: 4.3 CM (ref 3.9–5.3)
ECHO LV INTERNAL DIMENSION SYSTOLIC INDEX: 1.75 CM/M2
ECHO LV INTERNAL DIMENSION SYSTOLIC: 3.1 CM
ECHO LV IVSD: 1.5 CM (ref 0.6–0.9)
ECHO LV MASS 2D: 207.8 G (ref 67–162)
ECHO LV MASS INDEX 2D: 117.4 G/M2 (ref 43–95)
ECHO LV POSTERIOR WALL DIASTOLIC: 1.1 CM (ref 0.6–0.9)
ECHO LV RELATIVE WALL THICKNESS RATIO: 0.51
ECHO LVOT AREA: 3.1 CM2
ECHO LVOT AV VTI INDEX: 0.34
ECHO LVOT DIAM: 2 CM
ECHO LVOT MEAN GRADIENT: 2 MMHG
ECHO LVOT PEAK GRADIENT: 3 MMHG
ECHO LVOT PEAK VELOCITY: 0.8 M/S
ECHO LVOT STROKE VOLUME INDEX: 38.5 ML/M2
ECHO LVOT SV: 68.1 ML
ECHO LVOT VTI: 21.7 CM
ECHO MV A VELOCITY: 0.61 M/S
ECHO MV AREA VTI: 2.6 CM2
ECHO MV E DECELERATION TIME (DT): 187 MS
ECHO MV E VELOCITY: 0.86 M/S
ECHO MV E/A RATIO: 1.41
ECHO MV E/E' LATERAL: 9.21
ECHO MV E/E' RATIO (AVERAGED): 12.06
ECHO MV E/E' SEPTAL: 14.9
ECHO MV LVOT VTI INDEX: 1.19
ECHO MV MAX VELOCITY: 1 M/S
ECHO MV MEAN GRADIENT: 1 MMHG
ECHO MV MEAN VELOCITY: 0.4 M/S
ECHO MV PEAK GRADIENT: 4 MMHG
ECHO MV VTI: 25.8 CM
ECHO PV MAX VELOCITY: 0.7 M/S
ECHO PV PEAK GRADIENT: 2 MMHG
ECHO RA AREA 4C: 19.4 CM2
ECHO RA END SYSTOLIC VOLUME APICAL 4 CHAMBER INDEX BSA: 33 ML/M2
ECHO RA VOLUME: 59 ML
ECHO RIGHT VENTRICULAR SYSTOLIC PRESSURE (RVSP): 33 MMHG
ECHO RV BASAL DIMENSION: 4.3 CM
ECHO RV FREE WALL PEAK S': 11.4 CM/S
ECHO RV MID DIMENSION: 3.1 CM
ECHO RV TAPSE: 2 CM (ref 1.7–?)
ECHO TV REGURGITANT MAX VELOCITY: 2.76 M/S
ECHO TV REGURGITANT PEAK GRADIENT: 30 MMHG
EOSINOPHIL # BLD: 0.5 K/UL (ref 0–0.6)
EOSINOPHIL NFR BLD: 10.4 %
EPI CELLS #/AREA URNS AUTO: 0 /HPF (ref 0–5)
GFR SERPLBLD CREATININE-BSD FMLA CKD-EPI: >90 ML/MIN/{1.73_M2}
GLUCOSE BLD-MCNC: 105 MG/DL (ref 70–99)
GLUCOSE SERPL-MCNC: 105 MG/DL (ref 70–99)
GLUCOSE UR STRIP.AUTO-MCNC: NEGATIVE MG/DL
HCT VFR BLD AUTO: 33.7 % (ref 36–48)
HGB BLD-MCNC: 11.4 G/DL (ref 12–16)
HGB UR QL STRIP.AUTO: NEGATIVE
HYALINE CASTS #/AREA URNS AUTO: 0 /LPF (ref 0–8)
INR PPP: 1 (ref 0.85–1.15)
KETONES UR STRIP.AUTO-MCNC: NEGATIVE MG/DL
LEUKOCYTE ESTERASE UR QL STRIP.AUTO: ABNORMAL
LYMPHOCYTES # BLD: 1.4 K/UL (ref 1–5.1)
LYMPHOCYTES NFR BLD: 28.4 %
MCH RBC QN AUTO: 29.7 PG (ref 26–34)
MCHC RBC AUTO-ENTMCNC: 33.9 G/DL (ref 31–36)
MCV RBC AUTO: 87.8 FL (ref 80–100)
MONOCYTES # BLD: 0.6 K/UL (ref 0–1.3)
MONOCYTES NFR BLD: 12 %
NEUTROPHILS # BLD: 2.3 K/UL (ref 1.7–7.7)
NEUTROPHILS NFR BLD: 48.5 %
NITRITE UR QL STRIP.AUTO: NEGATIVE
NT-PROBNP SERPL-MCNC: 414 PG/ML (ref 0–449)
PERFORMED ON: ABNORMAL
PH UR STRIP.AUTO: 5.5 [PH] (ref 5–8)
PLATELET # BLD AUTO: 151 K/UL (ref 135–450)
PMV BLD AUTO: 8.3 FL (ref 5–10.5)
POTASSIUM SERPL-SCNC: 4.3 MMOL/L (ref 3.5–5.1)
PROT SERPL-MCNC: 6.4 G/DL (ref 6.4–8.2)
PROT UR STRIP.AUTO-MCNC: NEGATIVE MG/DL
PROTHROMBIN TIME: 13.4 SEC (ref 11.9–14.9)
RBC # BLD AUTO: 3.84 M/UL (ref 4–5.2)
RBC CLUMPS #/AREA URNS AUTO: 1 /HPF (ref 0–4)
SODIUM SERPL-SCNC: 138 MMOL/L (ref 136–145)
SP GR UR STRIP.AUTO: 1.01 (ref 1–1.03)
TROPONIN, HIGH SENSITIVITY: 20 NG/L (ref 0–14)
TROPONIN, HIGH SENSITIVITY: 20 NG/L (ref 0–14)
UA COMPLETE W REFLEX CULTURE PNL UR: ABNORMAL
UA DIPSTICK W REFLEX MICRO PNL UR: YES
URN SPEC COLLECT METH UR: ABNORMAL
UROBILINOGEN UR STRIP-ACNC: 1 E.U./DL
WBC # BLD AUTO: 4.8 K/UL (ref 4–11)
WBC #/AREA URNS AUTO: 5 /HPF (ref 0–5)

## 2025-06-06 PROCEDURE — 94760 N-INVAS EAR/PLS OXIMETRY 1: CPT

## 2025-06-06 PROCEDURE — 96361 HYDRATE IV INFUSION ADD-ON: CPT

## 2025-06-06 PROCEDURE — 6370000000 HC RX 637 (ALT 250 FOR IP): Performed by: EMERGENCY MEDICINE

## 2025-06-06 PROCEDURE — 93306 TTE W/DOPPLER COMPLETE: CPT

## 2025-06-06 PROCEDURE — 6360000004 HC RX CONTRAST MEDICATION: Performed by: EMERGENCY MEDICINE

## 2025-06-06 PROCEDURE — 85610 PROTHROMBIN TIME: CPT

## 2025-06-06 PROCEDURE — G0378 HOSPITAL OBSERVATION PER HR: HCPCS

## 2025-06-06 PROCEDURE — 97530 THERAPEUTIC ACTIVITIES: CPT

## 2025-06-06 PROCEDURE — 70450 CT HEAD/BRAIN W/O DYE: CPT

## 2025-06-06 PROCEDURE — 36415 COLL VENOUS BLD VENIPUNCTURE: CPT

## 2025-06-06 PROCEDURE — 84484 ASSAY OF TROPONIN QUANT: CPT

## 2025-06-06 PROCEDURE — 99285 EMERGENCY DEPT VISIT HI MDM: CPT

## 2025-06-06 PROCEDURE — 96360 HYDRATION IV INFUSION INIT: CPT

## 2025-06-06 PROCEDURE — 97166 OT EVAL MOD COMPLEX 45 MIN: CPT

## 2025-06-06 PROCEDURE — 81001 URINALYSIS AUTO W/SCOPE: CPT

## 2025-06-06 PROCEDURE — 6370000000 HC RX 637 (ALT 250 FOR IP): Performed by: INTERNAL MEDICINE

## 2025-06-06 PROCEDURE — 71045 X-RAY EXAM CHEST 1 VIEW: CPT

## 2025-06-06 PROCEDURE — 93306 TTE W/DOPPLER COMPLETE: CPT | Performed by: STUDENT IN AN ORGANIZED HEALTH CARE EDUCATION/TRAINING PROGRAM

## 2025-06-06 PROCEDURE — 2580000003 HC RX 258: Performed by: HOSPITALIST

## 2025-06-06 PROCEDURE — 97162 PT EVAL MOD COMPLEX 30 MIN: CPT

## 2025-06-06 PROCEDURE — 93005 ELECTROCARDIOGRAM TRACING: CPT | Performed by: EMERGENCY MEDICINE

## 2025-06-06 PROCEDURE — 85025 COMPLETE CBC W/AUTO DIFF WBC: CPT

## 2025-06-06 PROCEDURE — 2500000003 HC RX 250 WO HCPCS: Performed by: INTERNAL MEDICINE

## 2025-06-06 PROCEDURE — 70498 CT ANGIOGRAPHY NECK: CPT

## 2025-06-06 PROCEDURE — 70551 MRI BRAIN STEM W/O DYE: CPT

## 2025-06-06 PROCEDURE — 80053 COMPREHEN METABOLIC PANEL: CPT

## 2025-06-06 PROCEDURE — 83880 ASSAY OF NATRIURETIC PEPTIDE: CPT

## 2025-06-06 RX ORDER — ASPIRIN 81 MG/1
324 TABLET, CHEWABLE ORAL ONCE
Status: COMPLETED | OUTPATIENT
Start: 2025-06-06 | End: 2025-06-06

## 2025-06-06 RX ORDER — FUROSEMIDE 40 MG/1
40 TABLET ORAL DAILY
Status: DISCONTINUED | OUTPATIENT
Start: 2025-06-06 | End: 2025-06-07 | Stop reason: HOSPADM

## 2025-06-06 RX ORDER — SODIUM CHLORIDE 9 MG/ML
INJECTION, SOLUTION INTRAVENOUS PRN
Status: DISCONTINUED | OUTPATIENT
Start: 2025-06-06 | End: 2025-06-07 | Stop reason: HOSPADM

## 2025-06-06 RX ORDER — POLYETHYLENE GLYCOL 3350 17 G/17G
17 POWDER, FOR SOLUTION ORAL DAILY PRN
Status: DISCONTINUED | OUTPATIENT
Start: 2025-06-06 | End: 2025-06-07 | Stop reason: HOSPADM

## 2025-06-06 RX ORDER — IOPAMIDOL 755 MG/ML
75 INJECTION, SOLUTION INTRAVASCULAR
Status: COMPLETED | OUTPATIENT
Start: 2025-06-06 | End: 2025-06-06

## 2025-06-06 RX ORDER — ATENOLOL 50 MG/1
50 TABLET ORAL DAILY
Status: DISCONTINUED | OUTPATIENT
Start: 2025-06-06 | End: 2025-06-07 | Stop reason: HOSPADM

## 2025-06-06 RX ORDER — LISINOPRIL 10 MG/1
10 TABLET ORAL DAILY
Status: DISCONTINUED | OUTPATIENT
Start: 2025-06-06 | End: 2025-06-07 | Stop reason: HOSPADM

## 2025-06-06 RX ORDER — ONDANSETRON 2 MG/ML
4 INJECTION INTRAMUSCULAR; INTRAVENOUS EVERY 6 HOURS PRN
Status: DISCONTINUED | OUTPATIENT
Start: 2025-06-06 | End: 2025-06-07 | Stop reason: HOSPADM

## 2025-06-06 RX ORDER — ENOXAPARIN SODIUM 100 MG/ML
40 INJECTION SUBCUTANEOUS NIGHTLY
Status: DISCONTINUED | OUTPATIENT
Start: 2025-06-06 | End: 2025-06-07 | Stop reason: HOSPADM

## 2025-06-06 RX ORDER — NITROGLYCERIN 0.4 MG/1
0.4 TABLET SUBLINGUAL EVERY 5 MIN PRN
Status: DISCONTINUED | OUTPATIENT
Start: 2025-06-06 | End: 2025-06-07 | Stop reason: HOSPADM

## 2025-06-06 RX ORDER — ASPIRIN 300 MG/1
300 SUPPOSITORY RECTAL DAILY
Status: DISCONTINUED | OUTPATIENT
Start: 2025-06-07 | End: 2025-06-07 | Stop reason: HOSPADM

## 2025-06-06 RX ORDER — ROPINIROLE 1 MG/1
2 TABLET, FILM COATED ORAL NIGHTLY PRN
Status: DISCONTINUED | OUTPATIENT
Start: 2025-06-06 | End: 2025-06-07 | Stop reason: HOSPADM

## 2025-06-06 RX ORDER — ASPIRIN 81 MG/1
81 TABLET, CHEWABLE ORAL DAILY
Status: DISCONTINUED | OUTPATIENT
Start: 2025-06-07 | End: 2025-06-07 | Stop reason: HOSPADM

## 2025-06-06 RX ORDER — SODIUM CHLORIDE 0.9 % (FLUSH) 0.9 %
5-40 SYRINGE (ML) INJECTION PRN
Status: DISCONTINUED | OUTPATIENT
Start: 2025-06-06 | End: 2025-06-07 | Stop reason: HOSPADM

## 2025-06-06 RX ORDER — POTASSIUM CHLORIDE 1500 MG/1
20 TABLET, EXTENDED RELEASE ORAL 2 TIMES DAILY
Status: DISCONTINUED | OUTPATIENT
Start: 2025-06-06 | End: 2025-06-07 | Stop reason: HOSPADM

## 2025-06-06 RX ORDER — ROPINIROLE 1 MG/1
2 TABLET, FILM COATED ORAL 4 TIMES DAILY
Status: DISCONTINUED | OUTPATIENT
Start: 2025-06-06 | End: 2025-06-07 | Stop reason: HOSPADM

## 2025-06-06 RX ORDER — DULOXETIN HYDROCHLORIDE 20 MG/1
20 CAPSULE, DELAYED RELEASE ORAL EVERY EVENING
Status: DISCONTINUED | OUTPATIENT
Start: 2025-06-06 | End: 2025-06-07 | Stop reason: HOSPADM

## 2025-06-06 RX ORDER — ONDANSETRON 4 MG/1
4 TABLET, ORALLY DISINTEGRATING ORAL EVERY 8 HOURS PRN
Status: DISCONTINUED | OUTPATIENT
Start: 2025-06-06 | End: 2025-06-07 | Stop reason: HOSPADM

## 2025-06-06 RX ORDER — ATORVASTATIN CALCIUM 40 MG/1
40 TABLET, FILM COATED ORAL NIGHTLY
Status: DISCONTINUED | OUTPATIENT
Start: 2025-06-06 | End: 2025-06-06

## 2025-06-06 RX ORDER — PANTOPRAZOLE SODIUM 40 MG/1
40 TABLET, DELAYED RELEASE ORAL NIGHTLY
Status: DISCONTINUED | OUTPATIENT
Start: 2025-06-06 | End: 2025-06-07 | Stop reason: HOSPADM

## 2025-06-06 RX ORDER — CLOPIDOGREL BISULFATE 75 MG/1
75 TABLET ORAL
Status: DISCONTINUED | OUTPATIENT
Start: 2025-06-06 | End: 2025-06-07 | Stop reason: HOSPADM

## 2025-06-06 RX ORDER — FAMOTIDINE 20 MG/1
20 TABLET, FILM COATED ORAL 2 TIMES DAILY
Status: DISCONTINUED | OUTPATIENT
Start: 2025-06-06 | End: 2025-06-07 | Stop reason: HOSPADM

## 2025-06-06 RX ORDER — SODIUM CHLORIDE 0.9 % (FLUSH) 0.9 %
5-40 SYRINGE (ML) INJECTION EVERY 12 HOURS SCHEDULED
Status: DISCONTINUED | OUTPATIENT
Start: 2025-06-06 | End: 2025-06-07 | Stop reason: HOSPADM

## 2025-06-06 RX ORDER — ROSUVASTATIN CALCIUM 20 MG/1
20 TABLET, COATED ORAL NIGHTLY
Status: DISCONTINUED | OUTPATIENT
Start: 2025-06-06 | End: 2025-06-07 | Stop reason: HOSPADM

## 2025-06-06 RX ORDER — MILK THISTLE 500 MG
1 CAPSULE ORAL DAILY
Status: DISCONTINUED | OUTPATIENT
Start: 2025-06-06 | End: 2025-06-06

## 2025-06-06 RX ORDER — SODIUM CHLORIDE 9 MG/ML
INJECTION, SOLUTION INTRAVENOUS CONTINUOUS
Status: ACTIVE | OUTPATIENT
Start: 2025-06-06 | End: 2025-06-06

## 2025-06-06 RX ADMIN — FAMOTIDINE 20 MG: 20 TABLET, FILM COATED ORAL at 10:46

## 2025-06-06 RX ADMIN — FUROSEMIDE 40 MG: 40 TABLET ORAL at 10:46

## 2025-06-06 RX ADMIN — DULOXETINE 20 MG: 20 CAPSULE, DELAYED RELEASE ORAL at 17:30

## 2025-06-06 RX ADMIN — SODIUM CHLORIDE: 0.9 INJECTION, SOLUTION INTRAVENOUS at 12:12

## 2025-06-06 RX ADMIN — SODIUM CHLORIDE, PRESERVATIVE FREE 10 ML: 5 INJECTION INTRAVENOUS at 10:48

## 2025-06-06 RX ADMIN — POTASSIUM CHLORIDE 20 MEQ: 1500 TABLET, EXTENDED RELEASE ORAL at 10:46

## 2025-06-06 RX ADMIN — LISINOPRIL 10 MG: 10 TABLET ORAL at 10:46

## 2025-06-06 RX ADMIN — FAMOTIDINE 20 MG: 20 TABLET, FILM COATED ORAL at 22:03

## 2025-06-06 RX ADMIN — CLOPIDOGREL BISULFATE 75 MG: 75 TABLET, FILM COATED ORAL at 12:06

## 2025-06-06 RX ADMIN — ROPINIROLE HYDROCHLORIDE 2 MG: 1 TABLET, FILM COATED ORAL at 12:06

## 2025-06-06 RX ADMIN — PANTOPRAZOLE SODIUM 40 MG: 40 TABLET, DELAYED RELEASE ORAL at 22:03

## 2025-06-06 RX ADMIN — ROPINIROLE HYDROCHLORIDE 2 MG: 1 TABLET, FILM COATED ORAL at 09:10

## 2025-06-06 RX ADMIN — ROPINIROLE HYDROCHLORIDE 2 MG: 1 TABLET, FILM COATED ORAL at 17:30

## 2025-06-06 RX ADMIN — IOPAMIDOL 75 ML: 755 INJECTION, SOLUTION INTRAVENOUS at 04:05

## 2025-06-06 RX ADMIN — ASPIRIN 324 MG: 81 TABLET, CHEWABLE ORAL at 05:20

## 2025-06-06 RX ADMIN — ATENOLOL 50 MG: 50 TABLET ORAL at 10:46

## 2025-06-06 ASSESSMENT — PAIN - FUNCTIONAL ASSESSMENT: PAIN_FUNCTIONAL_ASSESSMENT: NONE - DENIES PAIN

## 2025-06-06 NOTE — CONSULTS
Neurology Consult Note  Reason for Consult: dizzy spells.     Chief complaint: \"dizzy episodes\"    Ramila Hernandez MD asked me to see Vlad Carrillo in consultation for evaluation of dizzy spells    History of Present Illness:  I obtained my information via the patients daughter, supplemented with chart review.     Vlad Carrillo is a 84 y.o. 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 She is on antiplatelet and statin at baseline.     She was admitted on 6/6/25 for evaluation of dizziness. She is a bit of a difficult subjective historian, just awoke from a nap. Daughter provides most of history. She has been having at least 3 separate episodes of dizziness in the last 5-7 days. On Sunday she became woozy when sitting in a stool/chair and lowered herself to the floor, family had to help her up. She felt better after 1 hour?.   She had another event a day or few later where she was doing a home foot cycling exercise that was like the room was spinning, and self resolved after a few minutes.     The day before she presented she had gotten up in the morning and when getting out of bed she felt unspecified dizzy.     Does not endorse much if any dizziness at time of encounter. Daughter states she is at her baseline.     No known headache, vision changes, focal weakness, numbness or tingling, difficulty swallowing.     She has chronic LUE and LLE pain.     CT head was without acute intracranial findings-remote right parietal infarct. CTA head & neck was without severe stenosis.     Daughter reports therapy thought her blood pressure could drop when standing.     She is on antiplatelet and statin at baseline per conversation.         Medical History:  Past Medical History:   Diagnosis Date    Acid reflux     Arthritis     Atrial fibrillation (HCC)     CAD (coronary artery disease)     GERD (gastroesophageal reflux disease)     Hepatic

## 2025-06-06 NOTE — ACP (ADVANCE CARE PLANNING)
Advance Care Planning   Healthcare Decision Maker:    Primary Decision Maker: Marie Richter - Child - [phone]    Primary Decision Maker: [email or web address] - Child - [phone]      Pt reports she does not have an Adv Directive but would want her daughters to make her health care decisions for her if she could not make them herself.    ZEINAB Cano     Case Management   [phone]    6/6/2025  4:23 PM

## 2025-06-06 NOTE — H&P
Hospital Medicine History & Physical      Date of Admission: 6/6/2025    Date of Service:  Pt seen/examined on 06/06/25     []Admitted to Inpatient with expected LOS greater than two midnights due to medical therapy.  [x]Placed in Observation status.    Chief Admission Complaint:  dizziness    Presenting Admission History:      84 y.o. female with PMHx significant for paroxysmal A-fib, CAD s/p PCI, restless leg syndrome who presented to ED with a complaint of dizzy spells.  Patient states that she started having dizzy spells described as lightheadedness.  Patient had a few episodes in the last 24 hours.  She indicates that the episodes last for about 1 minute and resolve on their own.  Patient denies any particular triggering or relieving factors.  Patient denies any stated nausea vomiting.  No associated focal weakness or numbness.  Patient denies any prior episodes in the past.  She offers no other complaints at this time.  She denies any current dizziness during my evaluation.  In ED CT head was negative for acute changes.  Patient is currently being assigned to observation status for close monitoring and further evaluation.      ROS:     Review of 10 systems is negative except what is outlined in HPI.     Assessment:  Dizziness, rule out central etiology.  Paroxysmal A-fib.  CAD s/p PCI.  Essential hypertension.  Hyperlipidemia.  Restless leg syndrome  GERD    Plan:    Patient is assigned to observation status.  PT/OT evaluation and treatment.  Obtain brain MRI to rule out central etiology for dizziness.  Neurology consultation.  Continue home aspirin and statin.  Add as needed meclizine.  Resume home meds as appropriate.  SQ Lovenox for DVT prophylaxis    Physical Exam Performed:      BP (!) 142/61   Pulse 64   Temp 97.8 °F (36.6 °C) (Oral)   Resp 20   Ht 1.676 m (5' 6\")   Wt 71.5 kg (157 lb 10.1 oz)   SpO2 99%   BMI 25.44 kg/m²     General appearance:  No apparent distress, appears stated age and      Recent Labs     06/06/25 0227 06/06/25 0245   PROBNP 414  --    TROPHS 20* 20*     No results for input(s): \"LABA1C\" in the last 72 hours.  Recent Labs     06/06/25 0227   AST 36   ALT 19   BILITOT 0.4   ALKPHOS 88     Recent Labs     06/06/25 0227   INR 1.00        Antonina Astudillo MD

## 2025-06-06 NOTE — DISCHARGE INSTR - COC
Continuity of Care Form    Patient Name: Vlad Verma   :  1940  MRN:  [id number]    Admit date:  2025  Discharge date:  2025    Code Status Order: Full Code   Advance Directives:     Admitting Physician:  Antonina Astudillo MD  PCP: Eliud Leach MD    Discharging Nurse: AILYN Skinner   Discharging Hospital Unit/Room#: X6K-8948/5252-01  Discharging Unit Phone Number: [phone]    Emergency Contact:   Extended Emergency Contact Information  Primary Emergency Contact: SadejoseMarie   [facility]  Home Phone: [phone]  Relation: Child  Secondary Emergency Contact: perla verma  Home Phone: [phone]  Relation: Child    Past Surgical History:  Past Surgical History:   Procedure Laterality Date    CAROTID ENDARTERECTOMY Right 1995    CAROTID ENDARTERECTOMY Left     CHOLECYSTECTOMY      COLONOSCOPY      CORONARY ANGIOPLASTY WITH STENT PLACEMENT      CORONARY ANGIOPLASTY WITH STENT PLACEMENT  2024    DILATION AND CURETTAGE OF UTERUS N/A 2019    HYSTEROSCOPY DILATION AND CURETTAGE POLYP REMOVAL performed by Zahira Wilkes MD at [facility] OR    EYE SURGERY Bilateral     cataract removal with lens implant    LEFT ATRIAL APPENDAGE OCCLUDER DEVICE      LIPOMA RESECTION      LIPOMA REMOVAL SHOULDER    UPPER GASTROINTESTINAL ENDOSCOPY N/A 02/10/2023    ESOPHAGOGASTRODUODENOSCOPY WITH BALLOON DILATION OF ESOPHAGUS performed by Alex Miranda MD at [facility] ENDOSCOPY    UPPER GASTROINTESTINAL ENDOSCOPY N/A 2023    EGD DILATION BALLOON performed by Alex Miranda MD at [facility] ENDOSCOPY    UPPER GASTROINTESTINAL ENDOSCOPY N/A 2023    ESOPHAGOGASTRODUODENOSCOPY WITH DILATION performed by Alex Miranda MD at [facility] ENDOSCOPY    UPPER GASTROINTESTINAL ENDOSCOPY N/A 2024    ESOPHAGOGASTRODUODENOSCOPY FOREIGN BODY REMOVAL performed by Harjit Blanco DO at [facility] ENDOSCOPY    UPPER GASTROINTESTINAL ENDOSCOPY N/A 2024     at the Time of Hospital Discharge:   Respiratory Treatments: N/A   Oxygen Therapy:  is not on home oxygen therapy.  Ventilator:    - No ventilator support    Rehab Therapies: Physical Therapy and Occupational Therapy  Weight Bearing Status/Restrictions: No weight bearing restrictions  Other Medical Equipment (for information only, NOT a DME order):  walker  Other Treatments: N/A    Patient's personal belongings (please select all that are sent with patient):  None    RN SIGNATURE:  Electronically signed by CHING ARIZA RN on 6/7/25 at 4:25 PM EDT    CASE MANAGEMENT/SOCIAL WORK SECTION    Inpatient Status Date: 06/06/2025    Readmission Risk Assessment Score:  [facility] RISK OF UNPLANNED READMISSION 2.0             0 Total Score      Discharging to Facility/ Agency   Name:  [facility]    Address: [address] [place] [address]., [address] [place] [address]  Phone: [phone]  Fax: [phone]        / signature: Electronically signed by ZEINAB Schaffer on 6/6/25 at 4:22 PM EDT    PHYSICIAN SECTION    Prognosis: Good    Condition at Discharge: Stable    Rehab Potential (if transferring to Rehab): Good    Recommended Labs or Other Treatments After Discharge:     Physician Certification: I certify the above information and transfer of Vlad Carrillo  is necessary for the continuing treatment of the diagnosis listed and that she requires Home Care for greater 30 days.     Update Admission H&P: No change in H&P    PHYSICIAN SIGNATURE:  Electronically signed by SEBLE PABON MD on 6/7/25 at 4:28 PM EDT

## 2025-06-06 NOTE — PROGRESS NOTES
Pt admitted to room 5252 @0940. VSS, pt states 0/10 for pain. Pt passed swallow eval. Scored 0 on NIH assessment. Pt oriented to room with call light in reach. Portable tele placed and confirmed. Pt denies any further needs at this time. Family at bedside.

## 2025-06-06 NOTE — PROGRESS NOTES
Patient seen and examined at bedside    Patient orthostatic vitals are positive, will give IV fluids    PT and OT consult    Follow-up MRI brain    Follow-up neurology recommendation

## 2025-06-06 NOTE — CARE COORDINATION
Chart Reviewed.  Met with patient to introduce  role, initiate discussion regarding DC planning and to complete ACP.  While there is no legal ACP, she wishes for her daughters to be her decision makers for her if she cannot make her own decisions.  This is in line with legal hierarchy.    Case Management Assessment  Initial Evaluation    Date/Time of Evaluation: 6/6/2025 4:29 PM  Assessment Completed by: ZEINAB Schaffer    If patient is discharged prior to next notation, then this note serves as note for discharge by case management.    Patient Name: Vlad Carrillo                   YOB: 1940  Diagnosis: Dizziness [R42]  Vertigo [R42]                   Date / Time: 6/6/2025 12:56 AM    Patient Admission Status: Observation   Readmission Risk (Low < 19, Mod (19-27), High > 27): Readmission Risk Score: 17.1    Current PCP: Eliud Leach MD  PCP verified by CM? Yes (Dr Leach; chart reflects no visit since 4-2024; she reports she saw him more recently)    Chart Reviewed: Yes      History Provided by: Patient  Patient Orientation: Alert and Oriented    Patient Cognition: Alert    Hospitalization in the last 30 days (Readmission):  No    If yes, Readmission Assessment in CM Navigator will be completed.    Advance Directives:      Code Status: Full Code   Patient's Primary Decision Maker is: Legal Next of Kin (She reports her two daughters)    Primary Decision Maker: Marie Richter - Child - [phone]    Primary Decision Maker: nomimercedesasifperla - Child - [phone]    Discharge Planning:      Home Care Information:   Currently active with Home Health Care : Yes - [facility]  Home Care Agency: [facility]               Services: RN/PT/OT          Patient lives with: Alone (lives alone, dgtr lives two doors down the street) Type of Home: House  Primary Care Giver: Self  Patient Support Systems include: Children (two daughters and two nieces, hired private caregiver)   Current  Good insight into deficits, Has needed Durable Medical Equipment at home, Home is wheelchair accessible, Has homemaker services, and daughter lives a few doors down the street    Barriers to discharge:  lives alone    Additional Case Management Notes: Pt lives alone in a house that is two doors from her daughter and her family.  She has support from two daughters and two nieces that assist pt as needed with meal prep two times a week, a privately paid homemaker, medical transportation from family and she is active with [facility] for RN/PT/OT services that she wants to resume.    Therapy is recommending 24 hour care at discharge.  Pt gives me permission to speak with Marie schroeder as her other daughter is busy tonight.     The Plan for Transition of Care is related to the following treatment goals of Dizziness [R42]  Vertigo [R42]    IF APPLICABLE: The Patient and/or patient representative Vlad and her family were provided with a choice of provider and agrees with the discharge plan. Freedom of choice list with basic dialogue that supports the patient's individualized plan of care/goals and shares the quality data associated with the providers was provided to: Patient   Patient Representative Name:       The Patient and/or Patient Representative Agree with the Discharge Plan? Yes    ZEINAB Schaffer  Case Management Department  Ph: [phone] Fax: [phone].      ZEINAB Cano     Case Management   [phone]    6/6/2025  4:34 PM

## 2025-06-06 NOTE — ED PROVIDER NOTES
[facility] EMERGENCY DEPARTMENT  EMERGENCY DEPARTMENT ENCOUNTER      Pt Name: Vlad Carrillo  MRN: [id number]  Birthdate 1940  Date of evaluation: 6/6/2025  Provider: SD LEDEZMA DO    CHIEF COMPLAINT       Chief Complaint   Patient presents with    Dizziness     Pt experienced (2) separate dizzy spells. Each lasting 2 to 3 minutes. One while exercising, several hours later another while sitting in bed. Pt denies any current complaint but was concerned as she lives alone and wanted to be checked out.          HISTORY OF PRESENT ILLNESS   (Location/Symptom, Timing/Onset, Context/Setting, Quality, Duration, Modifying Factors, Severity)  Note limiting factors.   Vlad Carrillo is a 84 y.o. female who presents to the emergency department with a complaint of dizziness.  The patient states that at approximately 1 PM yesterday afternoon on June 5, 2025, Thursday, she was riding an exercise cycle at her house and suddenly felt \"dizzy\".  She describes this as a sensation that her head was spinning.  She stopped cycling for a moment and the symptoms eventually went away after a few minutes.  It happened again just prior to arrival this morning when she was attempting to get up out of bed.  Symptoms lasted for a few minutes and eventually resolved.  Currently she is asymptomatic.    She also reports that on Sunday, 4 days ago she had an episode in which she became \"woozy\" and lowered herself to the floor.  Family members had to come over and help her up.  She reports that she was fine after approximately 1 hour.    She denies any associated headache, vision change, vision loss, facial droop, difficulty speaking, slurred speech.  She denies any focal weakness or numbness in the extremities.    She denies any associated chest pain heaviness pressure tightness shortness of breath diaphoresis or dyspnea on exertion.  She denies any loss of consciousness, syncope or palpitations.    She denies

## 2025-06-06 NOTE — CARE COORDINATION
At pt's approval, I called Marie Goodrich to discuss DC planning.  She confirmed she lives two door down the street from her and her family and she is well cared for by two daughters and two nieces.  She really wants [facility] to be resumed as well and specifically the pt therapist she has been working with.  His name is Mickey.    Dgtr states she thinks she will be discharged to home tomorrow.  Family to pick her up and take her home.  Shared with her the recommendation for initial 24 hour care and she states they can manage that.    She has no concerns at this time for return to home.    ZEINAB Cano     Case Management   [phone]    6/6/2025  4:43 PM

## 2025-06-06 NOTE — CARE COORDINATION
06/06/25 1623   Service Assessment   Patient Orientation Alert and Oriented   Cognition Alert   History Provided By Patient   Primary Caregiver Self   Accompanied By/Relationship no one at bedside   Support Systems Children  (two daughters and two nieces, hired private caregiver)   Patient's Healthcare Decision Maker is: Legal Next of Kin  (She reports her two daughters)   PCP Verified by CM Yes  (Dr Leach; chart reflects no visit since 4-2024; she reports she saw him more recently)   Last Visit to PCP Within last year   Prior Functional Level Assistance with the following:;Shopping;Mobility;Housework;Cooking   Current Functional Level Assistance with the following:;Housework;Cooking;Shopping;Mobility   Can patient return to prior living arrangement Unknown at present   Ability to make needs known: Good   Family able to assist with home care needs: Yes   Would you like for me to discuss the discharge plan with any other family members/significant others, and if so, who? Yes  (her daughters.  Would like for me to call Marie schroeder today as her other daughter is busy this evening)   Financial Resources Medicaid   Community Resources ECF/Home Care;Other (Comment)  (Active with [facility] for RN/PT/OT)   Discharge Planning   Type of Residence House   Living Arrangements Alone  (lives alone, dgtr lives two doors down the street)   Current Services Prior To Admission Durable Medical Equipment   Current DME Prior to Arrival Cane;Wheelchair;Walker;Shower Chair  (Emergency response button, electric wheelchair, bars in shower)   Potential Assistance Needed Home Care  (Active with [facility])   DME Ordered? No   Potential Assistance Purchasing Medications No   Type of Home Care Services OT;PT;Nursing Services   Patient expects to be discharged to: House   One/Two Story Residence One story   History of falls? 0   Services At/After Discharge   Transition of Care Consult (CM Consult) Home Health   Internal Home Health Yes   Services  At/After Discharge Home Health   [place] Resource Information Provided? No   Mode of Transport at Discharge Other (see comment)  (family)   Confirm Follow Up Transport Family   Condition of Participation: Discharge Planning   The Plan for Transition of Care is related to the following treatment goals: Pt is currently active with [facility] and would like to resume.   The Patient and/or Patient Representative was provided with a Choice of Provider? Patient   The Patient and/Or Patient Representative agree with the Discharge Plan? Yes   Freedom of Choice list was provided with basic dialogue that supports the patient's individualized plan of care/goals, treatment preferences, and shares the quality data associated with the providers?  Yes

## 2025-06-06 NOTE — ED TRIAGE NOTES
84 yr old female pt presents from home via EMS. Per pt, she had a dizzy spell lasting approximately 2 to 3 minutes while on her exercise bike. It passed and the evening was normal until mid evening time when she had another spell lasting 2 to 3 minutes while sitting on the bed. Pt states she currently has no dizziness and no complaint however lives home alone and got worried about it and wanted to be checked out for comfort of mind. Pt denies any pain. Pt describes the events as the \"room spinning\".

## 2025-06-06 NOTE — ED NOTES
ED TO INPATIENT SBAR HANDOFF    Patient Name: Vlad Carrillo   Preferred Name: Vlad  : 1940  84 y.o.   Family/Caregiver Present: no   Code Status Order: Prior  PO Status: NPO:No  Telemetry Order: Yes  C-SSRS:    Sitter no     Restraints:     Sepsis Risk Score      Situation  Chief Complaint   Patient presents with    Dizziness     Pt experienced (2) separate dizzy spells. Each lasting 2 to 3 minutes. One while exercising, several hours later another while sitting in bed. Pt denies any current complaint but was concerned as she lives alone and wanted to be checked out.      Brief Description of Patient's Condition: alert and oriented x4  Mental Status: oriented, alert, coherent, logical, thought processes intact, and able to concentrate and follow conversation  Arrived from:Home  Imaging:   CTA HEAD NECK W CONTRAST   Final Result   No acute intracranial abnormality.  Severe chronic small vessel disease.   Small remote infarct in the right parietal lobe.      No evidence of dissection, occlusion, or aneurysm.  Atherosclerotic disease   without hemodynamically significant stenosis.         CT HEAD WO CONTRAST   Final Result   No acute intracranial abnormality.  Severe chronic small vessel disease.   Small remote infarct in the right parietal lobe.      No evidence of dissection, occlusion, or aneurysm.  Atherosclerotic disease   without hemodynamically significant stenosis.         XR CHEST PORTABLE   Final Result   Cardiomegaly with mild vascular congestion.           Abnormal labs:   Abnormal Labs Reviewed   CBC WITH AUTO DIFFERENTIAL - Abnormal; Notable for the following components:       Result Value    RBC 3.84 (*)     Hemoglobin 11.4 (*)     Hematocrit 33.7 (*)     All other components within normal limits   COMPREHENSIVE METABOLIC PANEL W/ REFLEX TO MG FOR LOW K - Abnormal; Notable for the following components:    Glucose 105 (*)     BUN 28 (*)     Creatinine 0.5 (*)     All other components

## 2025-06-06 NOTE — PROGRESS NOTES
[facility] - Occupational Therapy   Phone: [phone]    Occupational Therapy  Facility/Department:[address] PROGRESSIVE CARE    [x] Initial Evaluation            [] Daily Treatment Note         [] Discharge Summary      Patient: Vlad Carrillo   : 1940   MRN: [id number]   Date of Service:  2025    Staff Mobility Recommendation: RW x1     AM-PAC Score:   Discharge Recommendations:     Admitting Diagnosis:  Dizziness  Ordering Physician: Antonina Astudillo MD   Current Admission Summary: \"84 y.o. female with PMHx significant for paroxysmal A-fib, CAD s/p PCI, restless leg syndrome who presented to ED with a complaint of dizzy spells.  Patient states that she started having dizzy spells described as lightheadedness.  Patient had a few episodes in the last 24 hours.  She indicates that the episodes last for about 1 minute and resolve on their own.\"    Past Medical History:  has a past medical history of Acid reflux, Arthritis, Atrial fibrillation (HCC), CAD (coronary artery disease), GERD (gastroesophageal reflux disease), Hepatic steatosis, Hyperlipidemia, Hypertension, NSTEMI (non-ST elevated myocardial infarction) (HCC), Overactive bladder, Peptic ulcer disease, Restless leg syndrome, and TIA (transient ischemic attack).  Past Surgical History:  has a past surgical history that includes Cholecystectomy; lipoma resection; Coronary angioplasty with stent (); Colonoscopy; Dilation and curettage of uterus (N/A, 2019); Carotid endarterectomy (Right, ); Carotid endarterectomy (Left); eye surgery (Bilateral); Upper gastrointestinal endoscopy (N/A, 02/10/2023); Upper gastrointestinal endoscopy (N/A, 2023); Upper gastrointestinal endoscopy (N/A, 2023); Upper gastrointestinal endoscopy (N/A, 2024); Coronary angioplasty with stent (2024); Upper gastrointestinal endoscopy (N/A, 2024); Upper gastrointestinal endoscopy (N/A, 2024); Upper gastrointestinal

## 2025-06-06 NOTE — PROGRESS NOTES
4 Eyes Skin Assessment     NAME:  Vlad Carrillo  YOB: 1940  MEDICAL RECORD NUMBER:  [id number]    The patient is being assessed for  Admission    I agree that at least one RN has performed a thorough Head to Toe Skin Assessment on the patient. ALL assessment sites listed below have been assessed.      Areas assessed by both nurses:    Head, Face, Ears, Shoulders, Back, Chest, Arms, Elbows, Hands, Sacrum. Buttock, Coccyx, Ischium, and Legs. Feet and Heels        Does the Patient have a Wound? No noted wound(s)       Jorge L Prevention initiated by RN: No  Wound Care Orders initiated by RN: No    Pressure Injury (Stage 3,4, Unstageable, DTI, NWPT, and Complex wounds) if present, place Wound referral order by RN under : No    New Ostomies, if present place, Ostomy referral order under : No     Nurse 1 eSignature: Electronically signed by CHING ARIZA RN on 6/6/25 at 4:11 PM EDT    **SHARE this note so that the co-signing nurse can place an eSignature**    Nurse 2 eSignature: Electronically signed by JIMMIE TURK RN on 6/6/25 at 6:33 PM EDT

## 2025-06-07 VITALS
HEART RATE: 68 BPM | WEIGHT: 149.47 LBS | HEIGHT: 66 IN | RESPIRATION RATE: 17 BRPM | DIASTOLIC BLOOD PRESSURE: 58 MMHG | OXYGEN SATURATION: 98 % | TEMPERATURE: 97.2 F | SYSTOLIC BLOOD PRESSURE: 111 MMHG | BODY MASS INDEX: 24.02 KG/M2

## 2025-06-07 PROBLEM — I95.1 ORTHOSTATIC HYPOTENSION: Status: ACTIVE | Noted: 2025-06-07

## 2025-06-07 LAB
ANION GAP SERPL CALCULATED.3IONS-SCNC: 10 MMOL/L (ref 3–16)
BUN SERPL-MCNC: 12 MG/DL (ref 7–20)
CALCIUM SERPL-MCNC: 9.3 MG/DL (ref 8.3–10.6)
CHLORIDE SERPL-SCNC: 102 MMOL/L (ref 99–110)
CHOLEST SERPL-MCNC: 123 MG/DL (ref 0–199)
CO2 SERPL-SCNC: 28 MMOL/L (ref 21–32)
CREAT SERPL-MCNC: 0.5 MG/DL (ref 0.6–1.2)
DEPRECATED RDW RBC AUTO: 14.7 % (ref 12.4–15.4)
EKG ATRIAL RATE: 62 BPM
EKG DIAGNOSIS: NORMAL
EKG P AXIS: 33 DEGREES
EKG P-R INTERVAL: 152 MS
EKG Q-T INTERVAL: 414 MS
EKG QRS DURATION: 88 MS
EKG QTC CALCULATION (BAZETT): 420 MS
EKG R AXIS: 5 DEGREES
EKG T AXIS: 86 DEGREES
EKG VENTRICULAR RATE: 62 BPM
EST. AVERAGE GLUCOSE BLD GHB EST-MCNC: 111.2 MG/DL
GFR SERPLBLD CREATININE-BSD FMLA CKD-EPI: >90 ML/MIN/{1.73_M2}
GLUCOSE SERPL-MCNC: 90 MG/DL (ref 70–99)
HBA1C MFR BLD: 5.5 %
HCT VFR BLD AUTO: 36.1 % (ref 36–48)
HDLC SERPL-MCNC: 64 MG/DL (ref 40–60)
HGB BLD-MCNC: 12.3 G/DL (ref 12–16)
LDLC SERPL CALC-MCNC: 39 MG/DL
MAGNESIUM SERPL-MCNC: 2.07 MG/DL (ref 1.8–2.4)
MCH RBC QN AUTO: 29.3 PG (ref 26–34)
MCHC RBC AUTO-ENTMCNC: 33.9 G/DL (ref 31–36)
MCV RBC AUTO: 86.5 FL (ref 80–100)
PLATELET # BLD AUTO: 163 K/UL (ref 135–450)
PMV BLD AUTO: 8 FL (ref 5–10.5)
POTASSIUM SERPL-SCNC: 3.4 MMOL/L (ref 3.5–5.1)
RBC # BLD AUTO: 4.18 M/UL (ref 4–5.2)
SODIUM SERPL-SCNC: 140 MMOL/L (ref 136–145)
TRIGL SERPL-MCNC: 99 MG/DL (ref 0–150)
VLDLC SERPL CALC-MCNC: 20 MG/DL
WBC # BLD AUTO: 5.3 K/UL (ref 4–11)

## 2025-06-07 PROCEDURE — 94760 N-INVAS EAR/PLS OXIMETRY 1: CPT

## 2025-06-07 PROCEDURE — G0378 HOSPITAL OBSERVATION PER HR: HCPCS

## 2025-06-07 PROCEDURE — 2500000003 HC RX 250 WO HCPCS: Performed by: INTERNAL MEDICINE

## 2025-06-07 PROCEDURE — 6370000000 HC RX 637 (ALT 250 FOR IP): Performed by: INTERNAL MEDICINE

## 2025-06-07 PROCEDURE — 96361 HYDRATE IV INFUSION ADD-ON: CPT

## 2025-06-07 PROCEDURE — 80061 LIPID PANEL: CPT

## 2025-06-07 PROCEDURE — 93010 ELECTROCARDIOGRAM REPORT: CPT | Performed by: STUDENT IN AN ORGANIZED HEALTH CARE EDUCATION/TRAINING PROGRAM

## 2025-06-07 PROCEDURE — 36415 COLL VENOUS BLD VENIPUNCTURE: CPT

## 2025-06-07 PROCEDURE — 80048 BASIC METABOLIC PNL TOTAL CA: CPT

## 2025-06-07 PROCEDURE — 83036 HEMOGLOBIN GLYCOSYLATED A1C: CPT

## 2025-06-07 PROCEDURE — 1200000000 HC SEMI PRIVATE

## 2025-06-07 PROCEDURE — 85027 COMPLETE CBC AUTOMATED: CPT

## 2025-06-07 PROCEDURE — 83735 ASSAY OF MAGNESIUM: CPT

## 2025-06-07 RX ORDER — POTASSIUM CHLORIDE 1500 MG/1
40 TABLET, EXTENDED RELEASE ORAL PRN
Status: DISCONTINUED | OUTPATIENT
Start: 2025-06-07 | End: 2025-06-07 | Stop reason: HOSPADM

## 2025-06-07 RX ORDER — ROSUVASTATIN CALCIUM 20 MG/1
20 TABLET, COATED ORAL NIGHTLY
Qty: 30 TABLET | Refills: 3 | Status: SHIPPED | OUTPATIENT
Start: 2025-06-07

## 2025-06-07 RX ORDER — POTASSIUM CHLORIDE 7.45 MG/ML
10 INJECTION INTRAVENOUS PRN
Status: DISCONTINUED | OUTPATIENT
Start: 2025-06-07 | End: 2025-06-07 | Stop reason: HOSPADM

## 2025-06-07 RX ADMIN — ROPINIROLE HYDROCHLORIDE 2 MG: 1 TABLET, FILM COATED ORAL at 12:37

## 2025-06-07 RX ADMIN — ASPIRIN 81 MG: 81 TABLET, CHEWABLE ORAL at 08:27

## 2025-06-07 RX ADMIN — CLOPIDOGREL BISULFATE 75 MG: 75 TABLET, FILM COATED ORAL at 12:37

## 2025-06-07 RX ADMIN — FUROSEMIDE 40 MG: 40 TABLET ORAL at 08:27

## 2025-06-07 RX ADMIN — SODIUM CHLORIDE, PRESERVATIVE FREE 10 ML: 5 INJECTION INTRAVENOUS at 08:27

## 2025-06-07 RX ADMIN — ROPINIROLE HYDROCHLORIDE 2 MG: 1 TABLET, FILM COATED ORAL at 08:27

## 2025-06-07 RX ADMIN — POTASSIUM CHLORIDE 40 MEQ: 1500 TABLET, EXTENDED RELEASE ORAL at 12:37

## 2025-06-07 RX ADMIN — ATENOLOL 50 MG: 50 TABLET ORAL at 08:27

## 2025-06-07 RX ADMIN — LISINOPRIL 10 MG: 10 TABLET ORAL at 08:27

## 2025-06-07 RX ADMIN — ROPINIROLE HYDROCHLORIDE 2 MG: 1 TABLET, FILM COATED ORAL at 01:03

## 2025-06-07 RX ADMIN — POTASSIUM CHLORIDE 20 MEQ: 1500 TABLET, EXTENDED RELEASE ORAL at 08:27

## 2025-06-07 RX ADMIN — FAMOTIDINE 20 MG: 20 TABLET, FILM COATED ORAL at 08:27

## 2025-06-07 NOTE — PLAN OF CARE
Problem: Discharge Planning  Goal: Discharge to home or other facility with appropriate resources  Outcome: Progressing     Problem: ABCDS Injury Assessment  Goal: Absence of physical injury  Outcome: Progressing     Problem: Safety - Adult  Goal: Free from fall injury  Outcome: Progressing     Problem: Skin/Tissue Integrity  Goal: Skin integrity remains intact  Outcome: Progressing     Problem: Neurosensory - Adult  Goal: Achieves maximal functionality and self care  Outcome: Progressing     Problem: Cardiovascular - Adult  Goal: Absence of cardiac dysrhythmias or at baseline  Outcome: Progressing

## 2025-06-07 NOTE — DISCHARGE SUMMARY
9Hospital Medicine Discharge Summary    Vlad Carrillo  :  1940  MRN:  [id number]    Admit date:  2025  Discharge date: 2025    Admitting Physician:  Eliud Valentin MD  Primary Care Physician:  Eliud Leach MD  Code Status:   Full Code  Status: Inpatient  Discharged Condition: Stable  Activity: Activity as tolerated  Diet:  ADULT DIET; Regular      Discharge Diagnoses:      Dizziness    Orthostatic hypotension    Primary hypertension    Gastric reflux    Hospital Course:   84 y.o. female admitted with dizziness and orthostatic hypotension. MRI brain () chronic microvascular disease without acute intracranial abnormality and remote right parietal lobe infarct also seen on CT head.  Continues on aspirin, clopidogrel, and high intensity statin. No further inpatient neurological work up recommended and Neurology signed off.     Orthostatic hypotension treated with IV fluids.    Dizziness and orthostatic hypotension. MRI brain () chronic microvascular disease without acute intracranial abnormality and remote right parietal lobe infarct also seen on CT head.  Continues on aspirin, clopidogrel, and high intensity statin for prior right parietal stroke.   Primary hypertension on lisinopril 10 mg daily and atenolol 50 mg daily.   Gastric reflux on pantoprazole 40 mg daily and famotidine 20 mg BID.       Vitals:  /68   Pulse 69   Temp 97.8 °F (36.6 °C) (Oral)   Resp 16   Ht 1.676 m (5' 6\")   Wt 67.8 kg (149 lb 7.6 oz)   SpO2 98% on RA  BMI 24.13 kg/m²   General appearance: alert and cooperative with exam  Lungs: clear to auscultation bilaterally  Heart: regular rate and rhythm, S1, S2 normal, no murmur, click, rub or gallop  Abdomen: soft, non-tender; bowel sounds normal; no masses,  no organomegaly  Extremities: extremities normal, atraumatic, no cyanosis or edema  Neurologic: No obvious focal neurologic deficits.      Discharge Medications:   START taking these  vessel disease. Small remote infarct in the right parietal lobe.    Treatments:   medication adjustment, PT/OT    Disposition:   home with home health  Follow up with Eliud Leach MD in 1-2 weeks.      Signed:  ELIUD PABON MD  6/7/2025, 4:12 PM

## 2025-06-07 NOTE — CARE COORDINATION
Case Management Discharge Note          Date / Time of Note: 6/7/2025 4:12 PM                  Patient Name: Vlad Carrillo   YOB: 1940  Diagnosis: Dizziness [R42]  Vertigo [R42]  Orthostatic hypotension [I95.1]   Date / Time: 6/6/2025 12:56 AM    Financial:  Payor: MEDICARE / Plan: MEDICARE PART A AND B / Product Type: *No Product type* /      Pharmacy:    [facility] - [place], [place] - [address] - P [phone] - F [phone]  [address] [facility] [address]  Phone: [phone] Fax: [phone]    [facility] #[id number] - [place], [place] - [address] - P [phone] - F [phone]  [address] [address]  Phone: [phone] Fax: [phone]      Assistance purchasing medications?: Potential Assistance Purchasing Medications: No  Assistance provided by Case Management: None at this time    DISCHARGE Disposition: Home with Home Health Care    Home Care:  Home Care ordered at discharge: Yes  Home Care Agency: [facility]  Phone: [phone]  Fax: [phone]  Orders faxed: Yes    Transportation:  Transportation PLAN for discharge: family   Mode of Transport: Private Car  Time of Transport: TBD    IMM Completed:   Yes, Case management has presented and reviewed IMM letter #2.       IMM Letter date given:: 06/07/25   .   Patient and/or family/POA verbalized understanding of their medicare rights and appeal process if needed. Patient and/or family/POA has signed, initialed and placed the date and time on IMM letter #2 on the the appropriate lines. Copy of letter offered and they are aware that the original copy of IMM letter #2 is available prior to discharge from the paper chart on the unit.  Electronic documentation has been entered into epic for IMM letter #2 and original paper copy has been added to the paper chart at the nurses station.     Additional CM Notes:   DC order noted.  Family will transport home.

## 2025-06-07 NOTE — PROGRESS NOTES
SLP NOTE    SLP eval/tx order received per stroke r/o protocol.  Patient met at bedside. SLP role/evaluation objectives discussed with patient; patient denies increased motor speech, receptive/expressive/cognitive language, and/or swallowing difficulties at this time.  Patient politely decline needs for SLP eval.  Nsg and pt's daughter report no new deficits.  MRI is negative for acute findings.    ST to discontinue evaluation attempts. Please re-consult ST should status change and/or if medical team deems evaluation necessary.    Thank you.    Eduarda Warren, MS CCC/SLP [id number]  Speech Language Pathologist

## 2025-06-07 NOTE — PLAN OF CARE
Problem: Discharge Planning  Goal: Discharge to home or other facility with appropriate resources  6/7/2025 1604 by Susanne Sanchez RN  Outcome: Progressing  6/7/2025 0413 by Roxanna Crump RN  Outcome: Progressing  6/7/2025 0411 by Roxanna Crump RN  Outcome: Progressing     Problem: ABCDS Injury Assessment  Goal: Absence of physical injury  6/7/2025 1604 by Susanne Sanchez RN  Outcome: Progressing  6/7/2025 0413 by Roxanna Crump RN  Outcome: Progressing  6/7/2025 0411 by Roxanna Crump RN  Outcome: Progressing     Problem: Safety - Adult  Goal: Free from fall injury  6/7/2025 1604 by Susanne Sanchez RN  Outcome: Progressing  6/7/2025 0413 by Roxanna Crump RN  Outcome: Progressing  6/7/2025 0411 by Roxanna Crump RN  Outcome: Progressing     Problem: Skin/Tissue Integrity  Goal: Skin integrity remains intact  Description: 1.  Monitor for areas of redness and/or skin breakdown2.  Assess vascular access sites hourly3.  Every 4-6 hours minimum:  Change oxygen saturation probe site4.  Every 4-6 hours:  If on nasal continuous positive airway pressure, respiratory therapy assess nares and determine need for appliance change or resting period  6/7/2025 1604 by Susanne Sanchez RN  Outcome: Progressing  6/7/2025 0413 by Roxanna Crump RN  Outcome: Progressing  6/7/2025 0411 by Roxanna Crump RN  Outcome: Progressing

## 2025-06-07 NOTE — PROGRESS NOTES
Hospitalist Progress Note  6/7/2025 11:33 AM  Subjective:   Admit Date: 6/6/2025  PCP: Eliud Leach MD Status: Observation   Interval History: Hospital Day: 2, admitted with dizziness and orthostatic hypotension. MRI brain (6/6) chronic microvascular disease without acute intracranial abnormality and remote right parietal lobe infarct also seen on CT head.  Continues on aspirin, clopidogrel, and high intensity statin. No further inpatient neurological work up recommended and Neurology signed off.     Diet: regular  Right wrist peripheral IV (6/6, day #2)    Medications:     atenolol  50 mg Oral Daily   clopidogrel  75 mg Oral Lunch   duloxetine  20 mg Oral QPM   famotidine  20 mg Oral BID   furosemide  40 mg Oral Daily   potassium chloride  20 mEq Oral BID   lisinopril  10 mg Oral Daily   pantoprazole  40 mg Oral Nightly   ropinirole  2 mg Oral 4x Daily   enoxaparin  40 mg SubCUTAneous Nightly   rosuvastatin  20 mg Oral Nightly   aspirin  81 mg Oral Daily       Labs:       06/06/25  0227 06/07/25  0813   WBC 4.8 5.3   HGB 11.4* 12.3    163   MCV 87.8 86.5         140   K 4.3 3.4*    102   CO2 22 28   BUN 28* 12   CREATININE 0.5* 0.5*   GLUCOSE 105* 90        MG  --  2.07   CALCIUM 9.5 9.3   ALBUMIN 3.8  --    INR 1.00  --    AST 36  --    ALT 19  --    ALKPHOS 88  --      HgbA1c (6/7) 5.5%  hsTnT (6/6) 20 / 20 ng/mL  proBNP (6/6) 414 pg/mL  INR (6/6) 1.00     Lipid panel (6/7)   Cholesterol, Total 123   Triglycerides 99   HDL 64 H    LDL Cholesterol 39     MRI brain (6/6) Atrophy and chronic microvascular disease without acute intracranial abnormality.  Remote right parietal lobe infarct.    CTA head / neck (6/6) No evidence of dissection, occlusion, or aneurysm.  Atherosclerotic disease without hemodynamically significant stenosis.    Noncontrast CT head (6/6) No acute intracranial abnormality.  Severe chronic small vessel disease. Small remote infarct in the right parietal

## 2025-06-07 NOTE — PROGRESS NOTES
Pt discharged home with home care per MD order. Discharge medications were sent to pt's home pharmacy. Medication changes were discussed with pt and pt's niece. All questions and concerns were answered. Pt denies any further needs at this time.

## 2025-06-18 NOTE — PROGRESS NOTES
Physician Progress Note      PATIENT:               SHILOH LLANOS  CSN #:                  [id number]  :                       1940  ADMIT DATE:       2025 12:56 AM  DISCH DATE:        2025 5:15 PM  RESPONDING  PROVIDER #:        Eliud Valentin MD          QUERY TEXT:    Orhtostatic hypotension is documented in the medical record (Discharge Summary   by Eliud Valentin MD at 2025).  Please clarify the cause such as:    The clinical indicators include:  -\"Patient states that she started having dizzy spells described as   lightheadedness\"...\"Dizziness, rule out central etiology\"... (Internal   Medicine H&P )  -\"Orthostatic hypotension treated with IV fluids\"...\"MRI brain () chronic   microvascular disease without acute intracranial abnormality and remote right   parietal lobe infarct also seen on CT head\"...\"Primary hypertension on   lisinopril 10 mg daily\". (Discharge Summary by Eliud Valentin MD at   2025)    ED Vital Signs: /51, Pulse 69, Resp 18  Options provided:  -- Orthostatic hypotension is related to adverse effect of Lisinopril  -- Orthostatic hypotension is related to neurogenic dysfunction from previous   stroke  -- Orthostatic hypotension is related to volume depletion  -- Other - I will add my own diagnosis  -- Disagree - Not applicable / Not valid  -- Disagree - Clinically unable to determine / Unknown  -- Refer to Clinical Documentation Reviewer    PROVIDER RESPONSE TEXT:    The orthostatic hypotension is related to adverse effect of Lisinopril.    Query created by: Casio, Ulysses on 2025 6:15 PM      Electronically signed by:  Eliud Valentin MD 2025 6:16 PM

## 2025-08-10 ENCOUNTER — ANESTHESIA (OUTPATIENT)
Dept: ENDOSCOPY | Age: 85
End: 2025-08-10
Payer: MEDICARE

## 2025-08-10 ENCOUNTER — APPOINTMENT (OUTPATIENT)
Dept: CT IMAGING | Age: 85
End: 2025-08-10
Payer: MEDICARE

## 2025-08-10 ENCOUNTER — ANESTHESIA EVENT (OUTPATIENT)
Dept: ENDOSCOPY | Age: 85
End: 2025-08-10
Payer: MEDICARE

## 2025-08-10 ENCOUNTER — HOSPITAL ENCOUNTER (EMERGENCY)
Age: 85
Discharge: HOME | End: 2025-08-10
Payer: MEDICARE

## 2025-08-10 VITALS
WEIGHT: 150.13 LBS | HEART RATE: 71 BPM | RESPIRATION RATE: 16 BRPM | BODY MASS INDEX: 24.13 KG/M2 | OXYGEN SATURATION: 100 % | SYSTOLIC BLOOD PRESSURE: 157 MMHG | HEIGHT: 66 IN | DIASTOLIC BLOOD PRESSURE: 75 MMHG | TEMPERATURE: 97.4 F

## 2025-08-10 DIAGNOSIS — R13.10 DYSPHAGIA, UNSPECIFIED TYPE: Primary | ICD-10-CM

## 2025-08-10 LAB
ALBUMIN SERPL-MCNC: 4.2 G/DL (ref 3.4–5)
ALBUMIN/GLOB SERPL: 1.4 {RATIO} (ref 1.1–2.2)
ALP SERPL-CCNC: 122 U/L (ref 40–129)
ALT SERPL-CCNC: 23 U/L (ref 10–40)
ANION GAP SERPL CALCULATED.3IONS-SCNC: 13 MMOL/L (ref 3–16)
AST SERPL-CCNC: 32 U/L (ref 15–37)
BASOPHILS # BLD: 0 K/UL (ref 0–0.2)
BASOPHILS NFR BLD: 0.6 %
BILIRUB SERPL-MCNC: 0.8 MG/DL (ref 0–1)
BUN SERPL-MCNC: 25 MG/DL (ref 7–20)
CALCIUM SERPL-MCNC: 9.6 MG/DL (ref 8.3–10.6)
CHLORIDE SERPL-SCNC: 102 MMOL/L (ref 99–110)
CO2 SERPL-SCNC: 24 MMOL/L (ref 21–32)
CREAT SERPL-MCNC: 0.5 MG/DL (ref 0.6–1.2)
DEPRECATED RDW RBC AUTO: 14.7 % (ref 12.4–15.4)
EOSINOPHIL # BLD: 0.5 K/UL (ref 0–0.6)
EOSINOPHIL NFR BLD: 8.8 %
GFR SERPLBLD CREATININE-BSD FMLA CKD-EPI: >90 ML/MIN/{1.73_M2}
GLUCOSE SERPL-MCNC: 108 MG/DL (ref 70–99)
HCT VFR BLD AUTO: 34.9 % (ref 36–48)
HGB BLD-MCNC: 11.9 G/DL (ref 12–16)
LYMPHOCYTES # BLD: 1.3 K/UL (ref 1–5.1)
LYMPHOCYTES NFR BLD: 22.3 %
MCH RBC QN AUTO: 28.7 PG (ref 26–34)
MCHC RBC AUTO-ENTMCNC: 34.1 G/DL (ref 31–36)
MCV RBC AUTO: 84.2 FL (ref 80–100)
MONOCYTES # BLD: 0.6 K/UL (ref 0–1.3)
MONOCYTES NFR BLD: 11.3 %
NEUTROPHILS # BLD: 3.2 K/UL (ref 1.7–7.7)
NEUTROPHILS NFR BLD: 57 %
PLATELET # BLD AUTO: 164 K/UL (ref 135–450)
PMV BLD AUTO: 8 FL (ref 5–10.5)
POTASSIUM SERPL-SCNC: 3.9 MMOL/L (ref 3.5–5.1)
PROT SERPL-MCNC: 7.2 G/DL (ref 6.4–8.2)
RBC # BLD AUTO: 4.14 M/UL (ref 4–5.2)
SODIUM SERPL-SCNC: 139 MMOL/L (ref 136–145)
WBC # BLD AUTO: 5.7 K/UL (ref 4–11)

## 2025-08-10 PROCEDURE — 7100000011 HC PHASE II RECOVERY - ADDTL 15 MIN: Performed by: INTERNAL MEDICINE

## 2025-08-10 PROCEDURE — 6360000002 HC RX W HCPCS

## 2025-08-10 PROCEDURE — 80053 COMPREHEN METABOLIC PANEL: CPT

## 2025-08-10 PROCEDURE — 3609017100 HC EGD: Performed by: INTERNAL MEDICINE

## 2025-08-10 PROCEDURE — 3700000001 HC ADD 15 MINUTES (ANESTHESIA): Performed by: INTERNAL MEDICINE

## 2025-08-10 PROCEDURE — 6360000002 HC RX W HCPCS: Performed by: ANESTHESIOLOGY

## 2025-08-10 PROCEDURE — 2709999900 HC NON-CHARGEABLE SUPPLY: Performed by: INTERNAL MEDICINE

## 2025-08-10 PROCEDURE — 96374 THER/PROPH/DIAG INJ IV PUSH: CPT

## 2025-08-10 PROCEDURE — 7100000010 HC PHASE II RECOVERY - FIRST 15 MIN: Performed by: INTERNAL MEDICINE

## 2025-08-10 PROCEDURE — 99285 EMERGENCY DEPT VISIT HI MDM: CPT

## 2025-08-10 PROCEDURE — 2500000003 HC RX 250 WO HCPCS: Performed by: ANESTHESIOLOGY

## 2025-08-10 PROCEDURE — 2580000003 HC RX 258

## 2025-08-10 PROCEDURE — 70491 CT SOFT TISSUE NECK W/DYE: CPT

## 2025-08-10 PROCEDURE — 3700000000 HC ANESTHESIA ATTENDED CARE: Performed by: INTERNAL MEDICINE

## 2025-08-10 PROCEDURE — 2580000003 HC RX 258: Performed by: ANESTHESIOLOGY

## 2025-08-10 PROCEDURE — 7100000001 HC PACU RECOVERY - ADDTL 15 MIN: Performed by: INTERNAL MEDICINE

## 2025-08-10 PROCEDURE — 85025 COMPLETE CBC W/AUTO DIFF WBC: CPT

## 2025-08-10 PROCEDURE — 7100000000 HC PACU RECOVERY - FIRST 15 MIN: Performed by: INTERNAL MEDICINE

## 2025-08-10 PROCEDURE — 6360000004 HC RX CONTRAST MEDICATION

## 2025-08-10 RX ORDER — GLUCAGON 1 MG/ML
1 KIT INJECTION ONCE
Status: COMPLETED | OUTPATIENT
Start: 2025-08-10 | End: 2025-08-10

## 2025-08-10 RX ORDER — LIDOCAINE HYDROCHLORIDE 20 MG/ML
INJECTION, SOLUTION EPIDURAL; INFILTRATION; INTRACAUDAL; PERINEURAL
Status: DISCONTINUED | OUTPATIENT
Start: 2025-08-10 | End: 2025-08-10 | Stop reason: SDUPTHER

## 2025-08-10 RX ORDER — 0.9 % SODIUM CHLORIDE 0.9 %
500 INTRAVENOUS SOLUTION INTRAVENOUS ONCE
Status: COMPLETED | OUTPATIENT
Start: 2025-08-10 | End: 2025-08-10

## 2025-08-10 RX ORDER — SODIUM CHLORIDE 9 MG/ML
INJECTION, SOLUTION INTRAVENOUS
Status: DISCONTINUED | OUTPATIENT
Start: 2025-08-10 | End: 2025-08-10 | Stop reason: SDUPTHER

## 2025-08-10 RX ORDER — ETOMIDATE 2 MG/ML
INJECTION INTRAVENOUS
Status: DISCONTINUED | OUTPATIENT
Start: 2025-08-10 | End: 2025-08-10 | Stop reason: SDUPTHER

## 2025-08-10 RX ORDER — METOPROLOL TARTRATE 1 MG/ML
INJECTION, SOLUTION INTRAVENOUS
Status: DISCONTINUED | OUTPATIENT
Start: 2025-08-10 | End: 2025-08-10 | Stop reason: SDUPTHER

## 2025-08-10 RX ORDER — IOPAMIDOL 755 MG/ML
75 INJECTION, SOLUTION INTRAVASCULAR
Status: COMPLETED | OUTPATIENT
Start: 2025-08-10 | End: 2025-08-10

## 2025-08-10 RX ORDER — PANTOPRAZOLE SODIUM 40 MG/1
40 TABLET, DELAYED RELEASE ORAL 2 TIMES DAILY
Qty: 60 TABLET | Refills: 5
Start: 2025-08-10

## 2025-08-10 RX ORDER — SUCCINYLCHOLINE/SOD CL,ISO/PF 200MG/10ML
SYRINGE (ML) INTRAVENOUS
Status: DISCONTINUED | OUTPATIENT
Start: 2025-08-10 | End: 2025-08-10 | Stop reason: SDUPTHER

## 2025-08-10 RX ADMIN — LIDOCAINE HYDROCHLORIDE 70 MG: 20 INJECTION, SOLUTION EPIDURAL; INFILTRATION; INTRACAUDAL; PERINEURAL at 15:45

## 2025-08-10 RX ADMIN — METOPROLOL TARTRATE 2 MG: 5 INJECTION INTRAVENOUS at 15:54

## 2025-08-10 RX ADMIN — Medication 100 MG: at 15:45

## 2025-08-10 RX ADMIN — GLUCAGON 1 MG: KIT at 11:49

## 2025-08-10 RX ADMIN — SODIUM CHLORIDE 500 ML: 9 INJECTION, SOLUTION INTRAVENOUS at 14:44

## 2025-08-10 RX ADMIN — METOPROLOL TARTRATE 2 MG: 5 INJECTION INTRAVENOUS at 15:52

## 2025-08-10 RX ADMIN — ETOMIDATE 18 MG: 2 INJECTION, SOLUTION INTRAVENOUS at 15:45

## 2025-08-10 RX ADMIN — SODIUM CHLORIDE: 9 INJECTION, SOLUTION INTRAVENOUS at 15:39

## 2025-08-10 RX ADMIN — IOPAMIDOL 75 ML: 755 INJECTION, SOLUTION INTRAVENOUS at 12:39

## 2025-08-10 ASSESSMENT — PAIN - FUNCTIONAL ASSESSMENT
PAIN_FUNCTIONAL_ASSESSMENT: 0-10
PAIN_FUNCTIONAL_ASSESSMENT: ADULT NONVERBAL PAIN SCALE (NPVS)
PAIN_FUNCTIONAL_ASSESSMENT: ACTIVITIES ARE NOT PREVENTED
PAIN_FUNCTIONAL_ASSESSMENT: 0-10

## 2025-08-10 ASSESSMENT — PAIN DESCRIPTION - PAIN TYPE: TYPE: ACUTE PAIN

## 2025-08-10 ASSESSMENT — PAIN DESCRIPTION - DESCRIPTORS
DESCRIPTORS: TIGHTNESS
DESCRIPTORS: SORE

## 2025-08-10 ASSESSMENT — PAIN SCALES - GENERAL
PAINLEVEL_OUTOF10: 0
PAINLEVEL_OUTOF10: 8

## 2025-08-10 ASSESSMENT — PAIN DESCRIPTION - LOCATION: LOCATION: THROAT

## (undated) DEVICE — SYRINGE INFL 60ML DISP ALLIANCE II

## (undated) DEVICE — CONMED SCOPE SAVER BITE BLOCK, 20X27 MM: Brand: SCOPE SAVER

## (undated) DEVICE — SOLUTION PREP POVIDONE IOD FOR SKIN MUCOUS MEM PRIOR TO

## (undated) DEVICE — BITE BLOCK ENDOSCP AD 60 FR W/ ADJ STRP PLAS GRN BLOX

## (undated) DEVICE — ENDOSCOPY KIT: Brand: MEDLINE INDUSTRIES, INC.

## (undated) DEVICE — ESOPHAGEAL/PYLORIC/COLONIC/BILIARY WIREGUIDED BALLOON DILATATION CATHETER: Brand: CRE™ PRO

## (undated) DEVICE — PAD,NON-ADHERENT,3X8,STERILE,LF,1/PK: Brand: MEDLINE

## (undated) DEVICE — Z DISCONTINUED USE 2305833 HANDPIECE ELECSURG THERM ABLATN DISP

## (undated) DEVICE — FORMALIN CLEAR VIAL 20 ML 10%

## (undated) DEVICE — DRAPE LAP 104X35X124IN BLU CTRL + FAB REINF ABD FEN

## (undated) DEVICE — SNARE ENDOSCP L240CM SHTH DIA24MM LOOP W10MM POLYP RND REINF

## (undated) DEVICE — SOLUTION IV 1000ML 0.9% SOD CHL PH 5 INJ USP VIAFLX PLAS

## (undated) DEVICE — Y-TYPE TUR/BLADDER IRRIGATION SET, REGULATING CLAMP

## (undated) DEVICE — DRAPE,UNDERBUTTOCKS,PCH,STERILE: Brand: MEDLINE

## (undated) DEVICE — KIT OR ROOM TURNOVER W/STRAP

## (undated) DEVICE — Z DISCONTINUED USE 2270995 CATHETER URETH 16FR L16IN RED RUB INTMIT RND HLLW TIP 2 OPP

## (undated) DEVICE — Device

## (undated) DEVICE — RETRIEVAL DEVICE: Brand: RESCUENET™

## (undated) DEVICE — FORCEPS BX 240CM 2.4MM L NDL RAD JAW 4 M00513334

## (undated) DEVICE — MINOR SET UP PK

## (undated) DEVICE — ELECTRODE ECG MONITR FOAM TEAR DROP ADLT RED

## (undated) DEVICE — TUBING, SUCTION, 1/4" X 10', STRAIGHT: Brand: MEDLINE

## (undated) DEVICE — ENDO CARRY-ON PROCEDURE KIT INCLUDES SUCTION TUBING, LUBRICANT, GAUZE, BIOHAZARD STICKER, TRANSPORT PAD AND INTERCEPT BEDSIDE KIT.: Brand: ENDO CARRY-ON PROCEDURE KIT

## (undated) DEVICE — Z DISCONTINUED BY MEDLINE USE 2711682 TRAY SKIN PREP DRY W/ PREM GLV

## (undated) DEVICE — PACK,LAPARASCOPY,PELVISCOPY,AURORA: Brand: MEDLINE

## (undated) DEVICE — COVER LT HNDL BLU PLAS

## (undated) DEVICE — SOLUTION SCRB 4OZ 10% POVIDONE IOD ANTIMIC BTL

## (undated) DEVICE — SOLUTION IV IRRIG POUR BRL 0.9% SODIUM CHL 2F7124